# Patient Record
Sex: FEMALE | NOT HISPANIC OR LATINO | Employment: OTHER | URBAN - METROPOLITAN AREA
[De-identification: names, ages, dates, MRNs, and addresses within clinical notes are randomized per-mention and may not be internally consistent; named-entity substitution may affect disease eponyms.]

---

## 2017-01-03 ENCOUNTER — THERAPY VISIT (OUTPATIENT)
Dept: PHYSICAL THERAPY | Facility: CLINIC | Age: 57
End: 2017-01-03
Payer: COMMERCIAL

## 2017-01-03 DIAGNOSIS — S42.254A CLOSED NONDISPLACED FRACTURE OF GREATER TUBEROSITY OF RIGHT HUMERUS, INITIAL ENCOUNTER: ICD-10-CM

## 2017-01-03 DIAGNOSIS — M25.511 ACUTE PAIN OF RIGHT SHOULDER: Primary | ICD-10-CM

## 2017-01-03 PROCEDURE — 97110 THERAPEUTIC EXERCISES: CPT | Mod: GP

## 2017-01-03 PROCEDURE — 97112 NEUROMUSCULAR REEDUCATION: CPT | Mod: GP

## 2017-01-09 ENCOUNTER — THERAPY VISIT (OUTPATIENT)
Dept: PHYSICAL THERAPY | Facility: CLINIC | Age: 57
End: 2017-01-09
Payer: COMMERCIAL

## 2017-01-09 DIAGNOSIS — M25.511 ACUTE PAIN OF RIGHT SHOULDER: Primary | ICD-10-CM

## 2017-01-09 DIAGNOSIS — S42.254A CLOSED NONDISPLACED FRACTURE OF GREATER TUBEROSITY OF RIGHT HUMERUS, INITIAL ENCOUNTER: ICD-10-CM

## 2017-01-09 PROCEDURE — 97110 THERAPEUTIC EXERCISES: CPT | Mod: GP

## 2017-01-09 PROCEDURE — 97112 NEUROMUSCULAR REEDUCATION: CPT | Mod: GP

## 2017-01-09 NOTE — PROGRESS NOTES
Subjective:    HPI                    Objective:    System                   Shoulder Evaluation:  ROM:  AROM:  : ROM below is AAROM.  Flexion:  Right:  158    Abduction:  Right:  130    Internal Rotation:  Right:  30 (@90)  External Rotation:  Right:  88 (@90)                      Strength:    Flexion: Right: 5-/5     Pain:     Abduction:  Right: 4+/5     Pain:    Internal Rotation:  Right: 5-/5     Pain:  External Rotation:   Right:4+/5     Pain:                    Mobility Tests:  Mobility wnl shoulder: Movement pattern suggest substitution pattern with shoulder elevation but RTC strength and testing does not suggest tear;                                                    General     ROS    Assessment/Plan:      PROGRESS  REPORT          SUBJECTIVE  Subjective changes noted by patient:  R shoulder .       Changes in function:  Yes (See Goal flowsheet attached for changes in current functional level)  Adverse reaction to treatment or activity: None    OBJECTIVE  Changes noted in objective findings:  The objective findings below are from DOS 1/9/17.        ASSESSMENT/PLAN  Updated problem list and treatment plan: Diagnosis 1:  S/P greater tuberosity fracture due to GH dislocation   Decreased ROM/flexibility - manual therapy and therapeutic exercise  Decreased strength - therapeutic exercise and therapeutic activities  Impaired muscle performance - neuro re-education  Decreased function - therapeutic activities  STG/LTGs have been met or progress has been made towards goals:  Yes (See Goal flow sheet completed today.)  Assessment of Progress: The patient's condition has potential to improve.  The patient's progress has slowed.  Self Management Plans:  Patient has been instructed in a home treatment program.  Patient is independent in a home treatment program.  I have re-evaluated this patient and find that the nature, scope, duration and intensity of the therapy is appropriate for the medical condition of the  patient.  Promise continues to require the following intervention to meet STG and LTG's:  PT    Recommendations:  This patient would benefit from continued therapy.     Frequency:  2-4 X per month, once daily  Duration:  for 3 months        Please refer to the daily flowsheet for treatment today, total treatment time and time spent performing 1:1 timed codes.

## 2017-01-25 ENCOUNTER — THERAPY VISIT (OUTPATIENT)
Dept: PHYSICAL THERAPY | Facility: CLINIC | Age: 57
End: 2017-01-25
Payer: COMMERCIAL

## 2017-01-25 DIAGNOSIS — M25.511 ACUTE PAIN OF RIGHT SHOULDER: Primary | ICD-10-CM

## 2017-01-25 DIAGNOSIS — S42.254A CLOSED NONDISPLACED FRACTURE OF GREATER TUBEROSITY OF RIGHT HUMERUS, INITIAL ENCOUNTER: ICD-10-CM

## 2017-01-25 PROCEDURE — 97110 THERAPEUTIC EXERCISES: CPT | Mod: GP

## 2017-01-25 PROCEDURE — 97112 NEUROMUSCULAR REEDUCATION: CPT | Mod: GP

## 2017-02-03 ENCOUNTER — THERAPY VISIT (OUTPATIENT)
Dept: PHYSICAL THERAPY | Facility: CLINIC | Age: 57
End: 2017-02-03
Payer: COMMERCIAL

## 2017-02-03 DIAGNOSIS — S42.254A CLOSED NONDISPLACED FRACTURE OF GREATER TUBEROSITY OF RIGHT HUMERUS, INITIAL ENCOUNTER: ICD-10-CM

## 2017-02-03 DIAGNOSIS — M25.511 ACUTE PAIN OF RIGHT SHOULDER: Primary | ICD-10-CM

## 2017-02-03 PROCEDURE — 97112 NEUROMUSCULAR REEDUCATION: CPT | Mod: GP

## 2017-02-03 PROCEDURE — 97110 THERAPEUTIC EXERCISES: CPT | Mod: GP

## 2017-02-13 ENCOUNTER — THERAPY VISIT (OUTPATIENT)
Dept: PHYSICAL THERAPY | Facility: CLINIC | Age: 57
End: 2017-02-13
Payer: COMMERCIAL

## 2017-02-13 DIAGNOSIS — S42.254A CLOSED NONDISPLACED FRACTURE OF GREATER TUBEROSITY OF RIGHT HUMERUS, INITIAL ENCOUNTER: ICD-10-CM

## 2017-02-13 DIAGNOSIS — M25.511 ACUTE PAIN OF RIGHT SHOULDER: ICD-10-CM

## 2017-02-13 PROCEDURE — 97110 THERAPEUTIC EXERCISES: CPT | Mod: GP

## 2017-02-13 PROCEDURE — 97112 NEUROMUSCULAR REEDUCATION: CPT | Mod: GP

## 2017-02-22 ENCOUNTER — THERAPY VISIT (OUTPATIENT)
Dept: PHYSICAL THERAPY | Facility: CLINIC | Age: 57
End: 2017-02-22
Payer: COMMERCIAL

## 2017-02-22 DIAGNOSIS — M25.511 ACUTE PAIN OF RIGHT SHOULDER: ICD-10-CM

## 2017-02-22 DIAGNOSIS — S42.254A CLOSED NONDISPLACED FRACTURE OF GREATER TUBEROSITY OF RIGHT HUMERUS, INITIAL ENCOUNTER: ICD-10-CM

## 2017-02-22 PROCEDURE — 97112 NEUROMUSCULAR REEDUCATION: CPT | Mod: GP

## 2017-02-22 PROCEDURE — 97010 HOT OR COLD PACKS THERAPY: CPT | Mod: GP

## 2017-02-22 PROCEDURE — 97110 THERAPEUTIC EXERCISES: CPT | Mod: GP

## 2017-03-07 ENCOUNTER — THERAPY VISIT (OUTPATIENT)
Dept: PHYSICAL THERAPY | Facility: CLINIC | Age: 57
End: 2017-03-07
Payer: COMMERCIAL

## 2017-03-07 DIAGNOSIS — S42.254A CLOSED NONDISPLACED FRACTURE OF GREATER TUBEROSITY OF RIGHT HUMERUS, INITIAL ENCOUNTER: ICD-10-CM

## 2017-03-07 DIAGNOSIS — M25.511 ACUTE PAIN OF RIGHT SHOULDER: ICD-10-CM

## 2017-03-07 PROCEDURE — 97110 THERAPEUTIC EXERCISES: CPT | Mod: GP

## 2017-03-07 PROCEDURE — 97112 NEUROMUSCULAR REEDUCATION: CPT | Mod: GP

## 2017-03-22 ENCOUNTER — THERAPY VISIT (OUTPATIENT)
Dept: PHYSICAL THERAPY | Facility: CLINIC | Age: 57
End: 2017-03-22
Payer: COMMERCIAL

## 2017-03-22 DIAGNOSIS — S42.254A CLOSED NONDISPLACED FRACTURE OF GREATER TUBEROSITY OF RIGHT HUMERUS, INITIAL ENCOUNTER: ICD-10-CM

## 2017-03-22 DIAGNOSIS — M25.511 ACUTE PAIN OF RIGHT SHOULDER: ICD-10-CM

## 2017-03-22 PROCEDURE — 97112 NEUROMUSCULAR REEDUCATION: CPT | Mod: GP

## 2017-03-22 PROCEDURE — 97110 THERAPEUTIC EXERCISES: CPT | Mod: GP

## 2017-04-12 ENCOUNTER — THERAPY VISIT (OUTPATIENT)
Dept: PHYSICAL THERAPY | Facility: CLINIC | Age: 57
End: 2017-04-12
Payer: COMMERCIAL

## 2017-04-12 DIAGNOSIS — M25.511 ACUTE PAIN OF RIGHT SHOULDER: ICD-10-CM

## 2017-04-12 DIAGNOSIS — S42.254A CLOSED NONDISPLACED FRACTURE OF GREATER TUBEROSITY OF RIGHT HUMERUS, INITIAL ENCOUNTER: ICD-10-CM

## 2017-04-12 PROCEDURE — 97112 NEUROMUSCULAR REEDUCATION: CPT | Mod: GP

## 2017-04-12 PROCEDURE — 97010 HOT OR COLD PACKS THERAPY: CPT | Mod: GP

## 2017-04-12 PROCEDURE — 97110 THERAPEUTIC EXERCISES: CPT | Mod: GP

## 2017-05-03 ENCOUNTER — THERAPY VISIT (OUTPATIENT)
Dept: PHYSICAL THERAPY | Facility: CLINIC | Age: 57
End: 2017-05-03
Payer: COMMERCIAL

## 2017-05-03 DIAGNOSIS — M25.511 ACUTE PAIN OF RIGHT SHOULDER: ICD-10-CM

## 2017-05-03 DIAGNOSIS — S42.254A CLOSED NONDISPLACED FRACTURE OF GREATER TUBEROSITY OF RIGHT HUMERUS, INITIAL ENCOUNTER: ICD-10-CM

## 2017-05-03 PROCEDURE — 97112 NEUROMUSCULAR REEDUCATION: CPT | Mod: GP

## 2017-05-03 PROCEDURE — 97110 THERAPEUTIC EXERCISES: CPT | Mod: GP

## 2017-05-31 ENCOUNTER — THERAPY VISIT (OUTPATIENT)
Dept: PHYSICAL THERAPY | Facility: CLINIC | Age: 57
End: 2017-05-31
Payer: COMMERCIAL

## 2017-05-31 DIAGNOSIS — S42.254A CLOSED NONDISPLACED FRACTURE OF GREATER TUBEROSITY OF RIGHT HUMERUS, INITIAL ENCOUNTER: ICD-10-CM

## 2017-05-31 DIAGNOSIS — M25.511 ACUTE PAIN OF RIGHT SHOULDER: ICD-10-CM

## 2017-05-31 PROCEDURE — 97112 NEUROMUSCULAR REEDUCATION: CPT | Mod: GP

## 2017-05-31 PROCEDURE — 97110 THERAPEUTIC EXERCISES: CPT | Mod: GP

## 2017-05-31 NOTE — MR AVS SNAPSHOT
After Visit Summary   5/31/2017    Promise Nunez    MRN: [id number]           Patient Information     Date Of Birth          1960        Visit Information        Provider Department      5/31/2017 12:50 PM Femi Ferrara PT [place] [facility]        Today's Diagnoses     Acute pain of right shoulder        Closed nondisplaced fracture of greater tuberosity of right humerus, initial encounter           Follow-ups after your visit        Who to contact     If you have questions or need follow up information about today's clinic visit or your schedule please contact [facility] directly at [phone].  Normal or non-critical lab and imaging results will be communicated to you by [employer], letter or phone within 4 business days after the clinic has received the results. If you do not hear from us within 7 days, please contact the clinic through [employer] or phone. If you have a critical or abnormal lab result, we will notify you by phone as soon as possible.  Submit refill requests through [employer] or call your pharmacy and they will forward the refill request to us. Please allow 3 business days for your refill to be completed.          Additional Information About Your Visit        MyChart Information     [employer] gives you secure access to your electronic health record. If you see a primary care provider, you can also send messages to your care team and make appointments. If you have questions, please call your primary care clinic.  If you do not have a primary care provider, please call [phone] and they will assist you.        Care EveryWhere ID     This is your Care EveryWhere ID. This could be used by other organizations to access your [place] medical records  [id number]         Blood Pressure from Last 3 Encounters:   06/10/16 110/82   06/08/16 140/82   05/05/16 117/74    Weight from Last 3  Encounters:   06/10/16 57.2 kg (126 lb)   06/08/16 57.2 kg (126 lb)   05/05/16 60.1 kg (132 lb 9.6 oz)              We Performed the Following     NEUROMUSCULAR RE-EDUCATION     THERAPEUTIC EXERCISES        Primary Care Provider Office Phone # Fax #    Thania Mahoney [phone]       [facility] [address] [facility] [address]        Thank you!     Thank you for [facility]  for your care. Our goal is always to provide you with excellent care. Hearing back from our patients is one way we can continue to improve our services. Please take a few minutes to complete the written survey that you may receive in the mail after your visit with us. Thank you!             Your Updated Medication List - Protect others around you: Learn how to safely use, store and throw away your medicines at www.disposemymeds.org.          This list is accurate as of: 5/31/17  2:22 PM.  Always use your most recent med list.                   Brand Name Dispense Instructions for use    HYDROcodone-acetaminophen 5-325 MG per tablet    NORCO    15 tablet    Take 1-2 tablets by mouth every 4 hours as needed for moderate to severe pain       oxyCODONE 5 MG IR tablet    ROXICODONE    20 tablet    Take 1-2 tablets (5-10 mg) by mouth every 4 hours as needed for moderate to severe pain       VALACYCLOVIR HCL PO

## 2017-05-31 NOTE — PROGRESS NOTES
Subjective:    HPI                    Objective:    System    Physical Exam    General     ROS    Assessment/Plan:      DISCHARGE REPORT    Progress reporting period is from July 2016 to May 2016.       SUBJECTIVE  Subjective changes noted by patient:  .  Subjective: CC: lifting overhead     Current pain level is 2/10  .     Previous pain level was  9/10  .   Changes in function:  Yes (See Goal flowsheet attached for changes in current functional level)  Adverse reaction to treatment or activity: None    OBJECTIVE  Changes noted in objective findings:  Yes, Full ROM  Objective: MMT:  (in pounds):  Flex=5.8; Scap= 7.2; ER (@0) = 14.7; ER (@90) = 12.4     ASSESSMENT/PLAN    STG/LTGs have been met or progress has been made towards goals:  Yes (See Goal flow sheet completed today.)  Assessment of Progress: The patient's condition is improving.  The patient's condition has potential to improve.  Self Management Plans:  Patient is independent in a home treatment program.  I have re-evaluated this patient and find that the nature, scope, duration and intensity of the therapy is appropriate for the medical condition of the patient.  Promise continues to require the following intervention to meet STG and LTG's:  PT intervention is no longer required to meet STG/LTG.    Recommendations:  This patient is ready to be discharged from therapy and continue their home treatment program.    Please refer to the daily flowsheet for treatment today, total treatment time and time spent performing 1:1 timed codes.

## 2018-12-25 ENCOUNTER — APPOINTMENT (OUTPATIENT)
Dept: ULTRASOUND IMAGING | Facility: CLINIC | Age: 58
DRG: 390 | End: 2018-12-25
Attending: EMERGENCY MEDICINE
Payer: COMMERCIAL

## 2018-12-25 ENCOUNTER — HOSPITAL ENCOUNTER (INPATIENT)
Facility: CLINIC | Age: 58
LOS: 3 days | Discharge: HOME | DRG: 390 | End: 2018-12-28
Attending: EMERGENCY MEDICINE | Admitting: INTERNAL MEDICINE
Payer: COMMERCIAL

## 2018-12-25 ENCOUNTER — APPOINTMENT (OUTPATIENT)
Dept: CT IMAGING | Facility: CLINIC | Age: 58
DRG: 390 | End: 2018-12-25
Attending: EMERGENCY MEDICINE
Payer: COMMERCIAL

## 2018-12-25 DIAGNOSIS — K80.20 CALCULUS OF GALLBLADDER WITHOUT CHOLECYSTITIS WITHOUT OBSTRUCTION: ICD-10-CM

## 2018-12-25 DIAGNOSIS — K56.609 SBO (SMALL BOWEL OBSTRUCTION) (H): ICD-10-CM

## 2018-12-25 LAB
ALBUMIN SERPL-MCNC: 4.3 G/DL (ref 3.4–5)
ALP SERPL-CCNC: 102 U/L (ref 40–150)
ALT SERPL W P-5'-P-CCNC: 53 U/L (ref 0–50)
ANION GAP SERPL CALCULATED.3IONS-SCNC: 8 MMOL/L (ref 3–14)
AST SERPL W P-5'-P-CCNC: 47 U/L (ref 0–45)
BASOPHILS # BLD AUTO: 0.1 10E9/L (ref 0–0.2)
BASOPHILS NFR BLD AUTO: 1 %
BILIRUB DIRECT SERPL-MCNC: 0.2 MG/DL (ref 0–0.2)
BILIRUB SERPL-MCNC: 0.8 MG/DL (ref 0.2–1.3)
BUN SERPL-MCNC: 13 MG/DL (ref 7–30)
BURR CELLS BLD QL SMEAR: SLIGHT
CALCIUM SERPL-MCNC: 9.1 MG/DL (ref 8.5–10.1)
CHLORIDE SERPL-SCNC: 101 MMOL/L (ref 94–109)
CO2 SERPL-SCNC: 26 MMOL/L (ref 20–32)
CREAT SERPL-MCNC: 0.54 MG/DL (ref 0.52–1.04)
DIFFERENTIAL METHOD BLD: NORMAL
EOSINOPHIL # BLD AUTO: 0 10E9/L (ref 0–0.7)
EOSINOPHIL NFR BLD AUTO: 0 %
ERYTHROCYTE [DISTWIDTH] IN BLOOD BY AUTOMATED COUNT: 13.6 % (ref 10–15)
GFR SERPL CREATININE-BSD FRML MDRD: >90 ML/MIN/{1.73_M2}
GLUCOSE SERPL-MCNC: 145 MG/DL (ref 70–99)
HCT VFR BLD AUTO: 43.9 % (ref 35–47)
HGB BLD-MCNC: 15.3 G/DL (ref 11.7–15.7)
LIPASE SERPL-CCNC: 161 U/L (ref 73–393)
LYMPHOCYTES # BLD AUTO: 1.6 10E9/L (ref 0.8–5.3)
LYMPHOCYTES NFR BLD AUTO: 16 %
MCH RBC QN AUTO: 30.5 PG (ref 26.5–33)
MCHC RBC AUTO-ENTMCNC: 34.9 G/DL (ref 31.5–36.5)
MCV RBC AUTO: 88 FL (ref 78–100)
MONOCYTES # BLD AUTO: 0.4 10E9/L (ref 0–1.3)
MONOCYTES NFR BLD AUTO: 4 %
NEUTROPHILS # BLD AUTO: 7.7 10E9/L (ref 1.6–8.3)
NEUTROPHILS NFR BLD AUTO: 79 %
PLATELET # BLD AUTO: 208 10E9/L (ref 150–450)
PLATELET # BLD EST: NORMAL 10*3/UL
POTASSIUM SERPL-SCNC: 4.1 MMOL/L (ref 3.4–5.3)
PROT SERPL-MCNC: 8.4 G/DL (ref 6.8–8.8)
RBC # BLD AUTO: 5.02 10E12/L (ref 3.8–5.2)
SODIUM SERPL-SCNC: 135 MMOL/L (ref 133–144)
WBC # BLD AUTO: 9.8 10E9/L (ref 4–11)

## 2018-12-25 PROCEDURE — 99223 1ST HOSP IP/OBS HIGH 75: CPT | Mod: AI | Performed by: INTERNAL MEDICINE

## 2018-12-25 PROCEDURE — 25000125 ZZHC RX 250

## 2018-12-25 PROCEDURE — 80048 BASIC METABOLIC PNL TOTAL CA: CPT | Performed by: EMERGENCY MEDICINE

## 2018-12-25 PROCEDURE — 74177 CT ABD & PELVIS W/CONTRAST: CPT

## 2018-12-25 PROCEDURE — 99285 EMERGENCY DEPT VISIT HI MDM: CPT | Mod: 25

## 2018-12-25 PROCEDURE — 25000125 ZZHC RX 250: Performed by: EMERGENCY MEDICINE

## 2018-12-25 PROCEDURE — 85025 COMPLETE CBC W/AUTO DIFF WBC: CPT | Performed by: EMERGENCY MEDICINE

## 2018-12-25 PROCEDURE — 76705 ECHO EXAM OF ABDOMEN: CPT

## 2018-12-25 PROCEDURE — 25000128 H RX IP 250 OP 636: Performed by: EMERGENCY MEDICINE

## 2018-12-25 PROCEDURE — 25000132 ZZH RX MED GY IP 250 OP 250 PS 637: Performed by: EMERGENCY MEDICINE

## 2018-12-25 PROCEDURE — 80076 HEPATIC FUNCTION PANEL: CPT | Performed by: EMERGENCY MEDICINE

## 2018-12-25 PROCEDURE — 25000132 ZZH RX MED GY IP 250 OP 250 PS 637: Performed by: PHYSICIAN ASSISTANT

## 2018-12-25 PROCEDURE — 25000128 H RX IP 250 OP 636: Performed by: PHYSICIAN ASSISTANT

## 2018-12-25 PROCEDURE — 12000000 ZZH R&B MED SURG/OB

## 2018-12-25 PROCEDURE — 96374 THER/PROPH/DIAG INJ IV PUSH: CPT | Mod: 59

## 2018-12-25 PROCEDURE — 96375 TX/PRO/DX INJ NEW DRUG ADDON: CPT

## 2018-12-25 PROCEDURE — 83690 ASSAY OF LIPASE: CPT | Performed by: EMERGENCY MEDICINE

## 2018-12-25 PROCEDURE — 99221 1ST HOSP IP/OBS SF/LOW 40: CPT | Performed by: SURGERY

## 2018-12-25 PROCEDURE — 96361 HYDRATE IV INFUSION ADD-ON: CPT

## 2018-12-25 PROCEDURE — 25000125 ZZHC RX 250: Performed by: PHYSICIAN ASSISTANT

## 2018-12-25 RX ORDER — LIDOCAINE 40 MG/G
CREAM TOPICAL
Status: DISCONTINUED | OUTPATIENT
Start: 2018-12-25 | End: 2018-12-28 | Stop reason: HOSPADM

## 2018-12-25 RX ORDER — ONDANSETRON 2 MG/ML
4 INJECTION INTRAMUSCULAR; INTRAVENOUS EVERY 6 HOURS PRN
Status: DISCONTINUED | OUTPATIENT
Start: 2018-12-25 | End: 2018-12-28 | Stop reason: HOSPADM

## 2018-12-25 RX ORDER — VALACYCLOVIR HYDROCHLORIDE 500 MG/1
500 TABLET, FILM COATED ORAL DAILY PRN
COMMUNITY
End: 2022-05-16

## 2018-12-25 RX ORDER — IOPAMIDOL 755 MG/ML
60 INJECTION, SOLUTION INTRAVASCULAR ONCE
Status: COMPLETED | OUTPATIENT
Start: 2018-12-25 | End: 2018-12-25

## 2018-12-25 RX ORDER — PROCHLORPERAZINE 25 MG
25 SUPPOSITORY, RECTAL RECTAL EVERY 12 HOURS PRN
Status: DISCONTINUED | OUTPATIENT
Start: 2018-12-25 | End: 2018-12-28 | Stop reason: HOSPADM

## 2018-12-25 RX ORDER — BISACODYL 10 MG
10 SUPPOSITORY, RECTAL RECTAL DAILY PRN
Status: DISCONTINUED | OUTPATIENT
Start: 2018-12-25 | End: 2018-12-28 | Stop reason: HOSPADM

## 2018-12-25 RX ORDER — PROCHLORPERAZINE MALEATE 10 MG
10 TABLET ORAL EVERY 6 HOURS PRN
Status: DISCONTINUED | OUTPATIENT
Start: 2018-12-25 | End: 2018-12-28 | Stop reason: HOSPADM

## 2018-12-25 RX ORDER — AMOXICILLIN 250 MG
1 CAPSULE ORAL 2 TIMES DAILY PRN
Status: DISCONTINUED | OUTPATIENT
Start: 2018-12-25 | End: 2018-12-28 | Stop reason: HOSPADM

## 2018-12-25 RX ORDER — SODIUM CHLORIDE 9 MG/ML
INJECTION, SOLUTION INTRAVENOUS CONTINUOUS
Status: DISCONTINUED | OUTPATIENT
Start: 2018-12-25 | End: 2018-12-26

## 2018-12-25 RX ORDER — AMOXICILLIN 250 MG
2 CAPSULE ORAL 2 TIMES DAILY PRN
Status: DISCONTINUED | OUTPATIENT
Start: 2018-12-25 | End: 2018-12-28 | Stop reason: HOSPADM

## 2018-12-25 RX ORDER — ACETAMINOPHEN 325 MG/1
650 TABLET ORAL EVERY 4 HOURS PRN
Status: DISCONTINUED | OUTPATIENT
Start: 2018-12-25 | End: 2018-12-28 | Stop reason: HOSPADM

## 2018-12-25 RX ORDER — ONDANSETRON 4 MG/1
4 TABLET, ORALLY DISINTEGRATING ORAL EVERY 6 HOURS PRN
Status: DISCONTINUED | OUTPATIENT
Start: 2018-12-25 | End: 2018-12-28 | Stop reason: HOSPADM

## 2018-12-25 RX ORDER — VALACYCLOVIR HYDROCHLORIDE 500 MG/1
500 TABLET, FILM COATED ORAL DAILY
Status: DISCONTINUED | OUTPATIENT
Start: 2018-12-25 | End: 2018-12-28 | Stop reason: HOSPADM

## 2018-12-25 RX ORDER — HYDROMORPHONE HYDROCHLORIDE 1 MG/ML
0.5 INJECTION, SOLUTION INTRAMUSCULAR; INTRAVENOUS; SUBCUTANEOUS
Status: DISCONTINUED | OUTPATIENT
Start: 2018-12-25 | End: 2018-12-25

## 2018-12-25 RX ORDER — MORPHINE SULFATE 4 MG/ML
4 INJECTION, SOLUTION INTRAMUSCULAR; INTRAVENOUS
Status: COMPLETED | OUTPATIENT
Start: 2018-12-25 | End: 2018-12-25

## 2018-12-25 RX ORDER — NALOXONE HYDROCHLORIDE 0.4 MG/ML
.1-.4 INJECTION, SOLUTION INTRAMUSCULAR; INTRAVENOUS; SUBCUTANEOUS
Status: DISCONTINUED | OUTPATIENT
Start: 2018-12-25 | End: 2018-12-28 | Stop reason: HOSPADM

## 2018-12-25 RX ORDER — HYDROMORPHONE HYDROCHLORIDE 1 MG/ML
.3-.5 INJECTION, SOLUTION INTRAMUSCULAR; INTRAVENOUS; SUBCUTANEOUS
Status: DISCONTINUED | OUTPATIENT
Start: 2018-12-25 | End: 2018-12-28 | Stop reason: HOSPADM

## 2018-12-25 RX ORDER — HYDROCODONE BITARTRATE AND ACETAMINOPHEN 5; 325 MG/1; MG/1
1-2 TABLET ORAL EVERY 4 HOURS PRN
Status: DISCONTINUED | OUTPATIENT
Start: 2018-12-25 | End: 2018-12-28 | Stop reason: HOSPADM

## 2018-12-25 RX ORDER — IBUPROFEN 200 MG
200 TABLET ORAL EVERY 4 HOURS PRN
COMMUNITY
End: 2020-06-25

## 2018-12-25 RX ORDER — POLYETHYLENE GLYCOL 3350 17 G/17G
17 POWDER, FOR SOLUTION ORAL DAILY PRN
Status: DISCONTINUED | OUTPATIENT
Start: 2018-12-25 | End: 2018-12-28 | Stop reason: HOSPADM

## 2018-12-25 RX ORDER — ONDANSETRON 2 MG/ML
4 INJECTION INTRAMUSCULAR; INTRAVENOUS EVERY 30 MIN PRN
Status: DISCONTINUED | OUTPATIENT
Start: 2018-12-25 | End: 2018-12-25

## 2018-12-25 RX ADMIN — IOPAMIDOL 60 ML: 755 INJECTION, SOLUTION INTRAVENOUS at 08:07

## 2018-12-25 RX ADMIN — Medication 0.5 MG: at 09:18

## 2018-12-25 RX ADMIN — HYDROMORPHONE HYDROCHLORIDE 0.5 MG: 1 INJECTION, SOLUTION INTRAMUSCULAR; INTRAVENOUS; SUBCUTANEOUS at 15:51

## 2018-12-25 RX ADMIN — LIDOCAINE HYDROCHLORIDE 30 ML: 20 SOLUTION ORAL; TOPICAL at 07:27

## 2018-12-25 RX ADMIN — TOPICAL ANESTHETIC 2.5 ML: 200 SPRAY DENTAL; PERIODONTAL at 09:53

## 2018-12-25 RX ADMIN — SODIUM CHLORIDE 60 ML: 9 INJECTION, SOLUTION INTRAVENOUS at 08:06

## 2018-12-25 RX ADMIN — ONDANSETRON 4 MG: 2 INJECTION INTRAMUSCULAR; INTRAVENOUS at 06:37

## 2018-12-25 RX ADMIN — SODIUM CHLORIDE: 9 INJECTION, SOLUTION INTRAVENOUS at 11:27

## 2018-12-25 RX ADMIN — LIDOCAINE HYDROCHLORIDE 10 ML: 20 JELLY TOPICAL at 09:54

## 2018-12-25 RX ADMIN — SODIUM CHLORIDE 1000 ML: 9 INJECTION, SOLUTION INTRAVENOUS at 07:25

## 2018-12-25 RX ADMIN — VALACYCLOVIR HYDROCHLORIDE 500 MG: 500 TABLET, FILM COATED ORAL at 12:15

## 2018-12-25 RX ADMIN — HYDROMORPHONE HYDROCHLORIDE 0.3 MG: 1 INJECTION, SOLUTION INTRAMUSCULAR; INTRAVENOUS; SUBCUTANEOUS at 11:26

## 2018-12-25 RX ADMIN — FAMOTIDINE 20 MG: 10 INJECTION, SOLUTION INTRAVENOUS at 11:26

## 2018-12-25 RX ADMIN — SODIUM CHLORIDE: 9 INJECTION, SOLUTION INTRAVENOUS at 20:57

## 2018-12-25 RX ADMIN — MORPHINE SULFATE 4 MG: 4 INJECTION INTRAVENOUS at 06:37

## 2018-12-25 ASSESSMENT — ACTIVITIES OF DAILY LIVING (ADL)
ADLS_ACUITY_SCORE: 15
ADLS_ACUITY_SCORE: 13
ADLS_ACUITY_SCORE: 11

## 2018-12-25 ASSESSMENT — ENCOUNTER SYMPTOMS
FEVER: 0
ABDOMINAL PAIN: 1
NAUSEA: 1
VOMITING: 1

## 2018-12-25 ASSESSMENT — MIFFLIN-ST. JEOR: SCORE: 1056.7

## 2018-12-25 NOTE — CONSULTS
[facility]  General Surgery Consultation         Bert Yoder Juhi    Promise Nunez MRN# [id number]   YOB: 1960 Age: 58 year old      Date of Admission:  12/25/2018  Date of Consult: 12/25/2018         Assessment and Plan:   Patient is a 58 year old female with possible small bowel obstruction    PLAN:  I have personally reviewed her imaging studies which show a possible small bowel obstruction.  The patient has had a bowel movement upon presentation but continues to have crampy abdominal pain.  She does not have any significant tenderness on examination or other signs that would warrant exploration at this time.  I would recommend performing a contrast challenge through her NG tube.  Please give a 90 mL of Gastroview and keep NG tube clamped unless severe abdominal distention or nausea.  I will get a follow-up x-ray in the morning.  If she develops increased abdominal pain or nausea the NG can be placed to low intermittent suction.  I advised the patient if she does not improve or worsens that she could require operative exploration.  Patient agreed.          Chief Complaint:     Chief Complaint   Patient presents with     Abdominal Pain     generalized abdominal pain since last evening with n/v          History of Present Illness:   Patient is a 58 year old female who I was asked to see by Dr. Clinton  for evaluation of  small bowel obstruction.The patient describes having symptoms for the last 1 days. The pain is mainly located in the entire abdomen area. The patient rates the pain as crampy in nature and is severe when cramps are present and dissipates when they pass.  She had some nausea and vomiting at home but the cramping brought her to the hospital.  She also had 2 bowel movements this morning but did not notice a significant change in her symptoms.  She has never had any abdominal operations or symptoms like this in the past.  She denies any exacerbating or alleviating  "factors at this time.  She feels sleepy due to lack of sleep and recent pain med administration.  An NG tube was placed with minimal output.  She has no other complaints at this time. Patient denies fevers, chills, jaundice, changes in stool or urine, headache, SOB, chest pain.          Physical Exam:   Blood pressure 136/76, pulse 94, temperature 97.4  F (36.3  C), temperature source Oral, resp. rate 20, height 1.549 m (5' 1\"), weight 54.4 kg (120 lb), SpO2 95 %, not currently breastfeeding.  120 lbs 0 oz  General: Generally appears well.  Psych: Alert and Oriented.  Normal affect  Neurological: grossly intact  Eyes: Sclera clear  Respiratory:  Lungs with good air excursion  Cardiovascular:  normal peripheral pulses  GI: Abdomen Soft minimal tenderness. Mild distention. No rebound or guarding.   Lymphatic/Hematologic/Immune:  No femoral or cervical lymphadenopathy.  Integumentary:  No rashes       Past Medical History:     Past Medical History:   Diagnosis Date     Abnormal echocardiogram     She had CT coronary angiography which was normal     GERD (gastroesophageal reflux disease)           Past Surgical History:     Past Surgical History:   Procedure Laterality Date     None            Current Medications:           famotidine  20 mg Intravenous Q12H     sodium chloride (PF)  3 mL Intracatheter Q8H     valACYclovir  500 mg Oral Daily     acetaminophen, bisacodyl, HYDROcodone-acetaminophen, HYDROmorphone, lidocaine 4%, lidocaine (buffered or not buffered), melatonin, naloxone, ondansetron **OR** ondansetron, polyethylene glycol, prochlorperazine **OR** prochlorperazine **OR** prochlorperazine, senna-docusate **OR** senna-docusate, sodium chloride (PF)       Home Medications:     Prior to Admission medications    Medication Sig Last Dose Taking? Auth Provider   ibuprofen (ADVIL/MOTRIN) 200 MG tablet Take 200 mg by mouth every 4 hours as needed for mild pain unknown Yes Unknown, Entered By History   valACYclovir " (VALTREX) 500 MG tablet Take 500 mg by mouth daily as needed  12/24/2018 at Unknown time Yes Unknown, Entered By History          Allergies:     Allergies   Allergen Reactions     No Known Allergies           Family History:     Family History   Problem Relation Age of Onset     Hypertension Mother      Diabetes Mother      Diabetes Father          Social History:   Promise Nunez  reports that  has never smoked. she has never used smokeless tobacco. She reports that she does not drink alcohol or use drugs.        Review of Systems:   The 10 point Review of Systems is negative other than noted in the HPI.       Labs/Imaging   All new lab and imaging data was reviewed.     Bert Reynaga M.D.  [facility]

## 2018-12-25 NOTE — PHARMACY-ADMISSION MEDICATION HISTORY
Admission medication history interview status for the 12/25/2018  admission is complete. See EPIC admission navigator for prior to admission medications     Medication history source reliability:Good    Actions taken by pharmacist (provider contacted, etc): reinterviewed patient     Additional medication history information not noted on PTA med list :states she does not take valtrex daily, takes intermittently    Medication reconciliation/reorder completed by provider prior to medication history? No    Time spent in this activity: 20 min    Prior to Admission medications    Medication Sig Last Dose Taking? Auth Provider   ibuprofen (ADVIL/MOTRIN) 200 MG tablet Take 200 mg by mouth every 4 hours as needed for mild pain unknown Yes Unknown, Entered By History   valACYclovir (VALTREX) 500 MG tablet Take 500 mg by mouth daily as needed  12/24/2018 at Unknown time Yes Unknown, Entered By History         Admission medication history interview status for the 12/25/2018  admission is complete. See EPIC admission navigator for prior to admission medications     Medication history source reliability:Moderate    Actions taken by pharmacist (provider contacted, etc): spoke with , called [facility] to confirm Valtrex dose     Additional medication history information not noted on PTA med list :None    Medication reconciliation/reorder completed by provider prior to medication history? No    Time spent in this activity: 15 min    Prior to Admission medications    Medication Sig Last Dose Taking? Auth Provider   valACYclovir (VALTREX) 500 MG tablet Take 500 mg by mouth daily 12/24/2018 at Unknown time Yes Unknown, Entered By History

## 2018-12-25 NOTE — PROGRESS NOTES
RECEIVING UNIT ED HANDOFF REVIEW    ED Nurse Handoff Report was reviewed by: Arianne Mcgill on December 25, 2018 at 9:25 AM

## 2018-12-25 NOTE — ED PROVIDER NOTES
"  History     Chief Complaint:  Abdominal Pain     HPI   Promise Nunez is a 58 year old female presenting with abdominal pain, nausea and vomiting.  Around 5 PM last night she began experiencing increasing diffuse generalized abdominal pain.  Pain seems to have some intermittent waves and is described as contractions.  There is no exacerbating or alleviating factors.  She has had some nausea and vomiting.  She has had 2 bowel movements since this.  She denies fevers or urinary symptoms.  She denies having symptoms previously similar to this.    Allergies:  The patient has no known drug allergies.     Medications:    The patient is currently on no regular medications.     Past Medical History:    Kidney stone  GERD  Abnormal echocardiogram    Past Surgical History:    The patient does not have any pertinent past surgical history.    Family History:    HTN  Diabetes    Social History:  Negative for tobacco use.  Negative for alcohol use.  Marital Status:  Single     Review of Systems   Constitutional: Negative for fever.   Gastrointestinal: Positive for abdominal pain, nausea and vomiting.   Genitourinary: Negative.    All other systems reviewed and are negative.    Physical Exam     Patient Vitals for the past 24 hrs:   BP Temp Temp src Pulse Heart Rate Resp SpO2 Height Weight   12/25/18 0558 -- 98.1  F (36.7  C) Oral -- -- -- -- -- --   12/25/18 0552 132/71 -- -- 87 87 20 100 % 1.549 m (5' 1\") 54.4 kg (120 lb)      Physical Exam  General/Appearance: appears stated age, well-groomed, appears very uncomfortable  Eyes: EOMI, no scleral injection, no icterus  ENT: MMM  Neck: supple, nl ROM, no stiffness  Cardiovascular: RRR, nl S1S2, no m/r/g, 2+ pulses in all 4 extremities, cap refill <2sec  Respiratory: CTAB, good air movement throughout, no wheezes/rhonchi/rales, no increased WOB, no retractions  Back: no lesions  GI: abd soft, non-distended, no specific area with focal ttp,  no HSM, no rebound, no guarding, nl " BS  MSK: CUBA, good tone, no bony abnormality  Skin: warm and well-perfused, no rash, no edema, no ecchymosis, nl turgor  Neuro: GCS 15, alert and oriented, no gross focal neuro deficits  Psych: interacts appropriately  Heme: no petechia, no purpura, no active bleeding    Emergency Department Course     Imaging:  Radiology findings were communicated with the patient who voiced understanding of the findings.    CT Abdomen Pelvis w Contrast  1. Small bowel obstruction. The site of obstruction appears to be in  the left hemipelvis within the mid small bowel. Small amount of free  peritoneal fluid is noted in the pelvis. No free peritoneal air.  2. Suspected cholelithiasis.  Reading per radiology     Laboratory:  Laboratory findings were communicated with the patient who voiced understanding of the findings.    Lipase: 161  Hepatic panel: ALT 53, AST 47  CBC: WBC 9.8, HGB 15.3,   BMP: Glucose 145, o/w WNL (Creatinine 0.54)    Interventions:  0637 Zofran 4 mg IV  0637 Morphine 4 mg IV  0725 NS, 1 L, IV   0727 Dilaudid 0.5 mg IV  0727 GI Cocktail (Maalox/Mylanta and viscous Lidocaine), 30 mL suspension, PO      Emergency Department Course:    Nursing notes and vitals reviewed.    I performed an exam of the patient as documented above.     0639 IV was inserted and blood was drawn for laboratory testing, results above.     0757 The patient was sent for a CT while in the emergency department, results above.      0833 Patient was rechecked and updated. I personally reviewed the imaging and lab results with the patient and answered all related questions prior to admission.    0840 I spoke with Dr. KEYANA Gatica of the Hospitalist service regarding patient's presentation, findings, and plan of care.     Impression & Plan      Medical Decision Making:  Promise Nunez is a 58 year old female who presents to the emergency department today for evaluation of abdominal pain, nausea and vomiting.  LFTs show very minimal bump in AST and  LFT.  CT scan, highest concern for bowel obstruction, did in fact show an SBO with the mid small bowel transition point.  CT also shows/suggests gallstones.  She does have minimal bump in her LFTs will add an ultrasound done.  Per hospitalist's request we will put an NG tube here in the ED.  She will be admitted to the hospital.    Diagnosis:    ICD-10-CM    1. SBO (small bowel obstruction) (H) K56.609    2. Calculus of gallbladder without cholecystitis without obstruction K80.20      Disposition:   Admission    Scribe Disclosure:  Tr SERRANO, am serving as a scribe at 7:56 AM on 12/25/2018 to document services personally performed by Dennise Luevano MD based on my observations and the provider's statements to me.     EMERGENCY DEPARTMENT       Dennise Luevano MD  12/25/18 1022

## 2018-12-25 NOTE — H&P
Admitted:     12/25/2018      PRIMARY CARE PHYSICIAN:  None.      CHIEF COMPLAINT:  Abdominal pain with nausea and vomiting.      HISTORY OF PRESENT ILLNESS:  Promise Nunez is a 58-year-old female with a past medical history significant for GERD, HSV and nephrolithiasis who presented to [facility] Emergency Department due to abdominal pain with nausea and vomiting.      The patient was evaluated by Dr. Luevano in the Emergency Department.  Evaluation has included a comprehensive metabolic panel revealing a creatinine of 0.54 and GFR greater than 90.  ALT of 53, AST of 47 and glucose of 145, otherwise within normal limits.  Lipase level was within normal limits at 161.  CBC with differential was unremarkable with a white count of 9.8, hemoglobin of 15.3 and platelet count of 208.  A CT abdomen and pelvis with contrast was performed that showed a small-bowel obstruction with the site of the obstruction appearing to be in the left hemipelvis within the mid-small bowel.  A small amount of free peritoneal fluid is noted in the pelvis.  No free peritoneal air is noted.  Suspected cholelithiasis.  The patient underwent a limited right upper quadrant abdominal ultrasound, which revealed cholelithiasis without evidence of acute cholecystitis and a 2.4 cm right lower pole renal cyst.  The patient has received a liter of IV fluids, a GI cocktail, 4 mg of IV morphine, 4 mg of IV Zofran, and was then admitted to [facility] under the Hospitalist Service for continued evaluation and management.      I evaluated the patient in the Emergency Department where she was lying in bed with her  at the bedside.  The patient indicated that she developed abdominal pain yesterday evening around 5:00.  The pain has been intermittent, coming in waves, but is increasingly intensified.  It appears she has gotten no alleviation with change in position, and nothing seems to be exacerbating the pain either.  She  attempted to use ibuprofen and omeprazole without any relief.  She indicated that she has never had anything like this before and had had 2 small bowel movements since development of abdominal pain.      The patient also indicates that she occasionally has headaches.  She uses glasses.  Had upper a respiratory-like cold infection month ago and has ongoing knee pain.      PAST MEDICAL HISTORY:   1.  GERD.   2.  Remote history of nephrolithiasis.   3.  HSV.       PAST SURGICAL HISTORY:  None.      AFTAY-RV-GZNZE MEDICATIONS:  Patient initially reported none except PRN IBU and antacids.  Pharmacy reconciliation:   Medications Prior to Admission   Medication Sig Dispense Refill Last Dose     ibuprofen (ADVIL/MOTRIN) 200 MG tablet Take 200 mg by mouth every 4 hours as needed for mild pain   unknown     valACYclovir (VALTREX) 500 MG tablet Take 500 mg by mouth daily as needed    12/24/2018 at Unknown time       ALLERGIES:  NO KNOWN DRUG ALLERGIES.        SOCIAL HISTORY:  The patient currently resides in a house in [place] with her .  She denies any active or historic tobacco use, alcohol use or street drug use.  She does not currently utilize a cane or walker.      FAMILY MEDICAL HISTORY:  Mother is a diabetic and has high blood pressure.      REVIEW OF SYSTEMS:  A 10-point review of systems was performed.  All pertinent positives are listed in the history of present illness, otherwise is negative.      PHYSICAL EXAMINATION:   VITAL SIGNS:  Temperature is 98.1 degrees Fahrenheit with a blood pressure of 132/71, heart rate of 87 beats per minute, respiratory rate of 20, O2 saturation of 100 percent on room air.  Patient is rating her pain at 10/10.   GENERAL:  The patient is awake, alert and cooperative.  She is grimacing out in pain and changing positions in attempts to get into a more comfortable position.  Alert and oriented x3.   HEENT:  Normocephalic, atraumatic.  Moist mucous membranes present.  No exudates  noted in the posterior pharynx.  Uvula is midline.  Eyes, pupils are equal, round, and reactive to light.  Extraocular movements are intact.  Normal sclerae.   NECK:  Supple, normal range of motion, no tracheal deviation, no cervical lymphadenopathy present.   CARDIOVASCULAR:  Regular rate and rhythm.  No rubs, murmurs or gallops appreciated.   PULMONARY:  Lungs are clear to auscultation bilaterally.  No wheezes, rhonchi or rales appreciated.   GASTROINTESTINAL:  Minimal bowel sounds present.  Soft, nondistended, tender to palpation in the lower quadrant.   NEUROLOGIC:  Cranial nerves II to XII are grossly intact.  Patient demonstrates equal sensation, coordination and strength in the upper and lower extremities bilaterally.   EXTREMITIES:  No lower extremity edema noted.  Calves are non-tender to palpation.       ASSESSMENT AND PLAN:  Promise Nunez is a 58-year-old female with a past medical history significant for gastroesophageal reflux disease (GERD), HSV  and history of nephrolithiasis who is being admitted to [facility] due to a small-bowel obstruction.   1.  Small-bowel obstruction:  This was confirmed on CT abdomen and pelvis with contrast.  The patient will be made n.p.o.  Will start IV fluids with normal saline at 100 mL per hour.  P.r.n. IV Dilaudid will be made available every 2 hours at 0.2 mg and will increase if pain persists.  Nasogastric (NG) to be placed in the Emergency Department and then connected to low intermittent suction.  With fairly benign abdominal exam and no history of abdominal surgeries will consult general surgery for further assessment.     2.  Incidental cholelithiasis:  On abdomen and pelvis CT with contrast, there was a suspicion for cholelithiasis.  Patient underwent an abdominal right upper quadrant limited ultrasound that confirmed gallstones present in the gallbladder, but the gallbladder was normal in size and wall thickness without evidence for acute  cholecystitis.  No interventions appear necessary at this point, and patient should have followup in the outpatient setting.   3.  HSV:  Resume PTA Valacyclovir 500 mg daily.    4.  Mild transaminitis:  This could be secondary to cholelithiasis, as well as a small-bowel obstruction.  I will repeat a comprehensive metabolic panel in the morning and continue with IV fluids and analgesic management for small-bowel obstruction.   5.  Incidental right lower pole renal cyst:  This was found on the right upper quadrant limited ultrasound, measuring 2.4 cm.  No interventions appear necessary.  The patient will follow up with primary care provider.   6.  Deep vein thrombosis (DVT) prophylaxis:  Will place patient on PCDS.      CODE STATUS:  Discussed with the patient and her .  They elect to be full code.      DISPOSITION:  The patient is being admitted under inpatient status due to small-bowel obstruction.  I believe the patient will be hospitalized for a minimum of 2 evenings while undergoing continued evaluation and management.      The patient was discussed with Dr. Jeison Watts, who agrees with the assessment and plan as stated above.  Dr. Gatica will evaluate the patient independently.  Please cc this to primary care provider.         JEISON GATICA MD       As dictated by JOSEPH ARANGO PA-C            D: 2018   T: 2018   MT: EMMY      Name:     DONNA TELLES   MRN:      [id number]        Account:      [id number]   :      1960        Admitted:     2018                   Document: [id number]

## 2018-12-25 NOTE — PLAN OF CARE
Pt A&O. VSS on RA. Pain in LUQ abdomen controlled with dilaudid 0.3 mg x1. Emesis x1 after clamping for med- relieved when NG back to LIS. Small amount of white output. RPIV infusing  ml/hr. Up SBA. Plan for conservative management and abdominal xray tomorrow AM.

## 2018-12-25 NOTE — ED NOTES
"[facility]  ED Nurse Handoff Report    ED Chief complaint: Abdominal Pain (generalized abdominal pain since last evening with n/v)      ED Diagnosis:   Final diagnoses:   SBO (small bowel obstruction) (H)   Calculus of gallbladder without cholecystitis without obstruction       Code Status: Full Code    Allergies:   Allergies   Allergen Reactions     No Known Allergies        Activity level - Baseline/Home:  Independent    Activity Level - Current:   Independent     Needed?: No    Isolation: No  Infection: Not Applicable  Bariatric?: No    Vital Signs:   Vitals:    12/25/18 0552 12/25/18 0558   BP: 132/71    Pulse: 87    Resp: 20    Temp:  98.1  F (36.7  C)   TempSrc:  Oral   SpO2: 100%    Weight: 54.4 kg (120 lb)    Height: 1.549 m (5' 1\")        Cardiac Rhythm: ,        Pain level: 0-10 Pain Scale: 10    Is this patient confused?: No   Does this patient have a guardian?  No         If yes, is there guardianship documents in the Epic \"Code/ACP\" activity?  N/A         Guardian Notified?  N/A  [place] - Suicide Severity Rating Scale Completed?  Yes  If yes, what color did the patient score?  White    Patient Report: Initial Complaint: Ab pain  Focused Assessment: Since 5pm last night, ab pain with N/V.  CT shows gallstones without obstruction and SBO.  Sent for US - pending results.  Patient given Morphine on arrival, has refused an additional intervention.  NS bolus.  Zofran x1.  Will place NG tube when patient is back from US. Spouse at bedside.  Tests Performed: blood work, CT, US  Abnormal Results:   Labs Ordered and Resulted from Time of ED Arrival Up to the Time of Departure from the ED   BASIC METABOLIC PANEL - Abnormal; Notable for the following components:       Result Value    Glucose 145 (*)     All other components within normal limits   HEPATIC PANEL - Abnormal; Notable for the following components:    ALT 53 (*)     AST 47 (*)     All other components within normal limits "   CBC WITH PLATELETS DIFFERENTIAL   LIPASE       Treatments provided: see above    Family Comments: spouse    OBS brochure/video discussed/provided to patient/family: N/A              Name of person given brochure if not patient:               Relationship to patient:     ED Medications:   Medications   ondansetron (ZOFRAN) injection 4 mg (4 mg Intravenous Given 12/25/18 0637)   HYDROmorphone (PF) (DILAUDID) injection 0.5 mg (0.5 mg Intravenous Not Given 12/25/18 0727)   morphine (PF) injection 4 mg (4 mg Intravenous Given 12/25/18 0637)   0.9% sodium chloride BOLUS (1,000 mLs Intravenous New Bag 12/25/18 0725)   lidocaine VISCOUS (XYLOCAINE) 2 % 15 mL, alum & mag hydroxide-simethicone (MYLANTA ES/MAALOX  ES) 15 mL GI Cocktail (30 mLs Oral Given 12/25/18 0727)   iopamidol (ISOVUE-370) solution 60 mL (60 mLs Intravenous Given 12/25/18 0807)   Saline Flush (60 mLs Intravenous Given 12/25/18 0806)       Drips infusing?:  No    For the majority of the shift this patient was Green.   Interventions performed were .    Severe Sepsis OR Septic Shock Diagnosis Present: No    To be done/followed up on inpatient unit:  NA    ED NURSE PHONE NUMBER: *[phone]

## 2018-12-26 ENCOUNTER — APPOINTMENT (OUTPATIENT)
Dept: GENERAL RADIOLOGY | Facility: CLINIC | Age: 58
DRG: 390 | End: 2018-12-26
Attending: SURGERY
Payer: COMMERCIAL

## 2018-12-26 LAB
ALBUMIN SERPL-MCNC: 3.1 G/DL (ref 3.4–5)
ALP SERPL-CCNC: 74 U/L (ref 40–150)
ALT SERPL W P-5'-P-CCNC: 37 U/L (ref 0–50)
ANION GAP SERPL CALCULATED.3IONS-SCNC: 7 MMOL/L (ref 3–14)
AST SERPL W P-5'-P-CCNC: 23 U/L (ref 0–45)
BILIRUB SERPL-MCNC: 0.8 MG/DL (ref 0.2–1.3)
BUN SERPL-MCNC: 12 MG/DL (ref 7–30)
CALCIUM SERPL-MCNC: 7.9 MG/DL (ref 8.5–10.1)
CHLORIDE SERPL-SCNC: 110 MMOL/L (ref 94–109)
CO2 SERPL-SCNC: 24 MMOL/L (ref 20–32)
CREAT SERPL-MCNC: 0.5 MG/DL (ref 0.52–1.04)
ERYTHROCYTE [DISTWIDTH] IN BLOOD BY AUTOMATED COUNT: 13.6 % (ref 10–15)
GFR SERPL CREATININE-BSD FRML MDRD: >90 ML/MIN/{1.73_M2}
GLUCOSE SERPL-MCNC: 98 MG/DL (ref 70–99)
HCT VFR BLD AUTO: 40.2 % (ref 35–47)
HGB BLD-MCNC: 13.5 G/DL (ref 11.7–15.7)
MCH RBC QN AUTO: 29.7 PG (ref 26.5–33)
MCHC RBC AUTO-ENTMCNC: 33.6 G/DL (ref 31.5–36.5)
MCV RBC AUTO: 88 FL (ref 78–100)
PLATELET # BLD AUTO: 171 10E9/L (ref 150–450)
POTASSIUM SERPL-SCNC: 4 MMOL/L (ref 3.4–5.3)
PROT SERPL-MCNC: 6.5 G/DL (ref 6.8–8.8)
RBC # BLD AUTO: 4.55 10E12/L (ref 3.8–5.2)
SODIUM SERPL-SCNC: 141 MMOL/L (ref 133–144)
WBC # BLD AUTO: 5.5 10E9/L (ref 4–11)

## 2018-12-26 PROCEDURE — 12000000 ZZH R&B MED SURG/OB

## 2018-12-26 PROCEDURE — 25000128 H RX IP 250 OP 636: Performed by: PHYSICIAN ASSISTANT

## 2018-12-26 PROCEDURE — 99232 SBSQ HOSP IP/OBS MODERATE 35: CPT | Performed by: INTERNAL MEDICINE

## 2018-12-26 PROCEDURE — 25000132 ZZH RX MED GY IP 250 OP 250 PS 637: Performed by: PHYSICIAN ASSISTANT

## 2018-12-26 PROCEDURE — 99232 SBSQ HOSP IP/OBS MODERATE 35: CPT | Performed by: SURGERY

## 2018-12-26 PROCEDURE — 27210995 ZZH RX 272: Performed by: INTERNAL MEDICINE

## 2018-12-26 PROCEDURE — 74019 RADEX ABDOMEN 2 VIEWS: CPT

## 2018-12-26 PROCEDURE — 80053 COMPREHEN METABOLIC PANEL: CPT | Performed by: PHYSICIAN ASSISTANT

## 2018-12-26 PROCEDURE — C9113 INJ PANTOPRAZOLE SODIUM, VIA: HCPCS | Performed by: INTERNAL MEDICINE

## 2018-12-26 PROCEDURE — 99207 ZZC CDG-MDM COMPONENT: MEETS LOW - DOWN CODED: CPT | Performed by: INTERNAL MEDICINE

## 2018-12-26 PROCEDURE — 85027 COMPLETE CBC AUTOMATED: CPT | Performed by: PHYSICIAN ASSISTANT

## 2018-12-26 PROCEDURE — 36415 COLL VENOUS BLD VENIPUNCTURE: CPT | Performed by: PHYSICIAN ASSISTANT

## 2018-12-26 PROCEDURE — 25000128 H RX IP 250 OP 636: Performed by: INTERNAL MEDICINE

## 2018-12-26 RX ORDER — SODIUM CHLORIDE 450 MG/100ML
INJECTION, SOLUTION INTRAVENOUS CONTINUOUS
Status: DISCONTINUED | OUTPATIENT
Start: 2018-12-26 | End: 2018-12-28 | Stop reason: HOSPADM

## 2018-12-26 RX ADMIN — PANTOPRAZOLE SODIUM 40 MG: 40 INJECTION, POWDER, FOR SOLUTION INTRAVENOUS at 08:06

## 2018-12-26 RX ADMIN — SODIUM CHLORIDE: 9 INJECTION, SOLUTION INTRAVENOUS at 05:43

## 2018-12-26 RX ADMIN — SODIUM CHLORIDE: 4.5 INJECTION, SOLUTION INTRAVENOUS at 10:15

## 2018-12-26 RX ADMIN — VALACYCLOVIR HYDROCHLORIDE 500 MG: 500 TABLET, FILM COATED ORAL at 08:07

## 2018-12-26 RX ADMIN — ACETAMINOPHEN 650 MG: 325 TABLET, FILM COATED ORAL at 20:37

## 2018-12-26 RX ADMIN — SODIUM CHLORIDE: 4.5 INJECTION, SOLUTION INTRAVENOUS at 19:27

## 2018-12-26 ASSESSMENT — MIFFLIN-ST. JEOR: SCORE: 1061.23

## 2018-12-26 ASSESSMENT — ACTIVITIES OF DAILY LIVING (ADL)
ADLS_ACUITY_SCORE: 12
ADLS_ACUITY_SCORE: 13

## 2018-12-26 NOTE — PLAN OF CARE
[id number]: A&Ox4. VSS on RA. LUQ pain rating at 4/10, declined medication intervention at this time. IV dilaudid available PRN. NG tube at LIS, small whitish output, marked. Faint BS. NPO. No N&V this shift. Up SBA. PIV infusing. Plan for XR abdomen tomorrow.

## 2018-12-26 NOTE — PLAN OF CARE
Patient up with SBA. NG clamped at 0800 to trial, patient tolerating, diet advanced to clear liquids. Slight abdominal tenderness/cramping at times, denies nausea this shift. +Flatus.

## 2018-12-26 NOTE — PROGRESS NOTES
Surgery    Patient feeling better today.  Had nausea yesterday when NG tube was clamped and placed back on suction.  Gastroview challenge was not performed due to above.  She states she has started to pass some flatus today.  She denies any significant pain but does have cramps on occasion.  No nausea since NG tube clamped this morning.  She feels hungry.    Abdomen-soft with minimal distention.  No significant tenderness.    A/P  Feeling much better today.  No nausea with NG tube clamped and has started to pass flatus. AXR looks improved as well.  I offered the patient a trial of oral intake versus Gastroview challenge.  She has elected to go forward with oral intake.  Plan to keep NG tube in place and slowly start clears.  If she does well and continues to have flatus, the NG tube can be removed and a diet initiated.  If she does not tolerate plan to place back to suction and will go forward with a Gastroview challenge.  Patient is in agreement to above.    Bert Reynaga M.D.  [facility]  [phone]

## 2018-12-26 NOTE — PLAN OF CARE
Pt is A&Ox4. VSS ex soft BP. Denies pain. NG to LIS. Denies passing flatus and faint bowel sounds noted. PIV infusing. Up SBA. X-ray done this morning, results pending. Possible surgery. Family at bedside. Slept between cares.

## 2018-12-26 NOTE — PROGRESS NOTES
[facility]    Hospitalist Progress Note      Assessment & Plan   Promise Nunez is a 58-year-old female with a past medical history significant for gastroesophageal reflux disease (GERD), HSV  and history of nephrolithiasis who is being admitted to [facility] due to a small-bowel obstruction.     Small-bowel obstruction:  unknown etiology. This was confirmed on CT abdomen and pelvis with contrast.  NG placed on admission, seen by general surgery who completed a gastrografin challenge through NG tube 12/25.  Pt did not tolerate clamping. abd X-ray 12/26 shows mildly dilated loops of small bowel.   -clamping trial again today  -remain NPO  -await general surgery opinion for today    Incidental cholelithiasis:  On abdomen and pelvis CT with contrast, there was a suspicion for cholelithiasis.  Patient underwent an abdominal right upper quadrant limited ultrasound that confirmed gallstones present in the gallbladder, but the gallbladder was normal in size and wall thickness without evidence for acute cholecystitis.    -No interventions appear necessary at this point, and patient should have followup in the outpatient setting.     HSV: continue PTA Valacyclovir 500 mg daily.      Mild transaminitis:  This could be secondary to cholelithiasis, as well as a small-bowel obstruction. Resolved on repeat.     Incidental right lower pole renal cyst:  This was found on the right upper quadrant limited ultrasound, measuring 2.4 cm.    -No interventions appear necessary.  The patient will follow up with primary care provider.     DVT Prophylaxis: Pneumatic Compression Devices  Code Status: Full Code    Disposition: Expected discharge in 1-2 days once SBO resolved, tolerating po.    Arpit Pickering    Interval History   Tried clamping NG last night and had increased symptoms, currently on LIMS.  She has no pain or nausea currently.  Has not had a BM since yesterday morning.  No chest pain or  shortness of breath.  Hoping to avoid surgery.  abd xray from this morning shows mildly prominent small bowel loops.      -Data reviewed today: I reviewed all new labs and imaging results over the last 24 hours. I personally reviewed no images or EKG's today.    Physical Exam   Temp: 98.5  F (36.9  C) Temp src: Oral BP: 97/52 Pulse: 94 Heart Rate: 95 Resp: 16 SpO2: 95 % O2 Device: None (Room air)    Vitals:    12/25/18 0552 12/26/18 0616   Weight: 54.4 kg (120 lb) 54.9 kg (121 lb)     Vital Signs with Ranges  Temp:  [97.4  F (36.3  C)-98.5  F (36.9  C)] 98.5  F (36.9  C)  Pulse:  [91-94] 94  Heart Rate:  [84-95] 95  Resp:  [16-20] 16  BP: ()/(52-84) 97/52  SpO2:  [94 %-98 %] 95 %  I/O last 3 completed shifts:  In: 1818 [I.V.:1818]  Out: -     Constitutional: awake, alert, cooperative    Respiratory: Clear to auscultation bilaterally, no crackles or wheezing noted.  Good air exchange.     Cardiovascular: Regular rate and rhythm.  No murmur, rub or gallop noted.     GI: Positive but slightly hypoactive bowel sounds, soft, non-distended.  Mild tenderness to percussion but not palpation.    Musculoskeletal: Full range of motion.  Tone is normal.  No acute abnormalities.     Neurologic: Awake, alert and oriented to name, place and time.  Cranial nerves II-XII are grossly intact.      Lymphatic: No edema noted    Skin: no rash    Medications     NaCl         diatrizoate meglumine-sodium  90 mL Per NG tube Once     pantoprazole (PROTONIX) IV  40 mg Intravenous Daily with breakfast     sodium chloride (PF)  3 mL Intracatheter Q8H     valACYclovir  500 mg Oral Daily       Data   Recent Labs   Lab 12/26/18  0647 12/25/18  0636   WBC 5.5 9.8   HGB 13.5 15.3   MCV 88 88    208    135   POTASSIUM 4.0 4.1   CHLORIDE 110* 101   CO2 24 26   BUN 12 13   CR 0.50* 0.54   ANIONGAP 7 8   MERYL 7.9* 9.1   GLC 98 145*   ALBUMIN 3.1* 4.3   PROTTOTAL 6.5* 8.4   BILITOTAL 0.8 0.8   ALKPHOS 74 102   ALT 37 53*   AST 23 47*    LIPASE  --  161       Recent Results (from the past 24 hour(s))   CT Abdomen Pelvis w Contrast    Narrative    CT ABDOMEN AND PELVIS WITH CONTRAST 12/25/2018 8:11 AM     HISTORY: Abdominal pain, unspecified.    CONTRAST DOSE:  60 mL Isovue-370    Radiation dose for this scan was reduced using automated exposure  control, adjustment of the mA and/or kV according to patient size, or  iterative reconstruction technique.    FINDINGS:  1 cm noncalcified gallstones are suspected. Right renal  lower pole 2.5 cm cyst is noted. The liver, spleen, adrenal glands,  and pancreas appear within normal limits. There are multiple fluid  distended loops of small bowel which measure up to 4 cm in diameter  within the pelvis. Given fecalized material within the small bowel  within the left hemipelvis, the site of obstruction is probably within  the mid small bowel in the left hemipelvis. The distal small bowel is  relatively decompressed. There is a small amount of free peritoneal  fluid within the pelvis. No free peritoneal air. Pelvic contents are  otherwise unremarkable.      Impression    IMPRESSION:  1. Small bowel obstruction. The site of obstruction appears to be in  the left hemipelvis within the mid small bowel. Small amount of free  peritoneal fluid is noted in the pelvis. No free peritoneal air.  2. Suspected cholelithiasis.    MILI JONES MD   US Abdomen Limited    Narrative    RIGHT UPPER QUADRANT ULTRASOUND  12/25/2018 9:00 AM    HISTORY:  Abdominal pain. Small bowel obstruction but also mildly  increased liver function tests with gallstones seen on CT    COMPARISON: None.    FINDINGS:    Gallbladder: There are gallstones in the gallbladder. Gallbladder is  normal in size and wall thickness with no focal tenderness.    Bile ducts:   CHD is normal diameter.  No intrahepatic biliary  dilatation.    Liver:   Normal.     Pancreas:   Normal.     Right kidney:  There is a 2.4 cm diameter right lower pole renal cyst.       Impression    IMPRESSION:    1. Cholelithiasis with no evidence of acute cholecystitis.  2. Right lower pole renal cyst.    ANTONIO COTTER MD   XR Abdomen 2 Views    Narrative    ABDOMEN TWO VIEWS  12/26/2018 6:31 AM     HISTORY: Abdominal pain.    COMPARISON: None.      Impression    IMPRESSION: Enteric tube has been placed, with tip in the gastric  body. A few mildly prominent loops of small bowel are noted within the  mid abdomen. Bowel gas pattern is otherwise within normal limits. No  free intraperitoneal air.

## 2018-12-26 NOTE — PLAN OF CARE
AO. VSS on RA. LUQ melquiades, given dilaudid x 1 and declining oral medications due to difficulty earlier in the day. NG to LIS, white output. Denies nausea. - flatus, faint bowel sounds. IV infusing. SBA, calls appropriately. Plan for xray tomorrow.

## 2018-12-26 NOTE — PROGRESS NOTES
SPIRITUAL HE[facility] SERVICES Progress Note  [facility]    Pt had a request for communion, but was unable to receive it at this time due to her current condition. Pt wishes to receive communion when she is able. SH oriented Pt with how to request SH services when she is ready. Pt requested a prayer. No other needs at this time.     SH provided a kind and caring presence, listening, hospitality, prayer.     SH will follow and visit when needs arise.       Drake Soto  Chaplain Resident

## 2018-12-27 LAB
ANION GAP SERPL CALCULATED.3IONS-SCNC: 9 MMOL/L (ref 3–14)
BUN SERPL-MCNC: 6 MG/DL (ref 7–30)
CALCIUM SERPL-MCNC: 7.9 MG/DL (ref 8.5–10.1)
CHLORIDE SERPL-SCNC: 108 MMOL/L (ref 94–109)
CO2 SERPL-SCNC: 24 MMOL/L (ref 20–32)
CREAT SERPL-MCNC: 0.52 MG/DL (ref 0.52–1.04)
GFR SERPL CREATININE-BSD FRML MDRD: >90 ML/MIN/{1.73_M2}
GLUCOSE SERPL-MCNC: 88 MG/DL (ref 70–99)
POTASSIUM SERPL-SCNC: 3.8 MMOL/L (ref 3.4–5.3)
SODIUM SERPL-SCNC: 141 MMOL/L (ref 133–144)

## 2018-12-27 PROCEDURE — 99232 SBSQ HOSP IP/OBS MODERATE 35: CPT | Performed by: SURGERY

## 2018-12-27 PROCEDURE — 80048 BASIC METABOLIC PNL TOTAL CA: CPT | Performed by: INTERNAL MEDICINE

## 2018-12-27 PROCEDURE — 25000128 H RX IP 250 OP 636: Performed by: INTERNAL MEDICINE

## 2018-12-27 PROCEDURE — 25000132 ZZH RX MED GY IP 250 OP 250 PS 637: Performed by: STUDENT IN AN ORGANIZED HEALTH CARE EDUCATION/TRAINING PROGRAM

## 2018-12-27 PROCEDURE — 25000132 ZZH RX MED GY IP 250 OP 250 PS 637: Performed by: PHYSICIAN ASSISTANT

## 2018-12-27 PROCEDURE — 12000000 ZZH R&B MED SURG/OB

## 2018-12-27 PROCEDURE — 36415 COLL VENOUS BLD VENIPUNCTURE: CPT | Performed by: INTERNAL MEDICINE

## 2018-12-27 PROCEDURE — 99232 SBSQ HOSP IP/OBS MODERATE 35: CPT | Performed by: STUDENT IN AN ORGANIZED HEALTH CARE EDUCATION/TRAINING PROGRAM

## 2018-12-27 PROCEDURE — 27210995 ZZH RX 272: Performed by: INTERNAL MEDICINE

## 2018-12-27 PROCEDURE — C9113 INJ PANTOPRAZOLE SODIUM, VIA: HCPCS | Performed by: INTERNAL MEDICINE

## 2018-12-27 PROCEDURE — 25000132 ZZH RX MED GY IP 250 OP 250 PS 637: Performed by: INTERNAL MEDICINE

## 2018-12-27 RX ADMIN — Medication 1 LOZENGE: at 23:53

## 2018-12-27 RX ADMIN — Medication 1 LOZENGE: at 21:42

## 2018-12-27 RX ADMIN — DIATRIZOATE MEGLUMINE AND DIATRIZOATE SODIUM 90 ML: 660; 100 SOLUTION ORAL; RECTAL at 11:16

## 2018-12-27 RX ADMIN — Medication 1 SPRAY: at 02:22

## 2018-12-27 RX ADMIN — SODIUM CHLORIDE: 4.5 INJECTION, SOLUTION INTRAVENOUS at 15:52

## 2018-12-27 RX ADMIN — VALACYCLOVIR HYDROCHLORIDE 500 MG: 500 TABLET, FILM COATED ORAL at 09:47

## 2018-12-27 RX ADMIN — PANTOPRAZOLE SODIUM 40 MG: 40 INJECTION, POWDER, FOR SOLUTION INTRAVENOUS at 09:47

## 2018-12-27 RX ADMIN — Medication 2 SPRAY: at 21:42

## 2018-12-27 RX ADMIN — SODIUM CHLORIDE: 4.5 INJECTION, SOLUTION INTRAVENOUS at 05:39

## 2018-12-27 ASSESSMENT — ACTIVITIES OF DAILY LIVING (ADL)
ADLS_ACUITY_SCORE: 13

## 2018-12-27 ASSESSMENT — MIFFLIN-ST. JEOR: SCORE: 1051.25

## 2018-12-27 NOTE — PLAN OF CARE
Pt is A&Ox4. VSS. NG clamped since yesterday- tolerating. C/o of occasional intermittent cramping, but tolerating. Clear liquid diet. Passing flatus. BS active. Denies nausea. Up SBA. PIV infusing. C/o of nasal discomfort/congestion- PRN nasal spray ordered and given, effective. Discharge pending ability to tolerate PO. Slept between cares.

## 2018-12-27 NOTE — PROGRESS NOTES
Surgery    Passing a little flatus but still feels multiple episodes of crampy abdominal pain. No nausea since NG tube clamped but has not tolerated a significant amount of liquids. No new abdominal pain.    Abdomen-Soft without significant tenderness. Mild distention.    A/P  Some progression but still appears to have at least a partial small bowel obstruction. I would recommend a Gastroview challenge today and will follow up with imaging in the morning. The tube should remain clamped unless she develops increased abdominal distention or pain. If she does not show progression of the contrast by tomorrow, I would recommend going forward with exploratory laparoscopy and possible laparotomy. Patient is in agreement.    Bert Reynaga M.D.  [facility]  [phone]

## 2018-12-27 NOTE — PROGRESS NOTES
[facility]    Hospitalist Progress Note      Assessment & Plan   Promise Nunez is a 58-year-old female with a past medical history significant for gastroesophageal reflux disease (GERD), HSV  and history of nephrolithiasis who is being admitted to [facility] due to a small-bowel obstruction.     Small-bowel obstruction:  unknown etiology. This was confirmed on CT abdomen and pelvis with contrast.  NG placed on admission, seen by general surgery who completed a gastrografin challenge through NG tube 12/25.  Pt did not tolerate clamping. abd X-ray 12/26 shows mildly dilated loops of small bowel.   -Keep NG tube clamped  -Gastroview challenge today  -remain NPO  -IVF given minimal PO intake    Incidental cholelithiasis:  On abdomen and pelvis CT with contrast, there was a suspicion for cholelithiasis.  Patient underwent an abdominal right upper quadrant limited ultrasound that confirmed gallstones present in the gallbladder, but the gallbladder was normal in size and wall thickness without evidence for acute cholecystitis.    -No interventions appear necessary at this point, and patient should have followup in the outpatient setting.     HSV: continue PTA Valacyclovir 500 mg daily.      Mild transaminitis:  This could be secondary to cholelithiasis, as well as a small-bowel obstruction. Resolved on repeat.     Incidental right lower pole renal cyst:  This was found on the right upper quadrant limited ultrasound, measuring 2.4 cm.    -No interventions appear necessary.  The patient will follow up with primary care provider.     DVT Prophylaxis: Pneumatic Compression Devices  Code Status: Full Code    Disposition: Expected discharge pending gastroview +/- surgery.    Natan Grant    Interval History     NG clamped, not on suction  Not hungry, no nausea/vomiting  Passing flatus, but no BMs  Mild abdominal pain with PO intake.  No CP/SOB, no new complaints otherwise.     -Data reviewed  today: I reviewed all new labs and imaging results over the last 24 hours. I personally reviewed no images or EKG's today.    Physical Exam   Temp: 98  F (36.7  C) Temp src: Oral BP: 117/65   Heart Rate: 98 Resp: 18 SpO2: 96 % O2 Device: None (Room air)    Vitals:    12/25/18 0552 12/26/18 0616 12/27/18 0500   Weight: 54.4 kg (120 lb) 54.9 kg (121 lb) 53.9 kg (118 lb 12.8 oz)     Vital Signs with Ranges  Temp:  [96.6  F (35.9  C)-99.2  F (37.3  C)] 98  F (36.7  C)  Heart Rate:  [87-98] 98  Resp:  [16-18] 18  BP: (108-124)/(56-66) 117/65  SpO2:  [96 %-99 %] 96 %  I/O last 3 completed shifts:  In: 100 [P.O.:100]  Out: 100 [Emesis/NG output:100]    Constitutional: awake, alert, cooperative  Respiratory: Clear to auscultation bilaterally, no crackles or wheezing noted.  Good air exchange.   Cardiovascular: Regular rate and rhythm.  No murmur, rub or gallop noted.   GI: Positive but slightly hypoactive bowel sounds, soft, non-distended.  Mild tenderness to palpation.   Musculoskeletal: Full range of motion.  Tone is normal.  No acute abnormalities.   Neurologic: Awake, alert and oriented to name, place and time.  Cranial nerves II-XII are grossly intact.    Lymphatic: No edema noted  Neuro: A/Ox3. Moving all extremities    Medications     NaCl 100 mL/hr at 12/27/18 0539       diatrizoate meglumine-sodium  90 mL Per NG tube Once     diatrizoate meglumine-sodium  90 mL Per NG tube Once     pantoprazole (PROTONIX) IV  40 mg Intravenous Daily with breakfast     sodium chloride (PF)  3 mL Intracatheter Q8H     valACYclovir  500 mg Oral Daily       Data   Recent Labs   Lab 12/27/18  0801 12/26/18  0647 12/25/18  0636   WBC  --  5.5 9.8   HGB  --  13.5 15.3   MCV  --  88 88   PLT  --  171 208    141 135   POTASSIUM 3.8 4.0 4.1   CHLORIDE 108 110* 101   CO2 24 24 26   BUN 6* 12 13   CR 0.52 0.50* 0.54   ANIONGAP 9 7 8   MERYL 7.9* 7.9* 9.1   GLC 88 98 145*   ALBUMIN  --  3.1* 4.3   PROTTOTAL  --  6.5* 8.4   BILITOTAL  --   0.8 0.8   ALKPHOS  --  74 102   ALT  --  37 53*   AST  --  23 47*   LIPASE  --   --  161       No results found for this or any previous visit (from the past 24 hour(s)).

## 2018-12-27 NOTE — PLAN OF CARE
Pt A&Ox4. VSS on RA. Pt states she is feeling very weak. Pt c/o headache, PRN tylenol given x1. NG clamped since 0800, advanced to clear liquid diet, c/o intermittent cramping but denies any significant pain in abdomen. Positive for flatus, BS active, faint at times, denies nausea. PIV infusing NS @ 100ml/hr. Up SBA, ambulated halls x1, tolerated well. Discharge pending.

## 2018-12-28 ENCOUNTER — APPOINTMENT (OUTPATIENT)
Dept: GENERAL RADIOLOGY | Facility: CLINIC | Age: 58
DRG: 390 | End: 2018-12-28
Attending: SURGERY
Payer: COMMERCIAL

## 2018-12-28 VITALS
OXYGEN SATURATION: 95 % | SYSTOLIC BLOOD PRESSURE: 126 MMHG | HEIGHT: 61 IN | WEIGHT: 118 LBS | HEART RATE: 88 BPM | BODY MASS INDEX: 22.28 KG/M2 | DIASTOLIC BLOOD PRESSURE: 66 MMHG | RESPIRATION RATE: 16 BRPM | TEMPERATURE: 99 F

## 2018-12-28 LAB
ANION GAP SERPL CALCULATED.3IONS-SCNC: 7 MMOL/L (ref 3–14)
BUN SERPL-MCNC: 5 MG/DL (ref 7–30)
CALCIUM SERPL-MCNC: 8.2 MG/DL (ref 8.5–10.1)
CHLORIDE SERPL-SCNC: 107 MMOL/L (ref 94–109)
CO2 SERPL-SCNC: 27 MMOL/L (ref 20–32)
CREAT SERPL-MCNC: 0.48 MG/DL (ref 0.52–1.04)
GFR SERPL CREATININE-BSD FRML MDRD: >90 ML/MIN/{1.73_M2}
GLUCOSE SERPL-MCNC: 85 MG/DL (ref 70–99)
POTASSIUM SERPL-SCNC: 3.3 MMOL/L (ref 3.4–5.3)
SODIUM SERPL-SCNC: 141 MMOL/L (ref 133–144)

## 2018-12-28 PROCEDURE — 25000132 ZZH RX MED GY IP 250 OP 250 PS 637: Performed by: PHYSICIAN ASSISTANT

## 2018-12-28 PROCEDURE — 25000128 H RX IP 250 OP 636: Performed by: INTERNAL MEDICINE

## 2018-12-28 PROCEDURE — 99239 HOSP IP/OBS DSCHRG MGMT >30: CPT | Performed by: STUDENT IN AN ORGANIZED HEALTH CARE EDUCATION/TRAINING PROGRAM

## 2018-12-28 PROCEDURE — C9113 INJ PANTOPRAZOLE SODIUM, VIA: HCPCS | Performed by: INTERNAL MEDICINE

## 2018-12-28 PROCEDURE — 80048 BASIC METABOLIC PNL TOTAL CA: CPT | Performed by: STUDENT IN AN ORGANIZED HEALTH CARE EDUCATION/TRAINING PROGRAM

## 2018-12-28 PROCEDURE — 36415 COLL VENOUS BLD VENIPUNCTURE: CPT | Performed by: STUDENT IN AN ORGANIZED HEALTH CARE EDUCATION/TRAINING PROGRAM

## 2018-12-28 PROCEDURE — 25000132 ZZH RX MED GY IP 250 OP 250 PS 637: Performed by: STUDENT IN AN ORGANIZED HEALTH CARE EDUCATION/TRAINING PROGRAM

## 2018-12-28 PROCEDURE — 27210995 ZZH RX 272: Performed by: INTERNAL MEDICINE

## 2018-12-28 PROCEDURE — 74018 RADEX ABDOMEN 1 VIEW: CPT

## 2018-12-28 PROCEDURE — 99231 SBSQ HOSP IP/OBS SF/LOW 25: CPT | Performed by: SURGERY

## 2018-12-28 RX ORDER — POTASSIUM CHLORIDE 1500 MG/1
20 TABLET, EXTENDED RELEASE ORAL ONCE
Status: COMPLETED | OUTPATIENT
Start: 2018-12-28 | End: 2018-12-28

## 2018-12-28 RX ADMIN — Medication 1 ML: at 07:45

## 2018-12-28 RX ADMIN — SODIUM CHLORIDE: 4.5 INJECTION, SOLUTION INTRAVENOUS at 01:58

## 2018-12-28 RX ADMIN — PANTOPRAZOLE SODIUM 40 MG: 40 INJECTION, POWDER, FOR SOLUTION INTRAVENOUS at 09:18

## 2018-12-28 RX ADMIN — Medication 1 ML: at 13:36

## 2018-12-28 RX ADMIN — POTASSIUM CHLORIDE 20 MEQ: 1500 TABLET, EXTENDED RELEASE ORAL at 17:20

## 2018-12-28 RX ADMIN — ACETAMINOPHEN 650 MG: 325 TABLET, FILM COATED ORAL at 13:35

## 2018-12-28 RX ADMIN — Medication 1 ML: at 11:01

## 2018-12-28 RX ADMIN — Medication 1 ML: at 09:17

## 2018-12-28 RX ADMIN — SODIUM CHLORIDE: 4.5 INJECTION, SOLUTION INTRAVENOUS at 11:51

## 2018-12-28 ASSESSMENT — ACTIVITIES OF DAILY LIVING (ADL)
ADLS_ACUITY_SCORE: 13

## 2018-12-28 ASSESSMENT — MIFFLIN-ST. JEOR: SCORE: 1047.62

## 2018-12-28 NOTE — PROGRESS NOTES
[facility]    Hospitalist Progress Note    Date of Service: 12/28/2018    Assessment & Plan      Promise Nunez is a 58-year-old female with a past medical history significant for gastroesophageal reflux disease (GERD), HSV  and history of nephrolithiasis who is being admitted to [facility] due to a small-bowel obstruction.     Small-bowel obstruction:  unknown etiology. This was confirmed on CT abdomen and pelvis with contrast.  NG placed on admission, seen by general surgery who completed a gastrografin challenge through NG tube 12/25.  Pt did not tolerate clamping. abd X-ray 12/26 shows mildly dilated loops of small bowel. Gastroview challenge shows high-attenuation material,  likely contrast is present throughout the colon. No evidence of obstruction.  - Keep NG tube clamped, possibly remove today  - Diet advancement per surgery  - Continue IVF until consistent PO intake    Incidental cholelithiasis:  On abdomen and pelvis CT with contrast, there was a suspicion for cholelithiasis.  Patient underwent an abdominal right upper quadrant limited ultrasound that confirmed gallstones present in the gallbladder, but the gallbladder was normal in size and wall thickness without evidence for acute cholecystitis.    -No interventions appear necessary at this point, and patient should have followup in the outpatient setting.     HSV: continue PTA Valacyclovir 500 mg daily.      Mild transaminitis:  This could be secondary to cholelithiasis, as well as a small-bowel obstruction. Resolved on repeat.     Incidental right lower pole renal cyst:  This was found on the right upper quadrant limited ultrasound, measuring 2.4 cm.    -No interventions appear necessary.  The patient will follow up with primary care provider.     DVT Prophylaxis: Pneumatic Compression Devices  Code Status: Full Code    Disposition: Expected discharge pending gastroview +/- surgery.    Natan Grant    Interval History      No acute events overnight  Tolerated gastroview without issue  Passing flatus, having BMs  No nausea/vomiting/abdominal pain  No new complaints otherwise    -Data reviewed today: I reviewed all new labs and imaging results over the last 24 hours. I personally reviewed no images or EKG's today.    Physical Exam   Temp: 99  F (37.2  C) Temp src: Oral BP: 126/66 Pulse: 88 Heart Rate: 93 Resp: 16 SpO2: 95 % O2 Device: None (Room air)    Vitals:    12/26/18 0616 12/27/18 0500 12/28/18 0606   Weight: 54.9 kg (121 lb) 53.9 kg (118 lb 12.8 oz) 53.5 kg (118 lb)     Vital Signs with Ranges  Temp:  [98.8  F (37.1  C)-99  F (37.2  C)] 99  F (37.2  C)  Pulse:  [88] 88  Heart Rate:  [93] 93  Resp:  [16-18] 16  BP: (126-130)/(66-71) 126/66  SpO2:  [95 %-98 %] 95 %  I/O last 3 completed shifts:  In: 240 [P.O.:240]  Out: 525 [Urine:400; Stool:125]    Constitutional: awake, alert, cooperative  Respiratory: CTAB  Cardiovascular: RRR with no m/r/g  GI: Positive but slightly hypoactive bowel sounds, soft, non-distended.  No tenderness to palpation.   Musculoskeletal: Full range of motion.  Tone is normal.  No acute abnormalities.   Neurologic: Awake, alert and oriented to name, place and time.  Cranial nerves II-XII are grossly intact.    Lymphatic: No edema noted  Neuro: A/Ox3. Moving all extremities    Medications     NaCl 100 mL/hr at 12/28/18 1151       diatrizoate meglumine-sodium  90 mL Per NG tube Once     pantoprazole (PROTONIX) IV  40 mg Intravenous Daily with breakfast     sodium chloride (PF)  3 mL Intracatheter Q8H     valACYclovir  500 mg Oral Daily       Data   Recent Labs   Lab 12/28/18  0740 12/27/18  0801 12/26/18  0647 12/25/18  0636   WBC  --   --  5.5 9.8   HGB  --   --  13.5 15.3   MCV  --   --  88 88   PLT  --   --  171 208    141 141 135   POTASSIUM 3.3* 3.8 4.0 4.1   CHLORIDE 107 108 110* 101   CO2 27 24 24 26   BUN 5* 6* 12 13   CR 0.48* 0.52 0.50* 0.54   ANIONGAP 7 9 7 8   MERYL 8.2* 7.9* 7.9* 9.1   GLC  85 88 98 145*   ALBUMIN  --   --  3.1* 4.3   PROTTOTAL  --   --  6.5* 8.4   BILITOTAL  --   --  0.8 0.8   ALKPHOS  --   --  74 102   ALT  --   --  37 53*   AST  --   --  23 47*   LIPASE  --   --   --  161       Recent Results (from the past 24 hour(s))   XR Abdomen 1 View    Narrative    ABDOMEN ONE VIEW   12/28/2018 8:57 AM     HISTORY: Small bowel obstruction.    COMPARISON: 12/26/2018      Impression    IMPRESSION: Enteric tube side port is near the gastroesophageal  junction. This could be advanced 3-5 cm. High-attenuation material,  likely contrast is present throughout the colon. No evidence of  obstruction. No air-filled dilated loops of large or small bowel are  identified. No evidence of pneumatosis, free air, or portal venous  gas.    ANTONIO LEMON MD

## 2018-12-28 NOTE — DISCHARGE INSTRUCTIONS
"1. Read and refer to the attached education sheet for additional discharge information on:   \"Small Bowel Obstruction\" and \"Low Fiber diet\"  2. Call MD if having uncontrolled nausea or vomiting or return of abdominal pain  3. Call MD if having temperature >100.4    "

## 2018-12-28 NOTE — PLAN OF CARE
Yesterday's Gastroview challenge was successful in producing diarrhea and alleviating abd cramping pain.  Has tolerated NG being been clamped for couple days and has tolerated her CL diet without any n/v.   This am continues to deny pain and n/v. Has several loose stools last night and had loose stool this am. Abd xray done and seen by surgeon who ordered discontinue of NG and advancement of diet to low fiber. NG dc'd at 1330. Plan for discontinue home later this kristan if tolerating low residue diet

## 2018-12-28 NOTE — PROGRESS NOTES
A/O, VSS on RA. NG clamped since 12/26 0800. Tolerating clears. +BS, + flatus, +stool. IVF infusing. Up independently in room.  at bedside. Plan for Abd xray today.

## 2018-12-28 NOTE — PROGRESS NOTES
Surgery    Abdominal x-ray shows contrast throughout colon.  Has had multiple bowel movements overnight.  No nausea.  No abdominal pain.  Feels very hungry.    Abdomen-soft with minimal tenderness or distention.    A/P  Greatly improved.  He has had complete resolution of presenting symptoms.  Okay to ADAT and D/C later today if she tolerates.    Bert Reynaga M.D.  [facility]  [phone]

## 2018-12-28 NOTE — PROGRESS NOTES
Patient is A&Ox4. C/o intermittent abdominal cramping at times. Declined medication. Clear liquid diet, tolerating well. NG clamped since 0800 yesterday. Clear liquid diet, tolerating well. Multiple BM's after given Gastrografin today. Positive flatus, active bowel sounds. R PIV infusing 0.45% Normal Saline@100 mL/hr. Up independently in room. Calls appropriately. Plan: Discharge pending. Plan for abdominal x-ray tomorrow.

## 2018-12-28 NOTE — DISCHARGE SUMMARY
[facility]  Hospitalist Discharge Summary       Date of Admission:  12/25/2018  Date of Discharge:  12/28/2018  Discharging Provider: Natan Grant MD      Discharge Diagnoses     Small Bowel Obstruction    Follow-ups Needed After Discharge   Follow-up Appointments     Follow-up and recommended labs and tests       Follow up with primary care provider, Physician No Ref-Primary, within 7 days to evaluate medication change and for hospital follow- up.  The following labs/tests are recommended: BMP.             Unresulted Labs Ordered in the Past 30 Days of this Admission     No orders found from 10/26/2018 to 12/26/2018.        Hospital Course         Promise Nunez is a 58-year-old female with a past medical history significant for gastroesophageal reflux disease (GERD), HSV  and history of nephrolithiasis who is being admitted to [facility] due to a small-bowel obstruction.     Small-bowel obstruction:  unknown etiology. This was confirmed on CT abdomen and pelvis with contrast.  NG placed on admission, seen by general surgery who completed a gastrografin challenge through NG tube 12/25.  Pt did not tolerate clamping initially. abd X-ray 12/26 shows mildly dilated loops of small bowel. Treated supportively and symptoms did improve, Gastroview challenge shows high-attenuation material contrast is present throughout the colon. No evidence of obstruction. Diet was advanced with no complications.   - Low fiber diet then ADAT    Incidental cholelithiasis:  On abdomen and pelvis CT with contrast, there was a suspicion for cholelithiasis.  Patient underwent an abdominal right upper quadrant limited ultrasound that confirmed gallstones present in the gallbladder, but the gallbladder was normal in size and wall thickness without evidence for acute cholecystitis.    -No interventions appear necessary at this point, and patient should have followup in the outpatient setting.     HSV: continue PTA  Valacyclovir 500 mg daily.      Mild transaminitis:  This could be secondary to cholelithiasis, as well as a small-bowel obstruction. Resolved on repeat.     Incidental right lower pole renal cyst:  This was found on the right upper quadrant limited ultrasound, measuring 2.4 cm.    -No interventions appear necessary.  The patient will follow up with primary care provider.     Consultations This Hospital Stay   SURGERY GENERAL IP CONSULT    Code Status   Full Code    Time Spent on this Encounter   I, Natan Grant, personally saw the patient today and spent greater than 30 minutes discharging this patient.       Natan Grant MD  [facility]  ______________________________________________________________________    Physical Exam   Vital Signs: Temp: 99  F (37.2  C) Temp src: Oral BP: 126/66 Pulse: 88 Heart Rate: 93 Resp: 16 SpO2: 95 % O2 Device: None (Room air)    Weight: 118 lbs 0 oz    Constitutional: awake, alert, cooperative  Respiratory: CTAB  Cardiovascular: RRR with no m/r/g  GI: Positive bowel sounds, soft, non-distended.  No tenderness to palpation.   Musculoskeletal: Full range of motion.  Tone is normal.  No acute abnormalities.   Neurologic: Awake, alert and oriented to name, place and time.  Cranial nerves II-XII are grossly intact.    Lymphatic: No edema noted  Neuro: A/Ox3. Moving all extremities          Primary Care Physician   Physician No Ref-Primary    Discharge Disposition   Discharged to home  Condition at discharge: Stable    Significant Results and Procedures   Most Recent 3 CBC's:  Recent Labs   Lab Test 12/26/18  0647 12/25/18  0636 09/28/15  1553   WBC 5.5 9.8 6.9   HGB 13.5 15.3 14.0   MCV 88 88 90    208 196     Most Recent 3 BMP's:  Recent Labs   Lab Test 12/28/18  0740 12/27/18  0801 12/26/18  0647    141 141   POTASSIUM 3.3* 3.8 4.0   CHLORIDE 107 108 110*   CO2 27 24 24   BUN 5* 6* 12   CR 0.48* 0.52 0.50*   ANIONGAP 7 9 7   MERYL 8.2* 7.9* 7.9*   GLC 85 88 98      Most Recent 2 LFT's:  Recent Labs   Lab Test 12/26/18  0647 12/25/18  0636   AST 23 47*   ALT 37 53*   ALKPHOS 74 102   BILITOTAL 0.8 0.8   ,   Results for orders placed or performed during the hospital encounter of 12/25/18   CT Abdomen Pelvis w Contrast    Narrative    CT ABDOMEN AND PELVIS WITH CONTRAST 12/25/2018 8:11 AM     HISTORY: Abdominal pain, unspecified.    CONTRAST DOSE:  60 mL [id number]    Radiation dose for this scan was reduced using automated exposure  control, adjustment of the mA and/or kV according to patient size, or  iterative reconstruction technique.    FINDINGS:  1 cm noncalcified gallstones are suspected. Right renal  lower pole 2.5 cm cyst is noted. The liver, spleen, adrenal glands,  and pancreas appear within normal limits. There are multiple fluid  distended loops of small bowel which measure up to 4 cm in diameter  within the pelvis. Given fecalized material within the small bowel  within the left hemipelvis, the site of obstruction is probably within  the mid small bowel in the left hemipelvis. The distal small bowel is  relatively decompressed. There is a small amount of free peritoneal  fluid within the pelvis. No free peritoneal air. Pelvic contents are  otherwise unremarkable.      Impression    IMPRESSION:  1. Small bowel obstruction. The site of obstruction appears to be in  the left hemipelvis within the mid small bowel. Small amount of free  peritoneal fluid is noted in the pelvis. No free peritoneal air.  2. Suspected cholelithiasis.    MILI JONES MD   US Abdomen Limited    Narrative    RIGHT UPPER QUADRANT ULTRASOUND  12/25/2018 9:00 AM    HISTORY:  Abdominal pain. Small bowel obstruction but also mildly  increased liver function tests with gallstones seen on CT    COMPARISON: None.    FINDINGS:    Gallbladder: There are gallstones in the gallbladder. Gallbladder is  normal in size and wall thickness with no focal tenderness.    Bile ducts:   CHD is normal diameter.   No intrahepatic biliary  dilatation.    Liver:   Normal.     Pancreas:   Normal.     Right kidney:  There is a 2.4 cm diameter right lower pole renal cyst.      Impression    IMPRESSION:    1. Cholelithiasis with no evidence of acute cholecystitis.  2. Right lower pole renal cyst.    ANTONIO COTTER MD   XR Abdomen 2 Views    Narrative    ABDOMEN TWO VIEWS  12/26/2018 6:31 AM     HISTORY: Abdominal pain.    COMPARISON: None.      Impression    IMPRESSION: Enteric tube has been placed, with tip in the gastric  body. A few mildly prominent loops of small bowel are noted within the  mid abdomen. Bowel gas pattern is otherwise within normal limits. No  free intraperitoneal air.    HANNAH LUNA MD   XR Abdomen 1 View    Narrative    ABDOMEN ONE VIEW   12/28/2018 8:57 AM     HISTORY: Small bowel obstruction.    COMPARISON: 12/26/2018      Impression    IMPRESSION: Enteric tube side port is near the gastroesophageal  junction. This could be advanced 3-5 cm. High-attenuation material,  likely contrast is present throughout the colon. No evidence of  obstruction. No air-filled dilated loops of large or small bowel are  identified. No evidence of pneumatosis, free air, or portal venous  gas.    ANTONIO LEMON MD       Discharge Orders      Reason for your hospital stay    You had abdominal pain and found to have a small bowel obstruction     Follow-up and recommended labs and tests     Follow up with primary care provider, Physician No Ref-Primary, within 7 days to evaluate medication change and for hospital follow- up.  The following labs/tests are recommended: BMP.     Activity    Your activity upon discharge: activity as tolerated     Diet    Follow this diet upon discharge: Orders Placed This Encounter      Advance Diet as Tolerated: Low Fiber diet for next two days then transition to regular diet     Discharge Medications   Current Discharge Medication List      CONTINUE these medications which have NOT CHANGED     Details   ibuprofen (ADVIL/MOTRIN) 200 MG tablet Take 200 mg by mouth every 4 hours as needed for mild pain      valACYclovir (VALTREX) 500 MG tablet Take 500 mg by mouth daily as needed            Allergies   Allergies   Allergen Reactions     No Known Allergies

## 2018-12-29 NOTE — PLAN OF CARE
Discharged home at approx 1800. Tolerated her low residue diet well without any n/v or abd pain. Had small loose watery stools this afternoon.  Discharge instructions given to pt and reviewed. Written Kate education materials on SBO and low residue diet was added to discontinue instructions. Pt had no further questions. Pt left with all personal belongings

## 2018-12-31 ENCOUNTER — TELEPHONE (OUTPATIENT)
Dept: FAMILY MEDICINE | Facility: CLINIC | Age: 58
End: 2018-12-31

## 2018-12-31 NOTE — TELEPHONE ENCOUNTER
ED / Discharge Outreach Protocol    Patient Contact    Attempt # 1    Was call answered?  No.  Left message with male - with information to call triage back.     Kary ELLISON RN      [facility]  Hospitalist Discharge Summary       Date of Admission:  12/25/2018  Date of Discharge:  12/28/2018    Follow-ups Needed After Discharge         Follow-up Appointments      Follow-up and recommended labs and tests        Follow up with primary care provider, Physician No Ref-Primary, within 7 days to evaluate medication change and for hospital follow- up.  The following labs/tests are recommended: BMP.          Discharge Orders             Reason for your hospital stay     You had abdominal pain and found to have a small bowel obstruction          Follow-up and recommended labs and tests      Follow up with primary care provider, Physician No Ref-Primary, within 7 days to evaluate medication change and for hospital follow- up.  The following labs/tests are recommended: BMP.          Activity     Your activity upon discharge: activity as tolerated          Diet     Follow this diet upon discharge: Orders Placed This Encounter      Advance Diet as Tolerated: Low Fiber diet for next two days then transition to regular diet         Discharge Medications         Current Discharge Medication List           CONTINUE these medications which have NOT CHANGED     Details   ibuprofen (ADVIL/MOTRIN) 200 MG tablet Take 200 mg by mouth every 4 hours as needed for mild pain       valACYclovir (VALTREX) 500 MG tablet Take 500 mg by mouth daily as needed

## 2019-01-02 NOTE — TELEPHONE ENCOUNTER
ED / Discharge Outreach Protocol    Patient Contact    Attempt # 2    Was call answered?  No.  Left message on voicemail with information to call EVER BEAR RN

## 2019-02-15 ENCOUNTER — HEALTH MAINTENANCE LETTER (OUTPATIENT)
Age: 59
End: 2019-02-15

## 2019-06-04 ENCOUNTER — HOSPITAL ENCOUNTER (EMERGENCY)
Facility: CLINIC | Age: 59
Discharge: HOME | End: 2019-06-04
Attending: NURSE PRACTITIONER | Admitting: NURSE PRACTITIONER
Payer: COMMERCIAL

## 2019-06-04 ENCOUNTER — APPOINTMENT (OUTPATIENT)
Dept: ULTRASOUND IMAGING | Facility: CLINIC | Age: 59
End: 2019-06-04
Attending: NURSE PRACTITIONER
Payer: COMMERCIAL

## 2019-06-04 VITALS
DIASTOLIC BLOOD PRESSURE: 95 MMHG | HEIGHT: 62 IN | RESPIRATION RATE: 16 BRPM | TEMPERATURE: 98.2 F | BODY MASS INDEX: 22.45 KG/M2 | HEART RATE: 91 BPM | SYSTOLIC BLOOD PRESSURE: 123 MMHG | WEIGHT: 122 LBS | OXYGEN SATURATION: 97 %

## 2019-06-04 DIAGNOSIS — M79.661 PAIN OF RIGHT LOWER LEG: ICD-10-CM

## 2019-06-04 DIAGNOSIS — M54.41 RIGHT-SIDED LOW BACK PAIN WITH RIGHT-SIDED SCIATICA: ICD-10-CM

## 2019-06-04 LAB
ANION GAP SERPL CALCULATED.3IONS-SCNC: 7 MMOL/L (ref 3–14)
BASOPHILS # BLD AUTO: 0 10E9/L (ref 0–0.2)
BASOPHILS NFR BLD AUTO: 0.4 %
BUN SERPL-MCNC: 15 MG/DL (ref 7–30)
CALCIUM SERPL-MCNC: 8.6 MG/DL (ref 8.5–10.1)
CHLORIDE SERPL-SCNC: 110 MMOL/L (ref 94–109)
CO2 SERPL-SCNC: 26 MMOL/L (ref 20–32)
CREAT SERPL-MCNC: 0.62 MG/DL (ref 0.52–1.04)
D DIMER PPP FEU-MCNC: <0.3 UG/ML FEU (ref 0–0.5)
DIFFERENTIAL METHOD BLD: NORMAL
EOSINOPHIL # BLD AUTO: 0 10E9/L (ref 0–0.7)
EOSINOPHIL NFR BLD AUTO: 0.8 %
ERYTHROCYTE [DISTWIDTH] IN BLOOD BY AUTOMATED COUNT: 13.5 % (ref 10–15)
GFR SERPL CREATININE-BSD FRML MDRD: >90 ML/MIN/{1.73_M2}
GLUCOSE SERPL-MCNC: 148 MG/DL (ref 70–99)
HCT VFR BLD AUTO: 40.9 % (ref 35–47)
HGB BLD-MCNC: 14 G/DL (ref 11.7–15.7)
IMM GRANULOCYTES # BLD: 0 10E9/L (ref 0–0.4)
IMM GRANULOCYTES NFR BLD: 0.2 %
LYMPHOCYTES # BLD AUTO: 2.6 10E9/L (ref 0.8–5.3)
LYMPHOCYTES NFR BLD AUTO: 51.3 %
MCH RBC QN AUTO: 30.2 PG (ref 26.5–33)
MCHC RBC AUTO-ENTMCNC: 34.2 G/DL (ref 31.5–36.5)
MCV RBC AUTO: 88 FL (ref 78–100)
MONOCYTES # BLD AUTO: 0.4 10E9/L (ref 0–1.3)
MONOCYTES NFR BLD AUTO: 8.6 %
NEUTROPHILS # BLD AUTO: 1.9 10E9/L (ref 1.6–8.3)
NEUTROPHILS NFR BLD AUTO: 38.7 %
NRBC # BLD AUTO: 0 10*3/UL
NRBC BLD AUTO-RTO: 0 /100
PLATELET # BLD AUTO: 215 10E9/L (ref 150–450)
POTASSIUM SERPL-SCNC: 4 MMOL/L (ref 3.4–5.3)
RBC # BLD AUTO: 4.64 10E12/L (ref 3.8–5.2)
SODIUM SERPL-SCNC: 143 MMOL/L (ref 133–144)
WBC # BLD AUTO: 5 10E9/L (ref 4–11)

## 2019-06-04 PROCEDURE — 85379 FIBRIN DEGRADATION QUANT: CPT | Performed by: NURSE PRACTITIONER

## 2019-06-04 PROCEDURE — 80048 BASIC METABOLIC PNL TOTAL CA: CPT | Performed by: NURSE PRACTITIONER

## 2019-06-04 PROCEDURE — 99284 EMERGENCY DEPT VISIT MOD MDM: CPT | Mod: 25

## 2019-06-04 PROCEDURE — 93971 EXTREMITY STUDY: CPT | Mod: RT

## 2019-06-04 PROCEDURE — 85025 COMPLETE CBC W/AUTO DIFF WBC: CPT | Performed by: NURSE PRACTITIONER

## 2019-06-04 ASSESSMENT — ENCOUNTER SYMPTOMS
NUMBNESS: 1
BACK PAIN: 1
WEAKNESS: 0

## 2019-06-04 ASSESSMENT — MIFFLIN-ST. JEOR: SCORE: 1076.64

## 2019-06-04 NOTE — ED PROVIDER NOTES
"  History     Chief Complaint:  Leg Pain      HPI   Promise Nunez is a 59 year old female who presents with leg pain. The patient reports that in the last three weeks she has noticed increasing leg pain, tingling and cold sensation in her legs right greater than left and worse at night. She first noticed this leg pain when she was repositioning herself in bed and felt sharp pain in her lower back. Ibuprofen has helped reduce her pain. She denies any weakness or leg swelling. Of note, the patient reports that she recently traveled to [facility] with her . The patient is otherwise heathy.      Allergies:  No known drug allergies     Medications:    Valtrex  Norco    Past Medical History:    Small bowel obstruction  GERD    Past Surgical History:    History reviewed. No pertinent surgical history.    Family History:    Hypertension  Diabetes     Social History:  Smoking status: Never smoker  Alcohol use: No  Marital Status:   [2]       Review of Systems   Cardiovascular: Negative for leg swelling.   Musculoskeletal: Positive for back pain.   Neurological: Positive for numbness. Negative for weakness.   All other systems reviewed and are negative.    Physical Exam     Patient Vitals for the past 24 hrs:   BP Temp Temp src Pulse Resp SpO2 Height Weight   06/04/19 1410 (!) 123/95 -- -- 91 16 97 % -- --   06/04/19 1222 -- -- -- -- -- 98 % -- --   06/04/19 1221 129/73 98.2  F (36.8  C) Oral 94 16 -- 1.575 m (5' 2\") 55.3 kg (122 lb)     Physical Exam  Nursing notes reviewed. Vitals reviewed.  General: Alert. Well kept.  Eyes:  Conjunctiva non-injected, non-icteric.  Neck/Throat: Moist mucous membranes. Normal voice.  Cardiac: Regular rhythm. Normal heart sounds. No peripheral edema.  2+ DP pulse bilateral.  Warm bilateral lower extremities with normal sensation.  Pulmonary: Clear and equal breath sounds bilaterally.   Musculoskeletal:  No midline tenderness to the spine.  Pain over the right paraspinal muscles " level L4-5 without rash.  Normal movement at the hips/knee/ankles.   Skin:  Warm and dry without rashes.  Neuro:  Normal sensation throughout the legs.  5/5 strength at the hips/knees/ankles.  2+ patellar reflexes.  Normal gait.  Psych:  Normal affect.    Emergency Department Course     Imaging:  Radiographic findings were communicated with the patient who voiced understanding of the findings.    US lower extremity venous duplex right   IMPRESSION:   1. No evidence of right lower extremity deep vein thrombosis or  superficial thrombophlebitis.  2. Right popliteal Baker's cyst  As read by radiology     Laboratory:  BMP: Chloride 110, Glucose 148, WNL (Creatinine 0.62)  CBC: WNL (WBC 5.0, HGB 14.0, )  D dimer: <0.3    Emergency Department Course:  Past medical records, nursing notes, and vitals reviewed.  1219: I performed an exam of the patient and obtained history, as documented above.    IV inserted and blood drawn.    The patient was sent for a US lower extremity venous duplex right while in the emergency department, findings above.    1440: I rechecked the patient.    1424: I rechecked the patient. Findings and plan explained to the Patient. Patient discharged home with instructions regarding supportive care, medications, and reasons to return. The importance of close follow-up was reviewed.     Impression & Plan      Medical Decision Making:  Promise Nunez is a 59 year old female who presents for evaluation of sensation of right lower leg coldness. On examination patient has a warm right and left lower extremity with normal capillary refill, no peripheral edema and 2+ DP pulses bilaterally. She has had recent travel with concern for DVT as a cause of her symptoms. Ultrasound was obtained and was negative for DVT but did show a right Baker's Cyst. She has no signs of infectious process, she is afebrile with a normal WBC and hemoglobin is normal today also at 14.0. She has no abnormalities in her BMP except  for a mild elevation in her glucose. D dimer is also negative today making DVT unlikely. No signs of arterial occlusion and her symptoms are not worsened with activity making arterial occlusive disease unlikely. She has normal sensation and no weakness and symptoms are not consistent with stroke. She does confirm right sided lower back pain with right radiculopathy through the right hip. She notes that symptoms have been waxing and waning and have been there for several years. I think this is likely the worsening cause of her symptoms. She was instructed on doing some stretches, ice and heat to the area and will take ibuprofen and tylenol.  She will follow up with primary care in 2-3 days with no improvement in her symptoms for further evaluation or possible physical therapy referral. No indication for further workup. She is discharged home in stable condition.     Diagnosis:    ICD-10-CM    1. Pain of right lower leg M79.661    2. Right-sided low back pain with right-sided sciatica M54.41      Disposition:  discharged to home  Fredi Sweeney  6/4/2019    EMERGENCY DEPARTMENT    Scribe Disclosure:  I, Fredi Sweeney, am serving as a scribe at 12:19 AM on 6/4/2019 to document services personally performed by Nikole Hogan CNP based on my observations and the provider's statements to me.          Nikole Hogan CNP  06/04/19 [id number]       Nikole Hogan CNP  06/04/19 [id number]

## 2019-06-04 NOTE — ED AVS SNAPSHOT
Emergency Department  [address] [place] [id number]  Phone:  [phone]  Fax:  [phone]                                    Promise Nunez   MRN: [id number]    Department:   Emergency Department   Date of Visit:  6/4/2019           After Visit Summary Signature Page    I have received my discharge instructions, and my questions have been answered. I have discussed any challenges I see with this plan with the nurse or doctor.    ..........................................................................................................................................  Patient/Patient Representative Signature      ..........................................................................................................................................  Patient Representative Print Name and Relationship to Patient    ..................................................               ................................................  Date                                   Time    ..........................................................................................................................................  Reviewed by Signature/Title    ...................................................              ..............................................  Date                                               Time          22EPIC Rev 08/18

## 2019-06-07 ENCOUNTER — OFFICE VISIT (OUTPATIENT)
Dept: FAMILY MEDICINE | Facility: CLINIC | Age: 59
End: 2019-06-07
Payer: COMMERCIAL

## 2019-06-07 ENCOUNTER — ANCILLARY PROCEDURE (OUTPATIENT)
Dept: GENERAL RADIOLOGY | Facility: CLINIC | Age: 59
End: 2019-06-07
Attending: INTERNAL MEDICINE
Payer: COMMERCIAL

## 2019-06-07 VITALS
HEART RATE: 80 BPM | DIASTOLIC BLOOD PRESSURE: 65 MMHG | TEMPERATURE: 97 F | WEIGHT: 127 LBS | SYSTOLIC BLOOD PRESSURE: 105 MMHG | HEIGHT: 62 IN | OXYGEN SATURATION: 100 % | BODY MASS INDEX: 23.37 KG/M2

## 2019-06-07 DIAGNOSIS — M71.21 BAKER CYST, RIGHT: ICD-10-CM

## 2019-06-07 DIAGNOSIS — M25.551 HIP PAIN, RIGHT: ICD-10-CM

## 2019-06-07 DIAGNOSIS — M25.561 ACUTE PAIN OF RIGHT KNEE: Primary | ICD-10-CM

## 2019-06-07 DIAGNOSIS — M25.561 ACUTE PAIN OF RIGHT KNEE: ICD-10-CM

## 2019-06-07 PROCEDURE — 73562 X-RAY EXAM OF KNEE 3: CPT | Mod: RT

## 2019-06-07 PROCEDURE — 73502 X-RAY EXAM HIP UNI 2-3 VIEWS: CPT

## 2019-06-07 PROCEDURE — 99214 OFFICE O/P EST MOD 30 MIN: CPT | Performed by: INTERNAL MEDICINE

## 2019-06-07 RX ORDER — MULTIPLE VITAMINS W/ MINERALS TAB 9MG-400MCG
1 TAB ORAL DAILY
COMMUNITY
End: 2023-02-24

## 2019-06-07 RX ORDER — LIDOCAINE 4 G/G
1 PATCH TOPICAL EVERY 24 HOURS
Qty: 30 PATCH | Refills: 3 | Status: SHIPPED | OUTPATIENT
Start: 2019-06-07 | End: 2020-06-25

## 2019-06-07 RX ORDER — LIDOCAINE 4 G/G
1 PATCH TOPICAL EVERY 24 HOURS
Qty: 30 PATCH | Refills: 3 | Status: SHIPPED | OUTPATIENT
Start: 2019-06-07 | End: 2019-06-07

## 2019-06-07 ASSESSMENT — MIFFLIN-ST. JEOR: SCORE: 1099.32

## 2019-06-07 NOTE — PROGRESS NOTES
Chief Complaint:       ED/UC Followup:    Facility:  [facility]  Date of visit: 06/04/2019  Reason for visit: Clinical Impressions   Pain of right lower leg   Right-sided hip pains  Baker's cyst    Current Status: right knee, hip pains symptoms still present       Knee pain    Duration:     Since: acute, following recent trip to [facility], [place]           Specific cause: arthritis of the knee    Description:      Location of pain: right knee     Character of pain: sharp     Pain radiation: none    Intensity: moderate    History:      Pain interferes with job: Yes     History of similar pain problems: Yes     Any previous MRI or X-rays: no     Therapies tried without relief: ibuprofen    Alleviating factors:      Improved by: rest    Precipitating factors:    Worsened by: walking long distance    Accompanying Signs & Symptoms: right hip pains            Current Medications:     Current Outpatient Medications   Medication Sig Dispense Refill     Cyanocobalamin 1000 MCG CAPS Take 1 capsule by mouth daily       Green Tea, Katelyn sinensis, (GREEN TEA EXTRACT PO) Take 800 mg by mouth daily        ibuprofen (ADVIL/MOTRIN) 200 MG tablet Take 200 mg by mouth every 4 hours as needed for mild pain       Lidocaine (LIDOCARE) 4 % Patch Place 1 patch onto the skin every 24 hours 30 patch 3     multivitamin w/minerals (MULTI-VITAMIN) tablet Take 1 tablet by mouth daily       valACYclovir (VALTREX) 500 MG tablet Take 500 mg by mouth daily as needed            Allergies:      Allergies   Allergen Reactions     No Known Allergies             Past Medical History:     Past Medical History:   Diagnosis Date     Abnormal echocardiogram     She had CT coronary angiography which was normal     GERD (gastroesophageal reflux disease)          Past Surgical History:     Past Surgical History:   Procedure Laterality Date     None           Family Medical History:     Family History   Problem Relation Age of Onset     Hypertension Mother       Diabetes Mother      Diabetes Father          Social History:     Social History     Socioeconomic History     Marital status:      Spouse name: Not on file     Number of children: Not on file     Years of education: Not on file     Highest education level: Not on file   Occupational History     Not on file   Social Needs     Financial resource strain: Not on file     Food insecurity:     Worry: Not on file     Inability: Not on file     Transportation needs:     Medical: Not on file     Non-medical: Not on file   Tobacco Use     Smoking status: Never Smoker     Smokeless tobacco: Never Used   Substance and Sexual Activity     Alcohol use: No     Alcohol/week: 0.0 oz     Drug use: No     Sexual activity: Yes     Partners: Male   Lifestyle     Physical activity:     Days per week: Not on file     Minutes per session: Not on file     Stress: Not on file   Relationships     Social connections:     Talks on phone: Not on file     Gets together: Not on file     Attends Oriental orthodox service: Not on file     Active member of club or organization: Not on file     Attends meetings of clubs or organizations: Not on file     Relationship status: Not on file     Intimate partner violence:     Fear of current or ex partner: Not on file     Emotionally abused: Not on file     Physically abused: Not on file     Forced sexual activity: Not on file   Other Topics Concern     Parent/sibling w/ CABG, MI or angioplasty before 65F 55M? Not Asked   Social History Narrative    ** Merged History Encounter **                Review of System:     Constitutional: Negative for fever or chills  Skin: Negative for rashes  Ears/Nose/Throat: Negative for nasal congestion, sore throat  Respiratory: No shortness of breath, dyspnea on exertion, cough, or hemoptysis  Cardiovascular: Negative for chest pain  Gastrointestinal: Negative for nausea, vomiting  Genitourinary: Negative for dysuria, hematuria  Musculoskeletal: positive for mechanical  "right hip and knee pains  Neurologic: Negative for headaches  Psychiatric: Negative for depression, anxiety  Hematologic/Lymphatic/Immunologic: Negative  Endocrine: Negative  Behavioral: Negative for tobacco use       Physical Exam:   /65 (BP Location: Left arm, Patient Position: Chair, Cuff Size: Adult Regular)   Pulse 80   Temp 97  F (36.1  C) (Oral)   Ht 1.575 m (5' 2\")   Wt 57.6 kg (127 lb)   SpO2 100%   BMI 23.23 kg/m      GENERAL: alert and no distress  EYES: eyes grossly normal to inspection, and conjunctivae and sclerae normal  HENT: Normocephalic atraumatic. Nose and mouth without ulcers or lesions  NECK: supple  RESP: lungs clear to auscultation   CV: regular rate and rhythm, normal S1 S2  LYMPH: no peripheral edema   ABDOMEN: nondistended  MS: right knee and hip pains noted  SKIN: no suspicious lesions or rashes  NEURO: Alert & Oriented x 3.   PSYCH: mentation appears normal, affect normal        Diagnostic Test Results:     Diagnostic Test Results:  Results for orders placed or performed during the hospital encounter of 06/04/19   US Lower Extremity Venous Duplex Right    Narrative    ULTRASOUND RIGHT LOWER EXTREMITY VENOUS DUPLEX June 4, 2019 1:21 PM     HISTORY: Right lower leg pain after trip to [facility].     FINDINGS: The deep veins in the right lower extremity are compressible  throughout. The deep veins demonstrate normal venous augmentation,  waveforms and color Doppler flow. No evidence of superficial  thrombophlebitis. Adame's cyst is present in the popliteal fossa  measuring 3.9 x 2.6 x 1.1 cm.      Impression    IMPRESSION:   1. No evidence of right lower extremity deep vein thrombosis or  superficial thrombophlebitis.  2. Right popliteal Adame's cyst.    CHAPIS SANCHEZ MD   CBC with platelets + differential   Result Value Ref Range    WBC 5.0 4.0 - 11.0 10e9/L    RBC Count 4.64 3.8 - 5.2 10e12/L    Hemoglobin 14.0 11.7 - 15.7 g/dL    Hematocrit 40.9 35.0 - 47.0 %    MCV 88 78 - 100 fl "    MCH 30.2 26.5 - 33.0 pg    MCHC 34.2 31.5 - 36.5 g/dL    RDW 13.5 10.0 - 15.0 %    Platelet Count 215 150 - 450 10e9/L    Diff Method Automated Method     % Neutrophils 38.7 %    % Lymphocytes 51.3 %    % Monocytes 8.6 %    % Eosinophils 0.8 %    % Basophils 0.4 %    % Immature Granulocytes 0.2 %    Nucleated RBCs 0 0 /100    Absolute Neutrophil 1.9 1.6 - 8.3 10e9/L    Absolute Lymphocytes 2.6 0.8 - 5.3 10e9/L    Absolute Monocytes 0.4 0.0 - 1.3 10e9/L    Absolute Eosinophils 0.0 0.0 - 0.7 10e9/L    Absolute Basophils 0.0 0.0 - 0.2 10e9/L    Abs Immature Granulocytes 0.0 0 - 0.4 10e9/L    Absolute Nucleated RBC 0.0    Basic metabolic panel   Result Value Ref Range    Sodium 143 133 - 144 mmol/L    Potassium 4.0 3.4 - 5.3 mmol/L    Chloride 110 (H) 94 - 109 mmol/L    Carbon Dioxide 26 20 - 32 mmol/L    Anion Gap 7 3 - 14 mmol/L    Glucose 148 (H) 70 - 99 mg/dL    Urea Nitrogen 15 7 - 30 mg/dL    Creatinine 0.62 0.52 - 1.04 mg/dL    GFR Estimate >90 >60 mL/min/[1.73_m2]    GFR Estimate If Black >90 >60 mL/min/[1.73_m2]    Calcium 8.6 8.5 - 10.1 mg/dL   D dimer quantitative   Result Value Ref Range    D Dimer <0.3 0.0 - 0.50 ug/ml FEU       ASSESSMENT/PLAN:   (M25.551) Hip pain, right  (M71.21) Baker cyst, right  (M25.561) Acute pain of right knee  (primary encounter diagnosis)  Comment: post ER discharge follow up of recent right hip and knee pains, US shows no DVT. There is a Baker's cyst in the right knee joint. Pain symptoms still present.  Plan: XR Knee Right 3 Views, ORTHOPEDICS ADULT         REFERRAL, Lidocaine (LIDOCARE) 4 % Patch,         XR Hip Right 2-3 Views    Follow Up Plan:     Patient is instructed to return to Internal Medicine clinic for follow-up visit in 1 week.        Roxanna Jones MD  Internal Medicine  [facility]

## 2020-03-02 ENCOUNTER — HEALTH MAINTENANCE LETTER (OUTPATIENT)
Age: 60
End: 2020-03-02

## 2020-06-13 ENCOUNTER — E-VISIT (OUTPATIENT)
Dept: FAMILY MEDICINE | Facility: CLINIC | Age: 60
End: 2020-06-13

## 2020-06-13 ENCOUNTER — NURSE TRIAGE (OUTPATIENT)
Dept: NURSING | Facility: CLINIC | Age: 60
End: 2020-06-13

## 2020-06-13 ENCOUNTER — VIRTUAL VISIT (OUTPATIENT)
Dept: URGENT CARE | Facility: CLINIC | Age: 60
End: 2020-06-13
Payer: COMMERCIAL

## 2020-06-13 DIAGNOSIS — Z53.9 ERRONEOUS ENCOUNTER--DISREGARD: Primary | ICD-10-CM

## 2020-06-13 DIAGNOSIS — R07.9 CHEST PAIN, UNSPECIFIED TYPE: Primary | ICD-10-CM

## 2020-06-13 PROCEDURE — 99215 OFFICE O/P EST HI 40 MIN: CPT | Mod: 95

## 2020-06-13 NOTE — PROGRESS NOTES
"Promise Nunez is a 60 year old female who is being evaluated via a billable telephone visit.      The patient has been notified of following:     \"This telephone visit will be conducted via a call between you and your physician/provider. We have found that certain health care needs can be provided without the need for a physical exam.  This service lets us provide the care you need with a short phone conversation.  If a prescription is necessary we can send it directly to your pharmacy.  If lab work is needed we can place an order for that and you can then stop by our lab to have the test done at a later time.    Telephone visits are billed at different rates depending on your insurance coverage. During this emergency period, for some insurers they may be billed the same as an in-person visit.  Please reach out to your insurance provider with any questions.    If during the course of the call the physician/provider feels a telephone visit is not appropriate, you will not be charged for this service.\"    Patient has given verbal consent for Telephone visit?  Yes    How would you like to obtain your AVS? Biankat    Juanpablo     Promise Nunez is a 60 year old female who presents via phone visit today for the following health issues:    HPI   Patient has been having recurrent chest pains for the last week. She describes her symptoms as chest pain or pressure that starts under the left breast and radiates to the middle of her chest. She has also had increased stress and crying as well as possibly some heartburn symptoms. She reports that she has tried different teas to try to treat her symptoms but has not had any improvement. She is unsure if the pain gets worse with exertion. She does not note worsening with deep breathing. She has noted some worsening with increased emotional distress. She denies any fever or chills, she denies any chin changes. She is somewhat difficult to understand over the phone due to her " accent so some history is limited due to that.     CHEST PAIN     Onset: 1 week off and on     Description:   Location:  left side  Character: heavy  Radiation: from left side to below the breast to the center of her chest   Duration: waxing and waning     Intensity: moderate    Progression of Symptoms:  same    Accompanying Signs & Symptoms:  Shortness of breath: no  Sweating: no  Nausea/vomiting: no  Lightheadedness: no   Palpitations: no  Fever/Chills: no  Cough: no  Heartburn: YES    History:   Family history of heart disease no  Tobacco use: no    Precipitating factors:   Worse with exertion: not sure   Worse with deep breaths :  no  Related to food: YES- gets a little worse     Alleviating factors:  Nothing        BP Readings from Last 3 Encounters:   06/07/19 105/65   06/04/19 (!) 123/95   12/27/18 126/66    Wt Readings from Last 3 Encounters:   06/07/19 57.6 kg (127 lb)   06/04/19 55.3 kg (122 lb)   12/28/18 53.5 kg (118 lb)                    Reviewed and updated as needed this visit by Provider         Review of Systems   Constitutional, HEENT, cardiovascular, pulmonary, GI, , musculoskeletal, neuro, skin, endocrine and psych systems are negative, except as otherwise noted.       Objective   Reported vitals:  There were no vitals taken for this visit.   alert and no distress  PSYCH: Alert and oriented times 3; coherent speech, normal   rate and volume, able to articulate logical thoughts, able   to abstract reason, no tangential thoughts, no hallucinations   or delusions  Her affect is anxious  RESP: No cough, no audible wheezing, able to talk in full sentences  Remainder of exam unable to be completed due to telephone visits    Diagnostic Test Results:  none         Assessment/Plan:  .  ASSESSMENT AND PLAN    ICD-10-CM    1. Chest pain, unspecified type  R07.9      After discussing symptoms and concerns with patient I advised that she go to the ER for cardiac evaluation. She is initially quite resistant  to this but does eventually agree to go get her heart checked out based on her symptoms.     No follow-ups on file.      Phone call duration:  18 minutes    Mayra Pollack PA-C

## 2020-06-13 NOTE — PATIENT INSTRUCTIONS
Due to left sided chest pain/pressure I would have you go to the ER for further evaluation of your heart. I cannot over the phone rule out heart causes of your symptoms.

## 2020-06-13 NOTE — TELEPHONE ENCOUNTER
She had an E visit set up and no one has gotten back to her. I connected with scheduling for them to set up a telephone visit with an urgent care provider based on what she entered into the document for the E visit, stress related.  Salina Infante RN  [facility]    Additional Information    Nursing judgment    Protocols used: NO PROTOCOL AVAILABLE - INFORMATION ONLY-A-[place]

## 2020-06-14 ENCOUNTER — HOSPITAL ENCOUNTER (EMERGENCY)
Facility: CLINIC | Age: 60
Discharge: HOME | End: 2020-06-14
Attending: PHYSICIAN ASSISTANT | Admitting: PHYSICIAN ASSISTANT
Payer: COMMERCIAL

## 2020-06-14 ENCOUNTER — APPOINTMENT (OUTPATIENT)
Dept: GENERAL RADIOLOGY | Facility: CLINIC | Age: 60
End: 2020-06-14
Attending: PHYSICIAN ASSISTANT
Payer: COMMERCIAL

## 2020-06-14 VITALS
RESPIRATION RATE: 20 BRPM | OXYGEN SATURATION: 98 % | DIASTOLIC BLOOD PRESSURE: 80 MMHG | TEMPERATURE: 98.8 F | SYSTOLIC BLOOD PRESSURE: 122 MMHG | HEART RATE: 86 BPM | BODY MASS INDEX: 21.95 KG/M2 | WEIGHT: 120 LBS

## 2020-06-14 DIAGNOSIS — R07.9 CHEST PAIN: ICD-10-CM

## 2020-06-14 LAB
ALBUMIN SERPL-MCNC: 3.9 G/DL (ref 3.4–5)
ALP SERPL-CCNC: 87 U/L (ref 40–150)
ALT SERPL W P-5'-P-CCNC: 54 U/L (ref 0–50)
ANION GAP SERPL CALCULATED.3IONS-SCNC: 7 MMOL/L (ref 3–14)
AST SERPL W P-5'-P-CCNC: 43 U/L (ref 0–45)
BASOPHILS # BLD AUTO: 0 10E9/L (ref 0–0.2)
BASOPHILS NFR BLD AUTO: 0.3 %
BILIRUB SERPL-MCNC: 0.6 MG/DL (ref 0.2–1.3)
BUN SERPL-MCNC: 17 MG/DL (ref 7–30)
CALCIUM SERPL-MCNC: 9.1 MG/DL (ref 8.5–10.1)
CHLORIDE SERPL-SCNC: 107 MMOL/L (ref 94–109)
CO2 SERPL-SCNC: 25 MMOL/L (ref 20–32)
CREAT SERPL-MCNC: 0.77 MG/DL (ref 0.52–1.04)
DIFFERENTIAL METHOD BLD: NORMAL
EOSINOPHIL # BLD AUTO: 0 10E9/L (ref 0–0.7)
EOSINOPHIL NFR BLD AUTO: 0.6 %
ERYTHROCYTE [DISTWIDTH] IN BLOOD BY AUTOMATED COUNT: 13.7 % (ref 10–15)
GFR SERPL CREATININE-BSD FRML MDRD: 84 ML/MIN/{1.73_M2}
GLUCOSE SERPL-MCNC: 125 MG/DL (ref 70–99)
HCT VFR BLD AUTO: 42.5 % (ref 35–47)
HGB BLD-MCNC: 14.4 G/DL (ref 11.7–15.7)
IMM GRANULOCYTES # BLD: 0 10E9/L (ref 0–0.4)
IMM GRANULOCYTES NFR BLD: 0.2 %
INTERPRETATION ECG - MUSE: NORMAL
LIPASE SERPL-CCNC: 231 U/L (ref 73–393)
LYMPHOCYTES # BLD AUTO: 2.7 10E9/L (ref 0.8–5.3)
LYMPHOCYTES NFR BLD AUTO: 42.3 %
MCH RBC QN AUTO: 30.2 PG (ref 26.5–33)
MCHC RBC AUTO-ENTMCNC: 33.9 G/DL (ref 31.5–36.5)
MCV RBC AUTO: 89 FL (ref 78–100)
MONOCYTES # BLD AUTO: 0.5 10E9/L (ref 0–1.3)
MONOCYTES NFR BLD AUTO: 7.6 %
NEUTROPHILS # BLD AUTO: 3.1 10E9/L (ref 1.6–8.3)
NEUTROPHILS NFR BLD AUTO: 49 %
NRBC # BLD AUTO: 0 10*3/UL
NRBC BLD AUTO-RTO: 0 /100
PLATELET # BLD AUTO: 243 10E9/L (ref 150–450)
POTASSIUM SERPL-SCNC: 4 MMOL/L (ref 3.4–5.3)
PROT SERPL-MCNC: 7.7 G/DL (ref 6.8–8.8)
RBC # BLD AUTO: 4.77 10E12/L (ref 3.8–5.2)
SODIUM SERPL-SCNC: 139 MMOL/L (ref 133–144)
TROPONIN I SERPL-MCNC: <0.015 UG/L (ref 0–0.04)
WBC # BLD AUTO: 6.3 10E9/L (ref 4–11)

## 2020-06-14 PROCEDURE — 83690 ASSAY OF LIPASE: CPT | Performed by: PHYSICIAN ASSISTANT

## 2020-06-14 PROCEDURE — 84484 ASSAY OF TROPONIN QUANT: CPT | Performed by: PHYSICIAN ASSISTANT

## 2020-06-14 PROCEDURE — 71046 X-RAY EXAM CHEST 2 VIEWS: CPT

## 2020-06-14 PROCEDURE — 85025 COMPLETE CBC W/AUTO DIFF WBC: CPT | Performed by: PHYSICIAN ASSISTANT

## 2020-06-14 PROCEDURE — 80053 COMPREHEN METABOLIC PANEL: CPT | Performed by: PHYSICIAN ASSISTANT

## 2020-06-14 PROCEDURE — 93005 ELECTROCARDIOGRAM TRACING: CPT

## 2020-06-14 PROCEDURE — 99285 EMERGENCY DEPT VISIT HI MDM: CPT | Mod: 25

## 2020-06-14 RX ORDER — SUCRALFATE ORAL 1 G/10ML
1 SUSPENSION ORAL 4 TIMES DAILY PRN
Qty: 420 ML | Refills: 0 | Status: SHIPPED | OUTPATIENT
Start: 2020-06-14 | End: 2021-04-12

## 2020-06-14 ASSESSMENT — ENCOUNTER SYMPTOMS
SHORTNESS OF BREATH: 0
NAUSEA: 0
NERVOUS/ANXIOUS: 1
DYSPHORIC MOOD: 1
APPETITE CHANGE: 1
FEVER: 0
COUGH: 0

## 2020-06-14 NOTE — DISCHARGE INSTRUCTIONS
Discharge Instructions  Chest Pain    You have been seen today for chest pain or discomfort.  At this time, your provider has found no signs that your chest pain is due to a serious or life-threatening condition, (or you have declined more testing and/or admission to the hospital). However, sometimes there is a serious problem that does not show up right away. Your evaluation today may not be complete and you may need further testing and evaluation.     Generally, every Emergency Department visit should have a follow-up clinic visit with either a primary or a specialty clinic/provider. Please follow-up as instructed by your emergency provider today.  Return to the Emergency Department if:  Your chest pain changes, gets worse, starts to happen more often, or comes with less activity.  You are newly short of breath.  You get very weak or tired.  You pass out or faint.  You have any new symptoms, like fever, cough, numb legs, or you cough up blood.  You have anything else that worries you.    Until you follow-up with your regular provider, please do the following:  Take one aspirin daily unless you have an allergy or are told not to by your provider.  If a stress test appointment has been made, go to the appointment.  If you have questions, contact your regular provider.  Follow-up with your regular provider/clinic as directed; this is very important.    If you were given a prescription for medicine here today, be sure to read all of the information (including the package insert) that comes with your prescription.  This will include important information about the medicine, its side effects, and any warnings that you need to know about.  The pharmacist who fills the prescription can provide more information and answer questions you may have about the medicine.  If you have questions or concerns that the pharmacist cannot address, please call or return to the Emergency Department.       Remember that you can always come  back to the Emergency Department if you are not able to see your regular provider in the amount of time listed above, if you get any new symptoms, or if there is anything that worries you.  Discharge Instructions  Mental Health Concerns    You were seen today for mental health concerns, such as depression, anxiety, or suicidal thinking. Your provider feels that you do not require hospitalization at this time. However, your symptoms may become worse, and you may need to return to the Emergency Department. Most treatments of depression and suicidal thoughts are a process rather than a single intervention.  Medications and counseling can take several weeks or more to help.    Generally, every Emergency Department visit should have a follow-up clinic visit with either a primary or a specialty clinic/provider. Please follow-up as instructed by your emergency provider today.    By accepting these discharge instructions:  You promise to not harm yourself or others.  You agree that if you feel you are becoming unable to keep that promise, you will do something to help yourself before you do anything to harm yourself or others.   You agree to keep any safety plan arranged on your visit here today.  You agree to take any medication prescribed or recommended by your provider.  If you are getting worse, you can contact a friend or a family member, contact your counselor or family provider, contact a crisis line, or other options discussed with the provider or therapist today.  At any time, you can call 911 and return to the Emergency Department for more help.  You understand that follow-up is essential to your treatment, and you will make and keep appointments recommended on your visit today.    How to improve your mental health and prevent suicide:  Involve others by letting family, friends, counselors know.  Do not isolate yourself.  Avoid alcohol or drugs. Remove weapons, poisons from your home.  Try to stick to routines for  eating, sleeping and getting regular exercise.    Try to get into sunlight. Bright natural light not only treats seasonal affective disorder but also depression.  Increase safe activities that you enjoy.    If you feel worse, contact [phone] ([phone]), or call 911, or your primary provider/counselor for additional assistance.    If you were given a prescription for medicine here today, be sure to read all of the information (including the package insert) that comes with your prescription.  This will include important information about the medicine, its side effects, and any warnings that you need to know about.  The pharmacist who fills the prescription can provide more information and answer questions you may have about the medicine.  If you have questions or concerns that the pharmacist cannot address, please call or return to the Emergency Department.   Remember that you can always come back to the Emergency Department if you are not able to see your regular provider in the amount of time listed above, if you get any new symptoms, or if there is anything that worries you.

## 2020-06-14 NOTE — ED PROVIDER NOTES
"  History     Chief Complaint:  Chest Pain    HPI   Promise Nunez is a 60 year old female with a history of ischemic cardiomyopathy who presents to the emergency department for evaluation of chest pain. The patient reports that she has had chest pain since April. She describes her pain as more of a discomfort like she has \"a lot of air in her chest.\" She does note that her pain is worse after eating and is mainly in her central or left chest. The patient states that she did take 14 days of omeprazole and that this improved her pain, but it returned when her course finished. She states that around this time she began to feel very anxious and has not been eating well, which she thinks has contributed to her pain. She also notes feeling depressed for the last week, which has never happened to her before. The patient had a virtual clinical visit yesterday and was told to present to the emergency department if her pain persisted. The patient has currently been having constant mild pain for the last 8 hours. She denies any shortness of breath, nausea, diaphoresis, leg swelling, leg pain, cough or fever. Pain does not radiate.  She has never seen a therapist for her depression and denies any suicidal ideation or thoughts of harming herself.    Allergies:  No Known Drug Allergies    Medications:    Cyanocobalamin  Valtrex     Past Medical History:    Small bowel obstruction  Abnormal echocardiogram  Kidney stone  Gastroesophageal reflux disease   Ischemic cardiomyoapthy    Past Surgical History:    The patient does not have any pertinent past surgical history.    Family History:    Mother: hypertension, diabetes  Father: diabetes     Social History:  The patient presents alone.  Smoking Status: Never  Smokeless Tobacco: Never  Alcohol Use: No  Drug Use: No  Marital Status:        Review of Systems   Constitutional: Positive for appetite change. Negative for fever.   Respiratory: Negative for cough and shortness of " breath.    Cardiovascular: Positive for chest pain. Negative for leg swelling.   Gastrointestinal: Negative for nausea.   Psychiatric/Behavioral: Positive for dysphoric mood. Negative for suicidal ideas. The patient is nervous/anxious.    All other systems reviewed and are negative.    Physical Exam   Vitals:  Patient Vitals for the past 24 hrs:   BP Temp Temp src Pulse Heart Rate Resp SpO2 Weight   06/14/20 1415 128/81 98.8  F (37.1  C) Oral 102 102 18 97 % 54.4 kg (120 lb)       Physical Exam  General: Alert and cooperative with exam. Resting comfortably on gurney  Head:  Scalp is NC/AT  Eyes:  No scleral icterus, PERRL  ENT:  The external nose and ears are normal.   Neck:  Normal range of motion without rigidity.  CV:  Regular rate and rhythm    No pathologic murmur, rubs, or gallops.  Resp:  Breath sounds are clear bilaterally.  No crackles, wheezes, rhonchi, stridor.    Non-labored, no retractions or accessory muscle use  GI:  Abdomen is soft, no distension, no tenderness, no masses. No peritoneal signs.  Bowel sounds present in all quadrants  :  No suprapubic or flank tenderness  MS:  No lower extremity edema or asymmetric calf swelling. Normal ROM in all joints without effusions.    No midline cervical, thoracic, or lumbar tenderness  Skin:  Warm and dry, No rash or lesions noted. 2+ peripheral pulses in all extremities  Neuro:  Oriented. No gross motor deficits. GCS 15  Psych: Awake. Alert. Normal affect. Appropriate interactions.    Emergency Department Course   ECG:  Indication: chest pain  Completed at 1412.  Read at 1417 by Dr. Michael.   Rate 97 bpm. ND interval 140. QRS duration 66. QT/QTc 314/398. P-R-T axes 34 60 55.  Normal sinus rhythm    Imaging:  Radiographic findings were communicated with the patient who voiced understanding of the findings.    XR Chest PA & LAT   The cardiac silhouette and pulmonary vasculature are  within normal limits. No focal pulmonary consolidations. No  pleural  effusion or pneumothorax.  Reading per radiology.    Laboratory:  CBC:  WNL. (WBC 6.3, HGB 14.4, )   CMP: Glucose 125 (H) o/w AWNL (Creatinine 0.77)    Troponin (Collected 1429): <0.015  Lipase: 231     Emergency Department Course:  Past medical records, nursing notes, and vitals reviewed.    1425 I performed an exam of the patient as documented above.     EKG obtained in the ED, see results above.   IV was inserted and blood was drawn for laboratory testing, results above.  The patient was sent for a chest XR while in the emergency department, results above.     1550 I rechecked the patient and discussed the results of her workup thus far.     Findings and plan explained to the Patient. Patient discharged home with instructions regarding supportive care, medications, and reasons to return. The importance of close follow-up was reviewed. The patient was prescribed Prilosec and Carafate.     I personally reviewed the laboratory results with the Patient and answered all related questions prior to discharge.    Impression & Plan      Medical Decision Makin-year-old female presents with chest pain.  History and records reviewed.  Broad differential is considered.  EKG normal with no ischemic changes or arrhythmia.  Troponin is undetectable despite ongoing symptoms for greater than 6 hours.  The patient is a heart score of 3 (H0E0 A1 R2 T0).  I doubt ACS at this time.  She has no tachycardia, normal O2 sats, no risk factors for pulmonary embolism and is low risk by Wells criteria and would not pursue this further.  Chest x-ray demonstrates no pneumonia, pneumothorax, or mediastinal widening and there are no high risk factors to raise concern for dissection.  She has a benign abdominal examination with no focal tenderness to palpation to suggest referred pain.    I feel GERD is most likely given the patient's symptoms associated with food and relief with omeprazole over the last several weeks.  Plan  will be resuming omeprazole and Carafate with follow-up with PCP and plan for endoscopy if not improving.  She also requested resources for depression which were provided.  Denies any active suicidal ideation.  She will return if she has any feelings of not be able to keeping herself safe, or new or worsening chest pain, shortness of breath, etc.    Diagnosis:    ICD-10-CM    1. Chest pain  R07.9        Disposition:  discharged to home    Discharge Medications:  New Prescriptions    OMEPRAZOLE (PRILOSEC) 20 MG DR CAPSULE    Take 1 capsule (20 mg) by mouth daily for 28 days    SUCRALFATE (CARAFATE) 1 GM/10ML SUSPENSION    Take 10 mLs (1 g) by mouth 4 times daily as needed (Pain)     I, Junito Pabon, am serving as a scribe on 6/14/2020 at 2:06 PM to personally document services performed by Davonte Cross PA-C based on my observations and the provider's statements to me.   Junito Pabon  6/14/2020    EMERGENCY DEPARTMENT       Davonte Cross PA-C  06/15/20 1115

## 2020-06-14 NOTE — ED AVS SNAPSHOT
Emergency Department  [address] [facility] [address]  Phone:  [phone]  Fax:  [phone]                                    Promise Nunez   MRN: [id number]    Department:   Emergency Department   Date of Visit:  6/14/2020           After Visit Summary Signature Page    I have received my discharge instructions, and my questions have been answered. I have discussed any challenges I see with this plan with the nurse or doctor.    ..........................................................................................................................................  Patient/Patient Representative Signature      ..........................................................................................................................................  Patient Representative Print Name and Relationship to Patient    ..................................................               ................................................  Date                                   Time    ..........................................................................................................................................  Reviewed by Signature/Title    ...................................................              ..............................................  Date                                               Time          22EPIC Rev 08/18

## 2020-06-16 ENCOUNTER — NURSE TRIAGE (OUTPATIENT)
Dept: NURSING | Facility: CLINIC | Age: 60
End: 2020-06-16

## 2020-06-16 ENCOUNTER — VIRTUAL VISIT (OUTPATIENT)
Dept: FAMILY MEDICINE | Facility: CLINIC | Age: 60
End: 2020-06-16
Payer: COMMERCIAL

## 2020-06-16 DIAGNOSIS — Z79.899 ENCOUNTER FOR MEDICATION REVIEW: Primary | ICD-10-CM

## 2020-06-16 PROCEDURE — 99213 OFFICE O/P EST LOW 20 MIN: CPT | Mod: 95 | Performed by: NURSE PRACTITIONER

## 2020-06-16 NOTE — TELEPHONE ENCOUNTER
"Patient calls with questions about her recent ED visit on 6/14/20.   She was reviewing the AVS, page 8,  and it states \"You were seen today for mental health concerns, such as depression, anxiety, or suicidal thinking.\"  She states she was not having any thoughts of suicide and wants that removed from her chart. She does admit that she was having sadness.    Tried to explain that statement is a general statement and that it did not say she was having suicidal thoughts. Also read the ED note to her that states \"She also requested resources for depression which were provided.  Denies any active suicidal ideation.  She will return if she has any feelings of not be able to keeping herself safe, or new or worsening chest pain, shortness of breath, etc.\"    Asked that she contact medical records to help them assist her with how this can be done. Number provided.     Patient also has questions about the medication that was prescribed.   She has Omeprazole Sodium Bicarbonate 20mg at home she has been taking and did not fill the prescription given to her from the ED. She wants to know if it is the same as omeprazole/prilosec. Informed it most likely is the same, but recommended she check with the pharmacist for clarification.     She also states she filled the prescription for Sucralfate liquid, but the pharmacist told her not to take it until she clarified the directions with the doctor because it was written to take as needed for pain. She states the pharmacist said she had not seen it prescribed for pain before and wanted it clarified.   Patient wants to know when she is supposed to take it.   Patient did not get a chance to call yesterday about it and has not taken the medication.   She is taking the OTC Omeprazole 20mg she has at home and symptoms are a little better.     Patient has not scheduled a follow up appointment after ED visit with PCP yet. She does not have a PCP but prefers a female. Recommended she schedule " a virtual visit today with her clinic to get medications clarified. Patient in agreement. Transferred to scheduling.     AUDRA Khan RN

## 2020-06-16 NOTE — TELEPHONE ENCOUNTER
Additional Information    Caller has urgent medication question about med that PCP prescribed and triager unable to answer question    Protocols used: MEDICATION QUESTION CALL-A-OH

## 2020-06-16 NOTE — PROGRESS NOTES
"Promise Nunez is a 60 year old female who is being evaluated via a billable telephone visit.      The patient has been notified of following:     \"This telephone visit will be conducted via a call between you and your physician/provider. We have found that certain health care needs can be provided without the need for a physical exam.  This service lets us provide the care you need with a short phone conversation.  If a prescription is necessary we can send it directly to your pharmacy.  If lab work is needed we can place an order for that and you can then stop by our lab to have the test done at a later time.    Telephone visits are billed at different rates depending on your insurance coverage. During this emergency period, for some insurers they may be billed the same as an in-person visit.  Please reach out to your insurance provider with any questions.    If during the course of the call the physician/provider feels a telephone visit is not appropriate, you will not be charged for this service.\"    Patient has given verbal consent for Telephone visit?  Yes    What phone number would you like to be contacted at? [phone]    How would you like to obtain your AVS? Biankat    Subjective     Promise Nunez is a 60 year old female who presents via phone visit today for the following health issues:    HPI  Pt has questions about her omeprazole vs prilosec.   Also wondering about how to take carafate   Would also like her pt instructions changed from the ED.     Patient Active Problem List   Diagnosis     CARDIOVASCULAR SCREENING; LDL GOAL LESS THAN 160     History of renal stone     Shoulder pain     Greater tuberosity of humerus fracture     Small bowel obstruction (H)     Past Surgical History:   Procedure Laterality Date     None         Social History     Tobacco Use     Smoking status: Never Smoker     Smokeless tobacco: Never Used   Substance Use Topics     Alcohol use: No     Alcohol/week: 0.0 standard " drinks     Family History   Problem Relation Age of Onset     Hypertension Mother      Diabetes Mother      Diabetes Father            Reviewed and updated as needed this visit by Provider         Review of Systems   Detailed as above        Objective   Reported vitals:  There were no vitals taken for this visit.     PSYCH: Alert and oriented times 3; coherent speech, normal   rate and volume, able to articulate logical thoughts, able   to abstract reason, no tangential thoughts, no hallucinations   or delusions  Her affect is normal  RESP: No cough, no audible wheezing, able to talk in full sentences  Remainder of exam unable to be completed due to telephone visits      Assessment/Plan:  1. Encounter for medication review    Answered her questions about prilosec and carafate   She also wants her records changed from the ED. She does not want mention of suicide in her chart as she has never had those thoughts. The generic pt discharge instructions talk about suicide and I told her that her note does not mention suicide but she still wants that changed. She has tried calling medical records so she will try again.       Phone call duration:  24 minutes    ARNOLDO Knowles CNP

## 2020-06-25 ENCOUNTER — VIRTUAL VISIT (OUTPATIENT)
Dept: FAMILY MEDICINE | Facility: CLINIC | Age: 60
End: 2020-06-25
Payer: COMMERCIAL

## 2020-06-25 DIAGNOSIS — F51.01 PRIMARY INSOMNIA: Primary | ICD-10-CM

## 2020-06-25 PROCEDURE — 99213 OFFICE O/P EST LOW 20 MIN: CPT | Mod: 95 | Performed by: NURSE PRACTITIONER

## 2020-06-25 ASSESSMENT — ANXIETY QUESTIONNAIRES
GAD7 TOTAL SCORE: 3
1. FEELING NERVOUS, ANXIOUS, OR ON EDGE: NOT AT ALL
2. NOT BEING ABLE TO STOP OR CONTROL WORRYING: NOT AT ALL
IF YOU CHECKED OFF ANY PROBLEMS ON THIS QUESTIONNAIRE, HOW DIFFICULT HAVE THESE PROBLEMS MADE IT FOR YOU TO DO YOUR WORK, TAKE CARE OF THINGS AT HOME, OR GET ALONG WITH OTHER PEOPLE: NOT DIFFICULT AT ALL
7. FEELING AFRAID AS IF SOMETHING AWFUL MIGHT HAPPEN: NOT AT ALL
6. BECOMING EASILY ANNOYED OR IRRITABLE: NOT AT ALL
5. BEING SO RESTLESS THAT IT IS HARD TO SIT STILL: NOT AT ALL
3. WORRYING TOO MUCH ABOUT DIFFERENT THINGS: NEARLY EVERY DAY

## 2020-06-25 ASSESSMENT — PATIENT HEALTH QUESTIONNAIRE - PHQ9
SUM OF ALL RESPONSES TO PHQ QUESTIONS 1-9: 12
5. POOR APPETITE OR OVEREATING: NOT AT ALL

## 2020-06-25 NOTE — PROGRESS NOTES
"Promise Nunez is a 60 year old female who is being evaluated via a billable telephone visit.      The patient has been notified of following:     \"This telephone visit will be conducted via a call between you and your physician/provider. We have found that certain health care needs can be provided without the need for a physical exam.  This service lets us provide the care you need with a short phone conversation.  If a prescription is necessary we can send it directly to your pharmacy.  If lab work is needed we can place an order for that and you can then stop by our lab to have the test done at a later time.    Telephone visits are billed at different rates depending on your insurance coverage. During this emergency period, for some insurers they may be billed the same as an in-person visit.  Please reach out to your insurance provider with any questions.    If during the course of the call the physician/provider feels a telephone visit is not appropriate, you will not be charged for this service.\"    Patient has given verbal consent for Telephone visit?  Yes    What phone number would you like to be contacted at? [phone]    How would you like to obtain your AVS? Pradeep Geronimo     Promise Nunez is a 60 year old female who presents via phone visit today for the following health issues:    HPI  Insomnia, DEPRESSION and ANXIETY  x2 weeks  Worrying and unable to sleep  She says that she has some family concerns but offers no detail  Feels well, no fever or chills or respiratory concerns  Was in ED a couple of weeks ago, negative cardiac eval  Treated with carafate and prilosec and doing better  For sleep she drinks herbal tea at bedtime  Has not tried any OTC yet       Patient Active Problem List   Diagnosis     CARDIOVASCULAR SCREENING; LDL GOAL LESS THAN 160     History of renal stone     Shoulder pain     Greater tuberosity of humerus fracture     Small bowel obstruction (H)     Past Surgical History: "   Procedure Laterality Date     None         Social History     Tobacco Use     Smoking status: Never Smoker     Smokeless tobacco: Never Used   Substance Use Topics     Alcohol use: No     Alcohol/week: 0.0 standard drinks     Family History   Problem Relation Age of Onset     Hypertension Mother      Diabetes Mother      Diabetes Father          Current Outpatient Medications   Medication Sig Dispense Refill     Green Tea, Katelyn sinensis, (GREEN TEA EXTRACT PO) Take 800 mg by mouth daily        multivitamin w/minerals (MULTI-VITAMIN) tablet Take 1 tablet by mouth daily       valACYclovir (VALTREX) 500 MG tablet Take 500 mg by mouth daily as needed        diphenhydrAMINE-acetaminophen (TYLENOL PM)  MG tablet Take 1 tablet by mouth nightly as needed for sleep       omeprazole (PRILOSEC) 20 MG DR capsule Take 1 capsule (20 mg) by mouth daily for 28 days 28 capsule 0     sucralfate (CARAFATE) 1 GM/10ML suspension Take 10 mLs (1 g) by mouth 4 times daily as needed (Pain) 420 mL 0     Allergies   Allergen Reactions     No Known Allergies        Reviewed and updated as needed this visit by Provider         Review of Systems   Constitutional, HEENT, cardiovascular, pulmonary, gi and gu systems are negative, except as otherwise noted.       Objective   Reported vitals:  There were no vitals taken for this visit.   healthy, alert and no distress  PSYCH: Alert and oriented times 3; coherent speech, normal   rate and volume, able to articulate logical thoughts, able   to abstract reason, no tangential thoughts, no hallucinations   or delusions  Her affect is normal  RESP: No cough, no audible wheezing, able to talk in full sentences  Remainder of exam unable to be completed due to telephone visits  Psych:  YADY 7  3/21    PHQ 9 15/27   Affect normal , no SI    Diagnostic Test Results:  Labs reviewed in Epic        Assessment/Plan:    (F51.01) Primary insomnia  (primary encounter diagnosis)  Comment: this is a new  concern for Promise- only 2 week duration  Plan: I have asked her to discontinue all fluid intake 2 hours before bed  And to begin taking sominex at bedtime  She will trial for 2 weeks and call for any additional concerns     Phone call duration:  22 minutes    ARNOLDO Stapleton CNP

## 2020-06-26 ASSESSMENT — ANXIETY QUESTIONNAIRES: GAD7 TOTAL SCORE: 3

## 2020-12-14 ENCOUNTER — HEALTH MAINTENANCE LETTER (OUTPATIENT)
Age: 60
End: 2020-12-14

## 2021-03-04 ENCOUNTER — TELEPHONE (OUTPATIENT)
Dept: FAMILY MEDICINE | Facility: CLINIC | Age: 61
End: 2021-03-04

## 2021-03-04 NOTE — LETTER
[address] [facility] [id number] [phone]        March 4, 2021  Promise Nunez  [address]   [facility] [address]    Nicole Virgen,    In review of Health Maintenance our records show you are due for a mammogram, pap smear and colon cancer screening or we need to track down some reports!     If you have had any of the above tests elsewhere could you make sure we get a copy for our files please?  Our fax # here at the [facility] is [phone].       If you are due for a mammogram you are always welcome to call the [facility] [phone] located here in our building [address] [place] [address] S. [address] to schedule.    If you are due for a pap smear and do not have a GYN, Dr. Jones refers patients around the corner from us to  [facility]   [address]   [facility] [address]     (ph) [phone]           If you are in need of a Colonoscopy just let us know and we can place a referral for you.  If you do not have a family history of colon cancer or colon polyps and would prefer the FIT take-home test, just stop by our lab to  a kit.      Sincerely,     Shannan HO MA  on behalf of Dr. Jones

## 2021-03-04 NOTE — TELEPHONE ENCOUNTER
Patient Quality Outreach      Summary:    Patient has the following on her problem list/HM: None    Patient is due/failing the following:   FIT, Breast Cancer Screening - Mammogram and Cervical Cancer Screening - PAP Needed    Type of outreach:    Sent letter.    Questions for provider review:    None                                                                                                                                     Shannan HO MA      Chart routed to no one.

## 2021-04-12 ENCOUNTER — HOSPITAL ENCOUNTER (OUTPATIENT)
Dept: MAMMOGRAPHY | Facility: CLINIC | Age: 61
Discharge: HOME | End: 2021-04-12
Attending: INTERNAL MEDICINE | Admitting: INTERNAL MEDICINE
Payer: COMMERCIAL

## 2021-04-12 ENCOUNTER — OFFICE VISIT (OUTPATIENT)
Dept: FAMILY MEDICINE | Facility: CLINIC | Age: 61
End: 2021-04-12
Payer: COMMERCIAL

## 2021-04-12 VITALS
TEMPERATURE: 97.8 F | SYSTOLIC BLOOD PRESSURE: 110 MMHG | HEART RATE: 77 BPM | WEIGHT: 127.9 LBS | DIASTOLIC BLOOD PRESSURE: 72 MMHG | HEIGHT: 62 IN | BODY MASS INDEX: 23.53 KG/M2 | OXYGEN SATURATION: 98 %

## 2021-04-12 DIAGNOSIS — Z12.31 VISIT FOR SCREENING MAMMOGRAM: ICD-10-CM

## 2021-04-12 DIAGNOSIS — M25.551 HIP PAIN, RIGHT: ICD-10-CM

## 2021-04-12 DIAGNOSIS — Z00.00 ROUTINE HISTORY AND PHYSICAL EXAMINATION OF ADULT: Primary | ICD-10-CM

## 2021-04-12 LAB
ANION GAP SERPL CALCULATED.3IONS-SCNC: 3 MMOL/L (ref 3–14)
BASOPHILS # BLD AUTO: 0 10E9/L (ref 0–0.2)
BASOPHILS NFR BLD AUTO: 0.6 %
BUN SERPL-MCNC: 16 MG/DL (ref 7–30)
CALCIUM SERPL-MCNC: 8.8 MG/DL (ref 8.5–10.1)
CHLORIDE SERPL-SCNC: 108 MMOL/L (ref 94–109)
CHOLEST SERPL-MCNC: 192 MG/DL
CO2 SERPL-SCNC: 28 MMOL/L (ref 20–32)
CREAT SERPL-MCNC: 0.64 MG/DL (ref 0.52–1.04)
DIFFERENTIAL METHOD BLD: NORMAL
EOSINOPHIL # BLD AUTO: 0.1 10E9/L (ref 0–0.7)
EOSINOPHIL NFR BLD AUTO: 1.2 %
ERYTHROCYTE [DISTWIDTH] IN BLOOD BY AUTOMATED COUNT: 14 % (ref 10–15)
GFR SERPL CREATININE-BSD FRML MDRD: >90 ML/MIN/{1.73_M2}
GLUCOSE SERPL-MCNC: 80 MG/DL (ref 70–99)
HBA1C MFR BLD: 5.9 % (ref 0–5.6)
HCT VFR BLD AUTO: 42.4 % (ref 35–47)
HDLC SERPL-MCNC: 74 MG/DL
HGB BLD-MCNC: 14.4 G/DL (ref 11.7–15.7)
LDLC SERPL CALC-MCNC: 91 MG/DL
LYMPHOCYTES # BLD AUTO: 2.3 10E9/L (ref 0.8–5.3)
LYMPHOCYTES NFR BLD AUTO: 45.4 %
MCH RBC QN AUTO: 30.3 PG (ref 26.5–33)
MCHC RBC AUTO-ENTMCNC: 34 G/DL (ref 31.5–36.5)
MCV RBC AUTO: 89 FL (ref 78–100)
MONOCYTES # BLD AUTO: 0.4 10E9/L (ref 0–1.3)
MONOCYTES NFR BLD AUTO: 8.1 %
NEUTROPHILS # BLD AUTO: 2.3 10E9/L (ref 1.6–8.3)
NEUTROPHILS NFR BLD AUTO: 44.7 %
NONHDLC SERPL-MCNC: 118 MG/DL
PLATELET # BLD AUTO: 204 10E9/L (ref 150–450)
POTASSIUM SERPL-SCNC: 4.5 MMOL/L (ref 3.4–5.3)
RBC # BLD AUTO: 4.75 10E12/L (ref 3.8–5.2)
SODIUM SERPL-SCNC: 139 MMOL/L (ref 133–144)
TRIGL SERPL-MCNC: 133 MG/DL
TSH SERPL DL<=0.005 MIU/L-ACNC: 0.78 MU/L (ref 0.4–4)
WBC # BLD AUTO: 5.1 10E9/L (ref 4–11)

## 2021-04-12 PROCEDURE — 83036 HEMOGLOBIN GLYCOSYLATED A1C: CPT | Performed by: INTERNAL MEDICINE

## 2021-04-12 PROCEDURE — 82306 VITAMIN D 25 HYDROXY: CPT | Performed by: INTERNAL MEDICINE

## 2021-04-12 PROCEDURE — 84443 ASSAY THYROID STIM HORMONE: CPT | Performed by: INTERNAL MEDICINE

## 2021-04-12 PROCEDURE — 80061 LIPID PANEL: CPT | Performed by: INTERNAL MEDICINE

## 2021-04-12 PROCEDURE — 77067 SCR MAMMO BI INCL CAD: CPT

## 2021-04-12 PROCEDURE — 85025 COMPLETE CBC W/AUTO DIFF WBC: CPT | Performed by: INTERNAL MEDICINE

## 2021-04-12 PROCEDURE — 36415 COLL VENOUS BLD VENIPUNCTURE: CPT | Performed by: INTERNAL MEDICINE

## 2021-04-12 PROCEDURE — 80048 BASIC METABOLIC PNL TOTAL CA: CPT | Performed by: INTERNAL MEDICINE

## 2021-04-12 PROCEDURE — 99396 PREV VISIT EST AGE 40-64: CPT | Performed by: INTERNAL MEDICINE

## 2021-04-12 ASSESSMENT — MIFFLIN-ST. JEOR: SCORE: 1098.4

## 2021-04-12 NOTE — PROGRESS NOTES
SUBJECTIVE:   CC: Promise Nunez is an 60 year old woman who presents for preventive health visit.       Patient has been advised of split billing requirements and indicates understanding: Yes  Healthy Habits:     Getting at least 3 servings of Calcium per day:  Yes    Bi-annual eye exam:  Yes    Dental care twice a year:  Yes    Sleep apnea or symptoms of sleep apnea:  None    Diet:  Regular (no restrictions)    Frequency of exercise:  2-3 days/week    Duration of exercise:  45-60 minutes    Taking medications regularly:  Yes    Medication side effects:  None    PHQ-2 Total Score: 0    Additional concerns today:  Yes      Today's PHQ-2 Score:   PHQ-2 ( 1999 [employer]) 4/11/2021   Q1: Little interest or pleasure in doing things 0   Q2: Feeling down, depressed or hopeless 0   PHQ-2 Score 0   Q1: Little interest or pleasure in doing things Not at all   Q2: Feeling down, depressed or hopeless Not at all   PHQ-2 Score 0       Abuse: Current or Past (Physical, Sexual or Emotional) - No  Do you feel safe in your environment? Yes        Social History     Tobacco Use     Smoking status: Never Smoker     Smokeless tobacco: Never Used   Substance Use Topics     Alcohol use: No     Alcohol/week: 0.0 standard drinks     If you drink alcohol do you typically have >3 drinks per day or >7 drinks per week? No    Alcohol Use 4/11/2021   Prescreen: >3 drinks/day or >7 drinks/week? No   Prescreen: >3 drinks/day or >7 drinks/week? -       Reviewed orders with patient.  Reviewed health maintenance and updated orders accordingly - Yes  Labs reviewed in EPIC        Pertinent mammograms are reviewed under the imaging tab.    PAP / HPV Latest Ref Rng & Units 10/28/2015   PAP - NIL   HPV 16 DNA NEG Negative   HPV 18 DNA NEG Negative   OTHER HR HPV NEG Negative     Reviewed and updated as needed this visit by clinical staff                 Reviewed and updated as needed this visit by Provider                Past Medical History:   Diagnosis  "Date     Abnormal echocardiogram     She had CT coronary angiography which was normal     GERD (gastroesophageal reflux disease)         Review of Systems  CONSTITUTIONAL: NEGATIVE for fever, chills, change in weight  INTEGUMENTARY/SKIN: NEGATIVE for worrisome rashes, moles or lesions  EYES: NEGATIVE for vision changes or irritation  ENT: NEGATIVE for ear, mouth and throat problems  RESP: NEGATIVE for significant cough or SOB  BREAST: NEGATIVE for masses, tenderness or discharge  CV: NEGATIVE for chest pain, palpitations or peripheral edema  GI: NEGATIVE for nausea, abdominal pain, heartburn, or change in bowel habits  : NEGATIVE for unusual urinary or vaginal symptoms. No vaginal bleeding.  MUSCULOSKELETAL: POSITIVE for chronic right hip pains  NEURO: NEGATIVE for weakness, dizziness or paresthesias  PSYCHIATRIC: NEGATIVE for changes in mood or affect      OBJECTIVE:   /72 (BP Location: Right arm, Patient Position: Sitting, Cuff Size: Adult Regular)   Pulse 77   Temp 97.8  F (36.6  C) (Temporal)   Ht 1.575 m (5' 2\")   Wt 58 kg (127 lb 14.4 oz)   SpO2 98%   BMI 23.39 kg/m    Physical Exam  GENERAL: alert and no distress  EYES: Eyes grossly normal to inspection, PERRL and conjunctivae and sclerae normal  HENT: ear canals and TM's normal, nose and mouth without ulcers or lesions  NECK: no adenopathy, no asymmetry, masses, or scars and thyroid normal to palpation  RESP: lungs clear to auscultation - no rales, rhonchi or wheezes  CV: regular rate and rhythm, normal S1 S2, no S3 or S4, no murmur, click or rub, no peripheral edema and peripheral pulses strong  ABDOMEN: soft, nontender, no hepatosplenomegaly, no masses and bowel sounds normal  MS: chronic right hip pains noted, no edema  SKIN: no suspicious lesions or rashes  NEURO: Normal strength and tone, mentation intact and speech normal  PSYCH: mentation appears normal, affect normal/bright    Diagnostic Test Results:  Labs reviewed in " "Epic    ASSESSMENT/PLAN:   (Z00.00) Routine history and physical examination of adult  (primary encounter diagnosis)  Comment: yearly physical exam today  Plan: Lipid panel reflex to direct LDL Fasting,         Hemoglobin A1c, CBC with platelets and         differential, Basic metabolic panel  (Ca, Cl,         CO2, Creat, Gluc, K, Na, BUN), TSH with free T4        reflex, Vitamin D Deficiency, Fecal colorectal         cancer screen (FIT)      (Z12.31) Visit for screening mammogram  Comment: patient is due for mammogram  Plan: *MA Screening Digital Bilateral      (M25.551) Hip pain, right  Comment: chronic right hip pains  Plan: Orthopedic & Spine  Referral        Patient has been advised of split billing requirements and indicates understanding: Yes  COUNSELING:  Reviewed preventive health counseling, as reflected in patient instructions  Special attention given to:        Regular exercise       Healthy diet/nutrition    Estimated body mass index is 21.95 kg/m  as calculated from the following:    Height as of 6/7/19: 1.575 m (5' 2\").    Weight as of 6/14/20: 54.4 kg (120 lb).        She reports that she has never smoked. She has never used smokeless tobacco.      Counseling Resources:  ATP IV Guidelines  Pooled Cohorts Equation Calculator  Breast Cancer Risk Calculator  BRCA-Related Cancer Risk Assessment: FHS-7 Tool  FRAX Risk Assessment  ICSI Preventive Guidelines  Dietary Guidelines for Americans, 2010  USDA's MyPlate  ASA Prophylaxis  Lung CA Screening    Roxanna Jones MD  [facility]  "

## 2021-04-13 LAB — DEPRECATED CALCIDIOL+CALCIFEROL SERPL-MC: 24 UG/L (ref 20–75)

## 2021-04-14 ENCOUNTER — HOSPITAL ENCOUNTER (EMERGENCY)
Facility: CLINIC | Age: 61
Discharge: HOME | End: 2021-04-14
Attending: NURSE PRACTITIONER | Admitting: NURSE PRACTITIONER
Payer: COMMERCIAL

## 2021-04-14 VITALS
RESPIRATION RATE: 14 BRPM | HEART RATE: 86 BPM | WEIGHT: 131 LBS | OXYGEN SATURATION: 100 % | TEMPERATURE: 97.8 F | BODY MASS INDEX: 23.96 KG/M2 | SYSTOLIC BLOOD PRESSURE: 117 MMHG | DIASTOLIC BLOOD PRESSURE: 66 MMHG

## 2021-04-14 DIAGNOSIS — H11.31 SUBCONJUNCTIVAL HEMORRHAGE OF RIGHT EYE: ICD-10-CM

## 2021-04-14 PROCEDURE — 99283 EMERGENCY DEPT VISIT LOW MDM: CPT

## 2021-04-14 RX ORDER — PROPARACAINE HYDROCHLORIDE 5 MG/ML
1 SOLUTION/ DROPS OPHTHALMIC ONCE
Status: DISCONTINUED | OUTPATIENT
Start: 2021-04-14 | End: 2021-04-14 | Stop reason: HOSPADM

## 2021-04-14 ASSESSMENT — VISUAL ACUITY
OS: 20/20;WITH CORRECTIVE LENSES
OD: 20/30;WITH CORRECTIVE LENSES

## 2021-04-14 ASSESSMENT — ENCOUNTER SYMPTOMS
EYE REDNESS: 1
EYE DISCHARGE: 0
EYE PAIN: 1

## 2021-04-14 NOTE — ED PROVIDER NOTES
History   Chief Complaint:  Eye Problem     HPI   Promise Nunez is a 60 year old female with history of prediabetes, posterior vitreous detachment (left eye), ischemic cardiomyopathy, who presents with an eye problem. Patient was working on her computer today when she felt something in her right eye. She got up to look in the mirror and saw some blood in her right eye. This has happened to her previously in the left eye but was much smaller. She has some persisting discomfort to her right eye but no visual changes at this time. She does normally wear contact in the left eye only but was not wearing them today. She denies any past history of surgery on her eyes. She also denies rubbing her eyes today or any recent blood thinner use.     Review of Systems   Eyes: Positive for pain and redness. Negative for discharge and visual disturbance.   All other systems reviewed and are negative.    Allergies:  No Known Allergies    Medications:  Tylenol  Valtrex    Past Medical History:    GERD  Abnormal echocardiogram   Renal stone  SBO  Posterior vitreous detachment  Ischemic cardiomyopathy      Past Surgical History:    Denies eye surgery      Social History:  Patient presents to the ED with her .    Physical Exam     Patient Vitals for the past 24 hrs:   BP Temp Temp src Pulse Resp SpO2 Weight   04/14/21 1830 117/66 -- -- 86 14 100 % --   04/14/21 1555 (!) 140/78 97.8  F (36.6  C) Temporal 85 16 100 % 59.4 kg (131 lb)       Physical Exam  Nursing notes reviewed. Vitals reviewed.  General: Alert. Well kept.  Eyes:EOMI without pain  Visual acuity: right eye - 20/30; left eye - 20/20.  IOP: right eye - 20; left eye - 18  left eye: conjunctiva non-injected, non-icteric.  right eye: diffuse bullous subconjunctival hemorrhage. No scleral icterus. No foreign body noted on upper eyelid eversion. On slit lamp exam - anterior chamber clear, no hyphema. no foreign body noted in cornea or conjunctiva. Negative Isabelle sign. No  corneal ulcer or abrasion. No dendrites.   Proptosis-absent  Ptosis-absent  Anisocoria- absent  Neck/Throat: Moist mucous membranes. Normal voice.  Cardiac: Regular rhythm. Normal heart sounds.  Pulmonary: Clear and equal breath sounds bilaterally.   Musculoskeletal: Normal gross range of motion of all 4 extremities.   Neurological: Alert and oriented x4.   Skin: Warm and dry. Normal appearance of visualized exposed skin without rashes or petechiae.  Psych: Affect normal. Good eye contact.    Emergency Department Course   Emergency Department Course:    Reviewed:  nursing notes, vitals, past medical history and care everywhere    Assessments:    1716 Initial assessment     1803 I rechecked the patient and discussed the results of her workup thus far.     Consults:     1804 I spoke with Dr. Radford of the [facility] ophthalmology service regarding patient's presentation, findings, and plan of care.    Interventions:  None     Disposition:  The patient was discharged to home.     Impression & Plan   Medical Decision Making:  Promise Nunez is a 60 year old female with history of prediabetes, posterior vitreous detachment (left eye), ischemic cardiomyopathy, who presents with an eye problem.  A broad differential diagnosis was considered including bacterial conjunctivitis, viral conjunctivitis, foreign body, corneal abrasion, chemical vs allergic conjunctivitis, corneal ulcer, HSV, herpes zoster opthalmicus, endopthalmitis, orbital cellulitis, globe rupture,retinal detachment, glaucoma, etc.  Signs and symptoms consistent with a subconjunctival hemorrhage.  No red flag symptoms to suggest any of the above worrisome etiologies.  I spoke with ophthalmology who notes outpatient follow-up in 7 to 10 days is appropriate and no further work-up indicated.  Patient and her  are agreement this plan.  They will return to the ED with any headache, vision changes, pain with movement of the eye, fevers, concerns.    Diagnosis:     ICD-10-CM    1. Subconjunctival hemorrhage of right eye  H11.31        Discharge Medications:  New Prescriptions    CARBOXYMETHYLCELLULOSE (REFRESH LIQUIGEL) 1 % OPHTHALMIC SOLUTION    Place 1 drop into both eyes 3 times daily as needed for dry eyes       Scribe Disclosure:  I, Bari Krause, am serving as a scribe at 5:10 PM on 4/14/2021 to document services personally performed by Nikole Hogan CNP based on my observations and the provider's statements to me.              [place], Nikole, CNP  04/14/21 2120

## 2021-04-14 NOTE — ED TRIAGE NOTES
Pt was working on computer when she felt something happen to her eye. Pt has ruptured blood vessel to right eye with conjunctival swelling. See visual acuity.

## 2021-04-16 ENCOUNTER — IMMUNIZATION (OUTPATIENT)
Dept: PEDIATRICS | Facility: CLINIC | Age: 61
End: 2021-04-16
Payer: COMMERCIAL

## 2021-04-16 PROCEDURE — 0001A PR COVID VAC PFIZER DIL RECON 30 MCG/0.3 ML IM: CPT

## 2021-04-16 PROCEDURE — 91300 PR COVID VAC PFIZER DIL RECON 30 MCG/0.3 ML IM: CPT

## 2021-05-07 ENCOUNTER — IMMUNIZATION (OUTPATIENT)
Dept: PEDIATRICS | Facility: CLINIC | Age: 61
End: 2021-05-07
Attending: INTERNAL MEDICINE
Payer: COMMERCIAL

## 2021-05-07 PROCEDURE — 0002A PR COVID VAC PFIZER DIL RECON 30 MCG/0.3 ML IM: CPT

## 2021-05-07 PROCEDURE — 91300 PR COVID VAC PFIZER DIL RECON 30 MCG/0.3 ML IM: CPT

## 2021-08-20 ENCOUNTER — OFFICE VISIT (OUTPATIENT)
Dept: URGENT CARE | Facility: URGENT CARE | Age: 61
End: 2021-08-20
Payer: COMMERCIAL

## 2021-08-20 VITALS
SYSTOLIC BLOOD PRESSURE: 113 MMHG | DIASTOLIC BLOOD PRESSURE: 81 MMHG | HEART RATE: 102 BPM | BODY MASS INDEX: 23.05 KG/M2 | WEIGHT: 126 LBS | TEMPERATURE: 97.7 F | OXYGEN SATURATION: 96 %

## 2021-08-20 DIAGNOSIS — K52.9 GASTROENTERITIS: Primary | ICD-10-CM

## 2021-08-20 LAB
ALBUMIN SERPL-MCNC: 3.5 G/DL (ref 3.4–5)
ALP SERPL-CCNC: 76 U/L (ref 40–150)
ALT SERPL W P-5'-P-CCNC: 47 U/L (ref 0–50)
AMYLASE SERPL-CCNC: 52 U/L (ref 30–110)
ANION GAP SERPL CALCULATED.3IONS-SCNC: 6 MMOL/L (ref 3–14)
AST SERPL W P-5'-P-CCNC: 41 U/L (ref 0–45)
BASOPHILS # BLD AUTO: 0 10E3/UL (ref 0–0.2)
BASOPHILS NFR BLD AUTO: 0 %
BILIRUB SERPL-MCNC: 0.6 MG/DL (ref 0.2–1.3)
BUN SERPL-MCNC: 13 MG/DL (ref 7–30)
CALCIUM SERPL-MCNC: 8.6 MG/DL (ref 8.5–10.1)
CHLORIDE BLD-SCNC: 105 MMOL/L (ref 94–109)
CO2 SERPL-SCNC: 24 MMOL/L (ref 20–32)
CREAT SERPL-MCNC: 0.67 MG/DL (ref 0.52–1.04)
EOSINOPHIL # BLD AUTO: 0 10E3/UL (ref 0–0.7)
EOSINOPHIL NFR BLD AUTO: 0 %
ERYTHROCYTE [DISTWIDTH] IN BLOOD BY AUTOMATED COUNT: 13.8 % (ref 10–15)
GFR SERPL CREATININE-BSD FRML MDRD: >90 ML/MIN/1.73M2
GLUCOSE BLD-MCNC: 83 MG/DL (ref 70–99)
HCT VFR BLD AUTO: 46 % (ref 35–47)
HGB BLD-MCNC: 16.1 G/DL (ref 11.7–15.7)
LIPASE SERPL-CCNC: 152 U/L (ref 73–393)
LYMPHOCYTES # BLD AUTO: 1.3 10E3/UL (ref 0.8–5.3)
LYMPHOCYTES NFR BLD AUTO: 34 %
MCH RBC QN AUTO: 30.9 PG (ref 26.5–33)
MCHC RBC AUTO-ENTMCNC: 35 G/DL (ref 31.5–36.5)
MCV RBC AUTO: 88 FL (ref 78–100)
MONOCYTES # BLD AUTO: 0.3 10E3/UL (ref 0–1.3)
MONOCYTES NFR BLD AUTO: 9 %
NEUTROPHILS # BLD AUTO: 2.2 10E3/UL (ref 1.6–8.3)
NEUTROPHILS NFR BLD AUTO: 57 %
PLATELET # BLD AUTO: 157 10E3/UL (ref 150–450)
POTASSIUM BLD-SCNC: 4.1 MMOL/L (ref 3.4–5.3)
PROT SERPL-MCNC: 7.3 G/DL (ref 6.8–8.8)
RBC # BLD AUTO: 5.21 10E6/UL (ref 3.8–5.2)
SODIUM SERPL-SCNC: 135 MMOL/L (ref 133–144)
WBC # BLD AUTO: 3.8 10E3/UL (ref 4–11)

## 2021-08-20 PROCEDURE — 99214 OFFICE O/P EST MOD 30 MIN: CPT | Performed by: PHYSICIAN ASSISTANT

## 2021-08-20 PROCEDURE — 80053 COMPREHEN METABOLIC PANEL: CPT | Performed by: PHYSICIAN ASSISTANT

## 2021-08-20 PROCEDURE — 83690 ASSAY OF LIPASE: CPT | Performed by: PHYSICIAN ASSISTANT

## 2021-08-20 PROCEDURE — 85025 COMPLETE CBC W/AUTO DIFF WBC: CPT | Performed by: PHYSICIAN ASSISTANT

## 2021-08-20 PROCEDURE — 36415 COLL VENOUS BLD VENIPUNCTURE: CPT | Performed by: PHYSICIAN ASSISTANT

## 2021-08-20 PROCEDURE — 82150 ASSAY OF AMYLASE: CPT | Performed by: PHYSICIAN ASSISTANT

## 2021-08-20 RX ORDER — CIPROFLOXACIN 750 MG/1
750 TABLET, FILM COATED ORAL 2 TIMES DAILY
Qty: 20 TABLET | Refills: 0 | Status: SHIPPED | OUTPATIENT
Start: 2021-08-20 | End: 2021-08-30

## 2021-08-20 RX ORDER — METRONIDAZOLE 500 MG/1
500 TABLET ORAL 3 TIMES DAILY
Qty: 30 TABLET | Refills: 0 | Status: SHIPPED | OUTPATIENT
Start: 2021-08-20 | End: 2021-08-30

## 2021-08-20 NOTE — PATIENT INSTRUCTIONS
Patient Education     Noninfectious Gastroenteritis (Adult)    Gastroenteritis can cause nausea, vomiting, diarrhea, and cramping in the belly. This may occur from food sensitivity, inflammation of your gastrointestinal tract, medicines, stress, or other causes not related to infection. Your symptoms will usually last from 1 to 3 days, but can last longer. Antibiotics are not effective, but simple home treatment will be helpful.  Home care  Medicine    You may use acetaminophen or NSAID medicines like ibuprofen or naproxen to control fever, unless another medicine is prescribed. (Note: If you have chronic liver or kidney disease, or ever had a stomach ulcer or gastrointestinaI bleeding, talk with your healthcare provider before using these medicines.) Aspirin should never be used in anyone under 18 years of age who is ill with a fever. It may cause severe liver damage. Don't increase your NSAID medicines if you are already taking these medicines for another condition (like arthritis). Don't use NSAIDS if you are on aspirin (such as for heart disease, or after a stroke).    If medicines for diarrhea or vomiting are prescribed, take only as directed.  General care and preventing spread of the illness    If symptoms are severe, rest at home for the next 24 hours or until you feel better.    Hand washing with soap and water is the best way to prevent the spread of infection. Wash your hands after touching anyone who is sick.    Wash your hands after using the toilet and before meals. Clean the toilet after each use.    Caffeine, tobacco, and alcohol can make your diarrhea, cramping, and pain worse.  Diet    Water and clear liquids are important so you do not get dehydrated. Drink a small amount at a time.    Don't force yourself to eat, especially if you have cramps, vomiting, or diarrhea. When you finally decide to start eating, do not eat large amounts at a time, even if you are hungry.    If you eat, avoid fatty,  greasy, spicy, or fried foods.    Don't eat dairy products if you have diarrhea; they can make the diarrhea worse.  During the first 24 hours (the first full day), follow the diet below:    Beverages: Water, clear liquids, soft drinks without caffeine, like ginger ale; mineral water (plain or flavored); decaffeinated tea and coffee.    Soups: Clear broth, consommé, and bouillon sports drinks aren't a good choice because they have too much sugar and not enough electrolytes. In this case, commercially available products called oral rehydration solutions are best.    Desserts: Plain gelatin, ice pops, and fruit juice bars  During the next 24 hours (the second day), you may add the following to the above if you have improved. If not, continue what you did the first day:    Hot cereal, plain toast, bread, rolls, crackers    Plain noodles, rice, mashed potatoes, chicken noodle or rice soup    Unsweetened canned fruit and bananas (don't eat pineapple or citrus)    Limit caffeine and chocolate. No spices or seasonings except salt.  During the next 24 hours    Gradually resume a normal diet, as you feel better and your symptoms improve.    If at any time your symptoms start getting worse, go back to clear liquids until you feel better.    Food preparation    If you have diarrhea, you should not prepare food for others. When you  prepare food for yourself, wash your hands before and after.    Wash your hands after using cutting boards, countertops, and knives that have been in contact with raw food.    Keep uncooked meats away from cooked and ready-to-eat foods.    Follow-up care  Follow up with your healthcare provider if you are not improving over the next 2 to 3 days, or as advised. If a stool (diarrhea) sample was taken, call for the results as directed.  Call 911  Call 911 if any of these occur:    Trouble breathing    Chest pain    Confusion    Severe drowsiness or trouble awakening    Seizure    Stiff neck  When to  seek medical advice  Call your healthcare provider right away if any of these occur:     Increasing belly pain or constant lower right belly pain    Continued vomiting (unable to keep liquids down)    Frequent diarrhea (more than 5 times a day)    Blood in vomit or stool (black or red color)    Inability to tolerate solid food after a few days.    Dark urine, reduced urine output    Weakness, dizziness    Drowsiness    Fever of 100.4 F (38.0 C) or higher, or as directed by your healthcare provider    New rash  [employer] last reviewed this educational content on 3/1/2018    4565-1344 [employer]. All rights reserved. This information is not intended as a substitute for professional medical care. Always follow your healthcare professional's instructions.

## 2021-08-20 NOTE — PROGRESS NOTES
Gastroenteritis  - CBC with platelets and differential; Future  - Comprehensive metabolic panel (BMP + Alb, Alk Phos, ALT, AST, Total. Bili, TP); Future  - Lipase; Future  - Amylase; Future  - Enteric Bacteria and Virus Panel by [facility]RE[facility]LE Stool; Future  - Clostridium difficile Toxin B PCR; Future  - ciprofloxacin (CIPRO) 750 MG tablet; Take 1 tablet (750 mg) by mouth 2 times daily for 10 days  - metroNIDAZOLE (FLAGYL) 500 MG tablet; Take 1 tablet (500 mg) by mouth 3 times daily for 10 days    30 minutes spent on the date of the encounter doing chart review, history and exam, documentation and further activities per the note     See Patient Instructions  Patient Instructions     Patient Education     Noninfectious Gastroenteritis (Adult)    Gastroenteritis can cause nausea, vomiting, diarrhea, and cramping in the belly. This may occur from food sensitivity, inflammation of your gastrointestinal tract, medicines, stress, or other causes not related to infection. Your symptoms will usually last from 1 to 3 days, but can last longer. Antibiotics are not effective, but simple home treatment will be helpful.  Home care  Medicine    You may use acetaminophen or NSAID medicines like ibuprofen or naproxen to control fever, unless another medicine is prescribed. (Note: If you have chronic liver or kidney disease, or ever had a stomach ulcer or gastrointestinaI bleeding, talk with your healthcare provider before using these medicines.) Aspirin should never be used in anyone under 18 years of age who is ill with a fever. It may cause severe liver damage. Don't increase your NSAID medicines if you are already taking these medicines for another condition (like arthritis). Don't use NSAIDS if you are on aspirin (such as for heart disease, or after a stroke).    If medicines for diarrhea or vomiting are prescribed, take only as directed.  General care and preventing spread of the illness    If symptoms are severe, rest at home for the  next 24 hours or until you feel better.    Hand washing with soap and water is the best way to prevent the spread of infection. Wash your hands after touching anyone who is sick.    Wash your hands after using the toilet and before meals. Clean the toilet after each use.    Caffeine, tobacco, and alcohol can make your diarrhea, cramping, and pain worse.  Diet    Water and clear liquids are important so you do not get dehydrated. Drink a small amount at a time.    Don't force yourself to eat, especially if you have cramps, vomiting, or diarrhea. When you finally decide to start eating, do not eat large amounts at a time, even if you are hungry.    If you eat, avoid fatty, greasy, spicy, or fried foods.    Don't eat dairy products if you have diarrhea; they can make the diarrhea worse.  During the first 24 hours (the first full day), follow the diet below:    Beverages: Water, clear liquids, soft drinks without caffeine, like ginger ale; mineral water (plain or flavored); decaffeinated tea and coffee.    Soups: Clear broth, consommé, and bouillon sports drinks aren't a good choice because they have too much sugar and not enough electrolytes. In this case, commercially available products called oral rehydration solutions are best.    Desserts: Plain gelatin, ice pops, and fruit juice bars  During the next 24 hours (the second day), you may add the following to the above if you have improved. If not, continue what you did the first day:    Hot cereal, plain toast, bread, rolls, crackers    Plain noodles, rice, mashed potatoes, chicken noodle or rice soup    Unsweetened canned fruit and bananas (don't eat pineapple or citrus)    Limit caffeine and chocolate. No spices or seasonings except salt.  During the next 24 hours    Gradually resume a normal diet, as you feel better and your symptoms improve.    If at any time your symptoms start getting worse, go back to clear liquids until you feel better.    Food  preparation    If you have diarrhea, you should not prepare food for others. When you  prepare food for yourself, wash your hands before and after.    Wash your hands after using cutting boards, countertops, and knives that have been in contact with raw food.    Keep uncooked meats away from cooked and ready-to-eat foods.    Follow-up care  Follow up with your healthcare provider if you are not improving over the next 2 to 3 days, or as advised. If a stool (diarrhea) sample was taken, call for the results as directed.  Call 911  Call 911 if any of these occur:    Trouble breathing    Chest pain    Confusion    Severe drowsiness or trouble awakening    Seizure    Stiff neck  When to seek medical advice  Call your healthcare provider right away if any of these occur:     Increasing belly pain or constant lower right belly pain    Continued vomiting (unable to keep liquids down)    Frequent diarrhea (more than 5 times a day)    Blood in vomit or stool (black or red color)    Inability to tolerate solid food after a few days.    Dark urine, reduced urine output    Weakness, dizziness    Drowsiness    Fever of 100.4 F (38.0 C) or higher, or as directed by your healthcare provider    New rash  [employer] last reviewed this educational content on 3/1/2018    6644-3138 [employer]. All rights reserved. This information is not intended as a substitute for professional medical care. Always follow your healthcare professional's instructions.               EM[facility] URGENT [facility]    Subjective   61 year old who presents to clinic today for the following health issues:    Urgent Care and Nausea       HPI     Abdominal/Flank Pain  Onset/Duration: 3 days  Description:   Character: Fullness, Cramping and pressure  Location: Generalized   Radiation: None  Intensity: 9/10  Progression of Symptoms:  waxing and waning  Accompanying Signs & Symptoms:  Fever/Chills: YES- intermittent fever.    Gas/Bloating: YES  Nausea: YES- Once last night  Vomitting: no   Diarrhea: YES-Yellow  Constipation: no  Dysuria or Hematuria: no  History:   Trauma: no  Previous similar pain: no  Previous tests done: none  Precipitating factors:   Does the pain change with:     Food: YES    Bowel Movement: no    Urination: no   Other factors:  Lying down is uncomfortable   Therapies tried and outcome: Acetaminophen and simethicone.   No LMP recorded. Patient is postmenopausal.     Patient states that she ate some cheese shortly before her symptoms began. Patient traveled [place] seas at the beginning of July. No recent antibiotic use.     Review of Systems   Review of Systems   See HPI     Objective    Temp: 97.7  F (36.5  C) Temp src: Tympanic BP: 113/81 Pulse: 102     SpO2: 96 %       Physical Exam   Physical Exam  Constitutional:       General: She is not in acute distress.     Appearance: Normal appearance. She is normal weight. She is not ill-appearing, toxic-appearing or diaphoretic.   HENT:      Head: Normocephalic and atraumatic.   Cardiovascular:      Rate and Rhythm: Normal rate.      Pulses: Normal pulses.      Heart sounds: Normal heart sounds. No murmur heard.   No friction rub. No gallop.    Pulmonary:      Effort: Pulmonary effort is normal. No respiratory distress.      Breath sounds: Normal breath sounds. No stridor. No wheezing, rhonchi or rales.   Chest:      Chest wall: No tenderness.   Abdominal:      General: Bowel sounds are normal. There is no distension or abdominal bruit. There are no signs of injury.      Palpations: Abdomen is soft. There is no shifting dullness, fluid wave, hepatomegaly, splenomegaly, mass or pulsatile mass.      Tenderness: There is generalized abdominal tenderness. There is no right CVA tenderness, left CVA tenderness, guarding or rebound. Negative signs include Rovsing's sign, psoas sign and obturator sign.      Hernia: No hernia is present.      Comments: Abdominal tenderness is  generalized but seems to be slightly more focal around the RUQ.   Musculoskeletal:      Cervical back: Normal range of motion and neck supple. No rigidity or tenderness.   Lymphadenopathy:      Cervical: No cervical adenopathy.   Neurological:      General: No focal deficit present.      Mental Status: She is alert and oriented to person, place, and time. Mental status is at baseline.      Gait: Gait normal.   Psychiatric:         Mood and Affect: Mood normal.         Thought Content: Thought content normal.         Judgment: Judgment normal.          Results for orders placed or performed in visit on 08/20/21 (from the past 24 hour(s))   CBC with platelets and differential    Narrative    The following orders were created for panel order CBC with platelets and differential.  Procedure                               Abnormality         Status                     ---------                               -----------         ------                     CBC with platelets and d...[[id number]]  Abnormal            Final result                 Please view results for these tests on the individual orders.   Comprehensive metabolic panel (BMP + Alb, Alk Phos, ALT, AST, Total. Bili, TP)   Result Value Ref Range    Sodium 135 133 - 144 mmol/L    Potassium 4.1 3.4 - 5.3 mmol/L    Chloride 105 94 - 109 mmol/L    Carbon Dioxide (CO2)      Anion Gap      Urea Nitrogen      Creatinine      Calcium      Glucose      Alkaline Phosphatase      AST      ALT      Protein Total      Albumin      Bilirubin Total      GFR Estimate     Lipase   Result Value Ref Range    Lipase 152 73 - 393 U/L   CBC with platelets and differential   Result Value Ref Range    WBC Count 3.8 (L) 4.0 - 11.0 10e3/uL    RBC Count 5.21 (H) 3.80 - 5.20 10e6/uL    Hemoglobin 16.1 (H) 11.7 - 15.7 g/dL    Hematocrit 46.0 35.0 - 47.0 %    MCV 88 78 - 100 fL    MCH 30.9 26.5 - 33.0 pg    MCHC 35.0 31.5 - 36.5 g/dL    RDW 13.8 10.0 - 15.0 %    Platelet Count 157 150 - 450  10e3/uL    % Neutrophils 57 %    % Lymphocytes 34 %    % Monocytes 9 %    % Eosinophils 0 %    % Basophils 0 %    Absolute Neutrophils 2.2 1.6 - 8.3 10e3/uL    Absolute Lymphocytes 1.3 0.8 - 5.3 10e3/uL    Absolute Monocytes 0.3 0.0 - 1.3 10e3/uL    Absolute Eosinophils 0.0 0.0 - 0.7 10e3/uL    Absolute Basophils 0.0 0.0 - 0.2 10e3/uL

## 2021-08-21 ENCOUNTER — NURSE TRIAGE (OUTPATIENT)
Dept: NURSING | Facility: CLINIC | Age: 61
End: 2021-08-21

## 2021-08-21 ENCOUNTER — LAB (OUTPATIENT)
Dept: LAB | Facility: CLINIC | Age: 61
End: 2021-08-21
Payer: COMMERCIAL

## 2021-08-21 DIAGNOSIS — K52.9 GASTROENTERITIS: ICD-10-CM

## 2021-08-21 LAB
C COLI+JEJUNI+LARI FUSA STL QL NAA+PROBE: NOT DETECTED
C DIFF TOX B STL QL: NEGATIVE
EC STX1 GENE STL QL NAA+PROBE: NOT DETECTED
EC STX2 GENE STL QL NAA+PROBE: NOT DETECTED
NOROV GI+II ORF1-ORF2 JNC STL QL NAA+PR: NOT DETECTED
RVA NSP5 STL QL NAA+PROBE: NOT DETECTED
SALMONELLA SP RPOD STL QL NAA+PROBE: NOT DETECTED
SHIGELLA SP+EIEC IPAH STL QL NAA+PROBE: NOT DETECTED
V CHOL+PARA RFBL+TRKH+TNAA STL QL NAA+PR: NOT DETECTED
Y ENTERO RECN STL QL NAA+PROBE: NOT DETECTED

## 2021-08-21 PROCEDURE — 87506 IADNA-DNA/RNA PROBE TQ 6-11: CPT

## 2021-08-21 PROCEDURE — 87493 C DIFF AMPLIFIED PROBE: CPT | Mod: 59

## 2021-08-22 NOTE — TELEPHONE ENCOUNTER
"Triage note:    Patient called with  by her side to report that she is still having diarrhea today. Patient given consent of patient's  Ankur being on the line.Patient reports consuming 4-5 cups of fluid through out the day, urinating as usual and 5x or more of diarrhea. Patient states she has not started the imodium, but has started the antibiotics. Patient states lab results that came back had low WBC and worried about cointinuing 2 antibiotics? Patient states after visit summary not , \"Antibiotics are not effective, but simple home treatment will be helpful.\" Patient is hesitant to continue the antibiotics with all normal blood and stool results.    Per protocol patient to go to ED now or PCP triage. Paged Dr. Darwin Huggins and suggested for patient to go to ED now. Provider unable to fully assess and patient needs to be seen with HCP. Relayed message to patient and . Patient decided to drink more fluids and was given home care advice per protocol and when to call back.Patient verbalized understanding and will make a follow up appointment with PCP. Patient states that she is not having a hard time taking fluids in, but will increase fluid intake now and start Imodium. Patient states she will see how she feels tomorrow with home care advice given.      Roseanna Olmos RN   08/21/21 11:59 PM  [facility] Nurse Advisor      COVID 19 Nurse Triage Plan/Patient Instructions    Please be aware that novel coronavirus (COVID-19) may be circulating in the community. If you develop symptoms such as fever, cough, or SOB or if you have concerns about the presence of another infection including coronavirus (COVID-19), please contact your health care provider or visit [email or web address].     Disposition/Instructions    ED Visit recommended. Follow protocol based instructions.     Bring Your Own Device:  Please also bring your smart device(s) (smart phones, tablets, laptops) and their charging " cables for your personal use and to communicate with your care team during your visit.    Thank you for taking steps to prevent the spread of this virus.  o Limit your contact with others.  o Wear a simple mask to cover your cough.  o Wash your hands well and often.    Resources    [facility]: About COVID-19: [email or web address]    CDC: What to Do If You're Sick: www.cdc.gov/coronavirus/2019-ncov/about/steps-when-sick.html    CDC: Ending Home Isolation: www.cdc.gov/coronavirus/2019-ncov/hcp/disposition-in-home-patients.html     CDC: Caring for Someone: www.cdc.gov/coronavirus/2019-ncov/if-you-are-sick/care-for-someone.html     [facility]: Interim Guidance for Hospital Discharge to Home: www.health.[facility].[place]/diseases/coronavirus/hcp/hospdischarge.pdf    [facility] clinical trials (COVID-19 research studies): [email or web address]     Below are the COVID-19 hotlines at the [place] Department of Health ([facility]). Interpreters are available.   o For health questions: Call 639-372-9853 or 1-237.300.4790 (7 a.m. to 7 p.m.)  o For questions about schools and childcare: Call 991-211-7389 or 1-839.814.9636 (7 a.m. to 7 p.m.)                     Reason for Disposition    Patient sounds very sick or weak to the triager    Additional Information    Negative: Shock suspected (e.g., cold/pale/clammy skin, too weak to stand, low BP, rapid pulse)    Negative: Difficult to awaken or acting confused (e.g., disoriented, slurred speech)    Negative: Sounds like a life-threatening emergency to the triager    Negative: Vomiting also present and worse than the diarrhea    Negative: [1] Blood in stool AND [2] without diarrhea    Negative: Diarrhea in a cancer patient who is currently (or recently) receiving chemotherapy or radiation therapy, or cancer patient who has metastatic or end-stage cancer and is receiving palliative care    Negative: [1] SEVERE abdominal pain (e.g., excruciating) AND  [2] present > 1 hour    Negative: [1] SEVERE abdominal pain AND [2] age > 60    Negative: [1] Blood in the stool AND [2] moderate or large amount of blood    Negative: Black or tarry bowel movements  (Exception: chronic-unchanged  black-grey bowel movements AND is taking iron pills or Pepto-bismol)    Negative: [1] Drinking very little AND [2] dehydration suspected (e.g., no urine > 12 hours, very dry mouth, very lightheaded)    Protocols used: DIARRHEA-A-AH

## 2021-08-30 ENCOUNTER — OFFICE VISIT (OUTPATIENT)
Dept: FAMILY MEDICINE | Facility: CLINIC | Age: 61
End: 2021-08-30
Payer: COMMERCIAL

## 2021-08-30 VITALS
HEIGHT: 62 IN | OXYGEN SATURATION: 99 % | RESPIRATION RATE: 16 BRPM | SYSTOLIC BLOOD PRESSURE: 108 MMHG | BODY MASS INDEX: 22.82 KG/M2 | TEMPERATURE: 97.5 F | WEIGHT: 124 LBS | DIASTOLIC BLOOD PRESSURE: 70 MMHG | HEART RATE: 64 BPM

## 2021-08-30 DIAGNOSIS — Z12.11 SCREEN FOR COLON CANCER: ICD-10-CM

## 2021-08-30 DIAGNOSIS — A08.4 VIRAL GASTROENTERITIS: Primary | ICD-10-CM

## 2021-08-30 PROCEDURE — 99212 OFFICE O/P EST SF 10 MIN: CPT | Performed by: INTERNAL MEDICINE

## 2021-08-30 ASSESSMENT — MIFFLIN-ST. JEOR: SCORE: 1075.71

## 2021-08-30 NOTE — PROGRESS NOTES
This is a follow-up visit for her recent urgent care visit.  I reviewed that note, labs, and stool tests.    As noted, the patient presented with fevers, bloating, and diarrhea that was nonbloody.  She had not been on any recent antibiotics.  She was given Cipro and Flagyl to take and when her test came back negative these were stopped so she only took 1 days worth.  Since then her symptoms have resolved.  She has had no bowel movement since Friday and Fridays was very normal.  She no longer has abdominal discomfort or bloating.  No nausea or vomiting or fevers.  No bloody or black stools.  She has not had this before.  She has had no new medications.    Past Medical History:   Diagnosis Date     Abnormal echocardiogram     She had CT coronary angiography which was normal     GERD (gastroesophageal reflux disease)      Past Surgical History:   Procedure Laterality Date     None       Social History     Socioeconomic History     Marital status:      Spouse name: Not on file     Number of children: Not on file     Years of education: Not on file     Highest education level: Not on file   Occupational History     Not on file   Tobacco Use     Smoking status: Never Smoker     Smokeless tobacco: Never Used   Substance and Sexual Activity     Alcohol use: No     Alcohol/week: 0.0 standard drinks     Drug use: No     Sexual activity: Yes     Partners: Male   Other Topics Concern     Parent/sibling w/ CABG, MI or angioplasty before 65F 55M? Not Asked   Social History Narrative    ** Merged History Encounter **          Social Determinants of Health     Financial Resource Strain:      Difficulty of Paying Living Expenses:    Food Insecurity:      Worried About Running Out of Food in the Last Year:      Ran Out of Food in the Last Year:    Transportation Needs:      Lack of Transportation (Medical):      Lack of Transportation (Non-Medical):    Physical Activity:      Days of Exercise per Week:      Minutes of Exercise  "per Session:    Stress:      Feeling of Stress :    Social Connections:      Frequency of Communication with Friends and Family:      Frequency of Social Gatherings with Friends and Family:      Attends [employer] Services:      Active Member of Clubs or Organizations:      Attends Club or Organization Meetings:      Marital Status:    Intimate Partner Violence:      Fear of Current or Ex-Partner:      Emotionally Abused:      Physically Abused:      Sexually Abused:      Current Outpatient Medications   Medication Sig Dispense Refill     diphenhydrAMINE-acetaminophen (TYLENOL PM)  MG tablet Take 1 tablet by mouth nightly as needed for sleep       Green Tea, Katelyn sinensis, (GREEN TEA EXTRACT PO) Take 800 mg by mouth daily        multivitamin w/minerals (MULTI-VITAMIN) tablet Take 1 tablet by mouth daily       valACYclovir (VALTREX) 500 MG tablet Take 500 mg by mouth daily as needed        Allergies   Allergen Reactions     No Known Allergies      FAMILY HISTORY NOTED AND REVIEWED    REVIEW OF SYSTEMS: above    PHYSICAL EXAM    /70 (BP Location: Right arm, Patient Position: Sitting, Cuff Size: Adult Regular)   Pulse 64   Temp 97.5  F (36.4  C) (Temporal)   Resp 16   Ht 1.575 m (5' 2\")   Wt 56.2 kg (124 lb)   SpO2 99%   BMI 22.68 kg/m      Patient appears non toxic  Abdomen normal active bowel sounds, soft, non-tender, no mgr, no hepatosplenomegaly    ASSESSMENT:  Probable viral gastroenteritis, now resolved.  I doubt ischemic colitis, diverticulitis, c. Diff, colon ca    PLAN:  Call if more symptoms  Colonoscopy ordered    Bhanu Katz M.D.        "

## 2021-08-30 NOTE — PATIENT INSTRUCTIONS
I will have someone call you to do the colon exam.  Call us if you have more symptoms    Bhanu Katz M.D.

## 2021-09-06 ENCOUNTER — OFFICE VISIT (OUTPATIENT)
Dept: URGENT CARE | Facility: URGENT CARE | Age: 61
End: 2021-09-06
Payer: COMMERCIAL

## 2021-09-06 VITALS
RESPIRATION RATE: 16 BRPM | DIASTOLIC BLOOD PRESSURE: 60 MMHG | TEMPERATURE: 97.6 F | WEIGHT: 124 LBS | SYSTOLIC BLOOD PRESSURE: 115 MMHG | HEART RATE: 83 BPM | BODY MASS INDEX: 22.68 KG/M2

## 2021-09-06 DIAGNOSIS — R10.9 ABDOMINAL CRAMPS: Primary | ICD-10-CM

## 2021-09-06 LAB
ALBUMIN SERPL-MCNC: 3.2 G/DL (ref 3.4–5)
ALP SERPL-CCNC: 69 U/L (ref 40–150)
ALT SERPL W P-5'-P-CCNC: 35 U/L (ref 0–50)
ANION GAP SERPL CALCULATED.3IONS-SCNC: 2 MMOL/L (ref 3–14)
AST SERPL W P-5'-P-CCNC: 25 U/L (ref 0–45)
BASOPHILS # BLD AUTO: 0 10E3/UL (ref 0–0.2)
BASOPHILS NFR BLD AUTO: 0 %
BILIRUB SERPL-MCNC: 0.8 MG/DL (ref 0.2–1.3)
BUN SERPL-MCNC: 6 MG/DL (ref 7–30)
CALCIUM SERPL-MCNC: 8.6 MG/DL (ref 8.5–10.1)
CHLORIDE BLD-SCNC: 105 MMOL/L (ref 94–109)
CO2 SERPL-SCNC: 27 MMOL/L (ref 20–32)
CREAT SERPL-MCNC: 0.6 MG/DL (ref 0.52–1.04)
EOSINOPHIL # BLD AUTO: 0 10E3/UL (ref 0–0.7)
EOSINOPHIL NFR BLD AUTO: 1 %
ERYTHROCYTE [DISTWIDTH] IN BLOOD BY AUTOMATED COUNT: 13.8 % (ref 10–15)
GFR SERPL CREATININE-BSD FRML MDRD: >90 ML/MIN/1.73M2
GLUCOSE BLD-MCNC: 96 MG/DL (ref 70–99)
HCT VFR BLD AUTO: 42.7 % (ref 35–47)
HGB BLD-MCNC: 13.9 G/DL (ref 11.7–15.7)
LYMPHOCYTES # BLD AUTO: 2.6 10E3/UL (ref 0.8–5.3)
LYMPHOCYTES NFR BLD AUTO: 34 %
MCH RBC QN AUTO: 30 PG (ref 26.5–33)
MCHC RBC AUTO-ENTMCNC: 32.6 G/DL (ref 31.5–36.5)
MCV RBC AUTO: 92 FL (ref 78–100)
MONOCYTES # BLD AUTO: 0.8 10E3/UL (ref 0–1.3)
MONOCYTES NFR BLD AUTO: 11 %
NEUTROPHILS # BLD AUTO: 4.3 10E3/UL (ref 1.6–8.3)
NEUTROPHILS NFR BLD AUTO: 55 %
PLATELET # BLD AUTO: 184 10E3/UL (ref 150–450)
POTASSIUM BLD-SCNC: 4.3 MMOL/L (ref 3.4–5.3)
PROT SERPL-MCNC: 7.3 G/DL (ref 6.8–8.8)
RBC # BLD AUTO: 4.63 10E6/UL (ref 3.8–5.2)
SODIUM SERPL-SCNC: 134 MMOL/L (ref 133–144)
WBC # BLD AUTO: 7.8 10E3/UL (ref 4–11)

## 2021-09-06 PROCEDURE — 99214 OFFICE O/P EST MOD 30 MIN: CPT | Performed by: FAMILY MEDICINE

## 2021-09-06 PROCEDURE — 36415 COLL VENOUS BLD VENIPUNCTURE: CPT | Performed by: FAMILY MEDICINE

## 2021-09-06 PROCEDURE — 85025 COMPLETE CBC W/AUTO DIFF WBC: CPT | Performed by: FAMILY MEDICINE

## 2021-09-06 PROCEDURE — 80053 COMPREHEN METABOLIC PANEL: CPT | Performed by: FAMILY MEDICINE

## 2021-09-06 RX ORDER — DICYCLOMINE HYDROCHLORIDE 10 MG/1
10 CAPSULE ORAL 4 TIMES DAILY PRN
Qty: 30 CAPSULE | Refills: 0 | Status: SHIPPED | OUTPATIENT
Start: 2021-09-06 | End: 2021-12-27

## 2021-09-06 NOTE — PROGRESS NOTES
Assessment & Plan     Abdominal cramps  - Comprehensive metabolic panel (BMP + Alb, Alk Phos, ALT, AST, Total. Bili, TP)  - CBC with platelets and differential  - dicyclomine (BENTYL) 10 MG capsule  Dispense: 30 capsule; Refill: 0     Occasional loose(r) stools but passing solid elvira. Discomfort with eating that is lower abdomen and generally LLQ. She has no guarding and abd exam is not suggest of a surgical abdomen. Absent of fever. Will draw bloodwork at this time to screen for acute abnormalities. Should symptoms persist over next 1-2 days I recommended they call Doctor's office at which point it could be determined if she could be referred to the [facility] center in [place] to have further workup (such as CT) performed ([facility] service to avoid emergency room visits).     Diverticulitis, gastroenteritis/colitis, gas pain and constipation are on differential. Hx is not suggestive of obstruction    Toney Abrams MD   [place] [facility]    Juanpablo Virgen is a 61 year old female who presents to clinic today for the following health issues:  Chief Complaint   Patient presents with     Diarrhea     diarrhea and abdominal cramps on and off X 3 weeks      HPI    Initially started with 6 days of loose stools on 8/16/21 -- had no issues with hydration . She had done well without illness for 11 days at which point things restarted.   Her current episodes of diarrhea are accompanied with cramps. -- She took loperamide/immodium --there has been no blood in stools.   No vomiting  Temp of 100.5 yesterday responsive to tylenol       No hx of diverticulitis  She was on a course of cirpofloxacin    NO hx of abdominal surgeries.     Her appetite has been decreased but drinking fluids well.     Today's stools are slightly loose with elvira .     No recent travel      SHe is scheduling a colonoscopy as she has never had one down    Patient Active Problem List    Diagnosis Date Noted     Small bowel obstruction  (H) 12/25/2018     Priority: Medium     Shoulder pain 07/28/2016     Priority: Medium     Greater tuberosity of humerus fracture 07/28/2016     Priority: Medium     CARDIOVASCULAR SCREENING; LDL GOAL LESS THAN 160 10/22/2014     Priority: Medium     History of renal stone 06/11/2014     Priority: Medium       Current Outpatient Medications   Medication     dicyclomine (BENTYL) 10 MG capsule     diphenhydrAMINE-acetaminophen (TYLENOL PM)  MG tablet     Green Tea, Katelyn sinensis, (GREEN TEA EXTRACT PO)     multivitamin w/minerals (MULTI-VITAMIN) tablet     valACYclovir (VALTREX) 500 MG tablet     No current facility-administered medications for this visit.     Facility-Administered Medications Ordered in Other Visits   Medication     ondansetron (ZOFRAN) 2 MG/ML injection         Objective    /60   Pulse 83   Temp 97.6  F (36.4  C) (Oral)   Resp 16   Wt 56.2 kg (124 lb)   BMI 22.68 kg/m    Physical Exam     ABD: no guarding, soft, no palpable masses  CV: HDS    Results for orders placed or performed in visit on 09/06/21   Comprehensive metabolic panel (BMP + Alb, Alk Phos, ALT, AST, Total. Bili, TP)     Status: Abnormal   Result Value Ref Range    Sodium 134 133 - 144 mmol/L    Potassium 4.3 3.4 - 5.3 mmol/L    Chloride 105 94 - 109 mmol/L    Carbon Dioxide (CO2) 27 20 - 32 mmol/L    Anion Gap 2 (L) 3 - 14 mmol/L    Urea Nitrogen 6 (L) 7 - 30 mg/dL    Creatinine 0.60 0.52 - 1.04 mg/dL    Calcium 8.6 8.5 - 10.1 mg/dL    Glucose 96 70 - 99 mg/dL    Alkaline Phosphatase 69 40 - 150 U/L    AST 25 0 - 45 U/L    ALT 35 0 - 50 U/L    Protein Total 7.3 6.8 - 8.8 g/dL    Albumin 3.2 (L) 3.4 - 5.0 g/dL    Bilirubin Total 0.8 0.2 - 1.3 mg/dL    GFR Estimate >90 >60 mL/min/1.73m2   CBC with platelets and differential     Status: None    Narrative    The following orders were created for panel order CBC with platelets and differential.  Procedure                               Abnormality         Status                      ---------                               -----------         ------                     CBC with platelets and d...[[id number]]                      Final result                 Please view results for these tests on the individual orders.   CBC with platelets and differential     Status: None   Result Value Ref Range    WBC Count 7.8 4.0 - 11.0 10e3/uL    RBC Count 4.63 3.80 - 5.20 10e6/uL    Hemoglobin 13.9 11.7 - 15.7 g/dL    Hematocrit 42.7 35.0 - 47.0 %    MCV 92 78 - 100 fL    MCH 30.0 26.5 - 33.0 pg    MCHC 32.6 31.5 - 36.5 g/dL    RDW 13.8 10.0 - 15.0 %    Platelet Count 184 150 - 450 10e3/uL    % Neutrophils 55 %    % Lymphocytes 34 %    % Monocytes 11 %    % Eosinophils 1 %    % Basophils 0 %    Absolute Neutrophils 4.3 1.6 - 8.3 10e3/uL    Absolute Lymphocytes 2.6 0.8 - 5.3 10e3/uL    Absolute Monocytes 0.8 0.0 - 1.3 10e3/uL    Absolute Eosinophils 0.0 0.0 - 0.7 10e3/uL    Absolute Basophils 0.0 0.0 - 0.2 10e3/uL                     The use of Dragon/[employer] services may have been used to construct the content in this note; any grammatical or spelling errors are non-intentional. Please contact the author of this note directly if you are in need of any clarification.

## 2021-09-06 NOTE — PATIENT INSTRUCTIONS
Dicyclomine take every 6 hours for stomach /abdominal cramps      Heat packs can also be helpful      Take tylenol 325-650mg every 4-6 hours as needed for pain       If your symptoms get worse please return right away to be evaluated

## 2021-09-07 ENCOUNTER — TELEPHONE (OUTPATIENT)
Dept: FAMILY MEDICINE | Facility: CLINIC | Age: 61
End: 2021-09-07

## 2021-09-07 PROCEDURE — 99285 EMERGENCY DEPT VISIT HI MDM: CPT | Mod: 25

## 2021-09-07 ASSESSMENT — MIFFLIN-ST. JEOR: SCORE: 1041.7

## 2021-09-07 NOTE — TELEPHONE ENCOUNTER
Patient visit urgent care yesterday and is trying to schedule a CT scan. She states the  provider told her to call today if she is continue to have issues and get a CT scan done. According to chart  provider told her to call her primary provider to see if they can place a order to [facility] to do further work up like a CT. Patient states she seen 3 time for the same issues and doesn't want to be seen again and is hoping Dr. Katz can place the order for her. Please call patient on her  phone at [phone].  Chyna Calzada   [facility]  Central Scheduler

## 2021-09-08 ENCOUNTER — APPOINTMENT (OUTPATIENT)
Dept: CT IMAGING | Facility: CLINIC | Age: 61
End: 2021-09-08
Attending: EMERGENCY MEDICINE
Payer: COMMERCIAL

## 2021-09-08 ENCOUNTER — HOSPITAL ENCOUNTER (EMERGENCY)
Facility: CLINIC | Age: 61
Discharge: HOME | End: 2021-09-08
Attending: EMERGENCY MEDICINE | Admitting: EMERGENCY MEDICINE
Payer: COMMERCIAL

## 2021-09-08 ENCOUNTER — TELEPHONE (OUTPATIENT)
Dept: GASTROENTEROLOGY | Facility: OUTPATIENT CENTER | Age: 61
End: 2021-09-08

## 2021-09-08 VITALS
DIASTOLIC BLOOD PRESSURE: 71 MMHG | OXYGEN SATURATION: 99 % | BODY MASS INDEX: 22.66 KG/M2 | WEIGHT: 120 LBS | HEART RATE: 89 BPM | SYSTOLIC BLOOD PRESSURE: 104 MMHG | HEIGHT: 61 IN | RESPIRATION RATE: 16 BRPM | TEMPERATURE: 97.5 F

## 2021-09-08 DIAGNOSIS — Z11.59 ENCOUNTER FOR SCREENING FOR OTHER VIRAL DISEASES: ICD-10-CM

## 2021-09-08 DIAGNOSIS — K52.9 COLITIS: ICD-10-CM

## 2021-09-08 LAB
ALBUMIN SERPL-MCNC: 3.4 G/DL (ref 3.4–5)
ALP SERPL-CCNC: 65 U/L (ref 40–150)
ALT SERPL W P-5'-P-CCNC: 39 U/L (ref 0–50)
ANION GAP SERPL CALCULATED.3IONS-SCNC: 2 MMOL/L (ref 3–14)
AST SERPL W P-5'-P-CCNC: 23 U/L (ref 0–45)
BASOPHILS # BLD AUTO: 0 10E3/UL (ref 0–0.2)
BASOPHILS NFR BLD AUTO: 0 %
BILIRUB SERPL-MCNC: 0.5 MG/DL (ref 0.2–1.3)
BUN SERPL-MCNC: 9 MG/DL (ref 7–30)
CALCIUM SERPL-MCNC: 8.6 MG/DL (ref 8.5–10.1)
CHLORIDE BLD-SCNC: 107 MMOL/L (ref 94–109)
CO2 SERPL-SCNC: 26 MMOL/L (ref 20–32)
CREAT SERPL-MCNC: 0.6 MG/DL (ref 0.52–1.04)
EOSINOPHIL # BLD AUTO: 0.1 10E3/UL (ref 0–0.7)
EOSINOPHIL NFR BLD AUTO: 1 %
ERYTHROCYTE [DISTWIDTH] IN BLOOD BY AUTOMATED COUNT: 13.2 % (ref 10–15)
GFR SERPL CREATININE-BSD FRML MDRD: >90 ML/MIN/1.73M2
GLUCOSE BLD-MCNC: 92 MG/DL (ref 70–99)
HCT VFR BLD AUTO: 41.6 % (ref 35–47)
HGB BLD-MCNC: 13.8 G/DL (ref 11.7–15.7)
IMM GRANULOCYTES # BLD: 0 10E3/UL
IMM GRANULOCYTES NFR BLD: 0 %
LYMPHOCYTES # BLD AUTO: 2.2 10E3/UL (ref 0.8–5.3)
LYMPHOCYTES NFR BLD AUTO: 36 %
MCH RBC QN AUTO: 29.7 PG (ref 26.5–33)
MCHC RBC AUTO-ENTMCNC: 33.2 G/DL (ref 31.5–36.5)
MCV RBC AUTO: 90 FL (ref 78–100)
MONOCYTES # BLD AUTO: 0.7 10E3/UL (ref 0–1.3)
MONOCYTES NFR BLD AUTO: 12 %
NEUTROPHILS # BLD AUTO: 3.1 10E3/UL (ref 1.6–8.3)
NEUTROPHILS NFR BLD AUTO: 51 %
NRBC # BLD AUTO: 0 10E3/UL
NRBC BLD AUTO-RTO: 0 /100
PLATELET # BLD AUTO: 217 10E3/UL (ref 150–450)
POTASSIUM BLD-SCNC: 3.6 MMOL/L (ref 3.4–5.3)
PROT SERPL-MCNC: 7.9 G/DL (ref 6.8–8.8)
RBC # BLD AUTO: 4.65 10E6/UL (ref 3.8–5.2)
SODIUM SERPL-SCNC: 135 MMOL/L (ref 133–144)
WBC # BLD AUTO: 6.1 10E3/UL (ref 4–11)

## 2021-09-08 PROCEDURE — 74177 CT ABD & PELVIS W/CONTRAST: CPT

## 2021-09-08 PROCEDURE — 80053 COMPREHEN METABOLIC PANEL: CPT | Performed by: EMERGENCY MEDICINE

## 2021-09-08 PROCEDURE — 36415 COLL VENOUS BLD VENIPUNCTURE: CPT | Performed by: EMERGENCY MEDICINE

## 2021-09-08 PROCEDURE — 85025 COMPLETE CBC W/AUTO DIFF WBC: CPT | Performed by: EMERGENCY MEDICINE

## 2021-09-08 PROCEDURE — 250N000011 HC RX IP 250 OP 636: Performed by: EMERGENCY MEDICINE

## 2021-09-08 PROCEDURE — 250N000009 HC RX 250: Performed by: EMERGENCY MEDICINE

## 2021-09-08 RX ORDER — IOPAMIDOL 755 MG/ML
60 INJECTION, SOLUTION INTRAVASCULAR ONCE
Status: COMPLETED | OUTPATIENT
Start: 2021-09-08 | End: 2021-09-08

## 2021-09-08 RX ADMIN — IOPAMIDOL 60 ML: 755 INJECTION, SOLUTION INTRAVENOUS at 03:50

## 2021-09-08 RX ADMIN — SODIUM CHLORIDE 60 ML: 9 INJECTION, SOLUTION INTRAVENOUS at 03:50

## 2021-09-08 ASSESSMENT — ENCOUNTER SYMPTOMS
DIARRHEA: 1
ABDOMINAL PAIN: 1

## 2021-09-08 NOTE — DISCHARGE INSTRUCTIONS
You should follow-up in GI clinic as you have planned.  You can continue to take your Bentyl and the Imodium to help with your symptoms.  I do recommend altering dose of Tylenol and ibuprofen.  Should you develop worsening pain, fevers

## 2021-09-08 NOTE — TELEPHONE ENCOUNTER
Screening Questions    Is patient active on mychart?  YES    1. What insurance is in the chart? [employer]    2. Ordering/Referring Provider: YADIRA BACA    3. BMI 23.8    4. Are you on daily home oxygen? NO    5. Do you have a history of difficult airway? NO    6. Have you had a heart, lung or liver transplant? NO    7. Are you currently on dialysis? NO    8. Have you had a stroke or Transient ischemic atttack (TIA) within 6 months? NO    9. In the past 6 months, have you had any heart related issues including cardiomyopathy or heart attack?  NO       If yes, did it require cardiac stenting or other implantable device?     10. Do you have any implantable devices in your body (pacemaker, defib, LVAD)? NO    11. Do you take nitroglycerin? If yes, how often? NO    12. Are you currently taking any blood thinners? NO    13. Are you a diabetic? NO    14. (Females) Are you currently pregnant? NO   If yes, how many weeks?    15. Have you had a procedure in the past that was difficult to tolerate with conscious sedation? Any allergies to Fentanyl or Versed?  NO    16. Are you taking any scheduled prescription narcotics more than once daily? NO    17. Do you have any chemical dependencies such as alcohol, street drugs, or methadone? NO    18. Do you have any history of post-traumatic stress syndrome or mental health issues? NO    19. Do you transfer independently? YES    20.  Do you have any issues with constipation? NO    21. Preferred pharmacy for prep prescription: X    Scheduling Details    Which Colonoscopy prep was sent?  MIRALAX  Procedure Scheduled:  COLONOSCOPY  Provider/Surgeon:  PADMAJA  Date of Procedure:  9-22  Location:  [facility]  Caller (Please ask for phone number if not scheduled by patient):  SELF &       Sedation Type: CS      Pre-op Required at [facility], [place], [place] and OR for MAC sedation: NO  If yes, advise patient they will need a pre-op prior to procedure.     Is patient on blood thinners?  NO     If yes, inform patient to follow up with PCP or provider for follow up instructions.        Informed patient they will need an adult ?  YES  Cannot take any type of public or medical transportation alone    Informed Patient of COVID Test Requirement?  YES    Confirmed Nurse will call to complete assessment?  YES    Additional comments: NONE

## 2021-09-08 NOTE — ED PROVIDER NOTES
"  History     Chief Complaint:  Diarrhea      The history is provided by the patient and the spouse.      Promise Nunez is a 61 year old female with a history of GERD, small bowel obstruction, and renal stones who presents with diarrhea. The patient has been experiencing intermittent cramps and diarrhea since August 16th. She notes that he stool appeared yellow at one point. The patient took Imodium and antibiotics for her symptoms, but has since stopped. Additionally, she has been taking Vitamin B12 and Omega-3 fatty acids. She had a stool culture taken which came back negative. She notes taking a trip to [place], but did not feel sick when she was there. She denies taking any laxatives recently. There is no association of fevers.     Review of Systems   Gastrointestinal: Positive for abdominal pain (Cramps) and diarrhea.   All other systems reviewed and are negative.      Allergies:  No Known Allergies    Medications:    Valtrex   Bentyl     Past Medical History:    GERD  SBO   Greater tuberosity of humerus fracture   Renal stone   Ischemic cardiomyopathy     Past Surgical History:    Patient denies pertinent past surgical history.     Family History:    Mother - cataracts, HTN, Type 2 dm  Father - diabetes, cataracts, CVD    Social History:  Patient was accompanied to the ED with her .    Physical Exam     Patient Vitals for the past 24 hrs:   BP Temp Temp src Pulse Resp SpO2 Height Weight   09/07/21 2105 123/64 97.5  F (36.4  C) Temporal 89 16 99 % 1.549 m (5' 1\") 54.4 kg (120 lb)       Physical Exam  Constitutional: Alert, attentive, GCS 15  HENT:    Nose: Nose normal.   Eyes: EOM are normal, anicteric, conjugate gaze  CV: regular rate and rhythm; no murmurs  Chest: Effort normal and breath sounds clear without wheezing or rales, symmetric bilaterally   GI: Mild left diffuse abdominal tenderness. No distension. No guarding or rebound.    MSK: No LE edema, no tenderness to palpation of " BLE.  Neurological: Alert, attentive, moving all extremities equally.   Skin: Skin is warm and dry.      Emergency Department Course   Imaging:  CT Abdomen Pelvis w Contrast  Preliminary Result  IMPRESSION:   1.  Diffuse low-density wall thickening throughout the rectosigmoid colon with increased mucosal enhancement. Findings compatible with a nonspecific proctitis/colitis. Clinical correlation.  2.  No definite acute findings elsewhere.  3.  Cholelithiasis. No biliary dilatation.  Per Radiology     Laboratory:  CBC: WBC 6.1, HGB 13.8,     CMP: Anion gap 2 (L) o/w WNL (Creatinine 0.60)     Emergency Department Course:    Reviewed:  I reviewed nursing notes, vitals, past history and care everywhere    Assessments:  0436 I obtained history and examined the patient as noted above.     Disposition:  The patient was discharged to home.    Impression & Plan    Medical Decision Makin-year-old woman past medical history significant for small bowel obstruction presenting for evaluation of left-sided cramping abdominal pain associated with intermittent diarrhea with her first episode occurring in the middle of August and more recently the last few days.  She was seen in urgent care she had negative stool panel and her symptoms resolved after short course of Imodium only to recur over the last few days.  She denies any fevers, she has no blood in her stool however due to her diffuse tenderness in the left CT imaging with obtained, this fortunately shows no evidence of diverticulitis, obstruction or perforation.  It does show nonspecific diffuse proctocolitis.  She does not have a white count here, I have low suspicion for infectious etiology.  She does not have pain out of portion exam, do not suspect ischemic etiology.  She has not had antibiotic use recently.  She did travel to [place] but was not ill there.  I recommended continued conservative management with Bentyl which she was prescribed yesterday, Imodium  OTC as needed, I recommended low-dose NSAIDs as well as Tylenol for any mild discomfort.  She does plan to follow-up with her GI doctor for consideration of colonoscopy.  Certainly IBD has to be considered based on the geographic distribution, lower suspicion for IBS.  Return precautions reviewed including worsening pain, blood in her stool or fever.      Covid-19  Promise Nunez was evaluated during a global COVID-19 pandemic, which necessitated consideration that the patient might be at risk for infection with the SARS-CoV-2 virus that causes COVID-19.   Applicable protocols for evaluation were followed during the patient's care.       Diagnosis:    ICD-10-CM    1. Colitis  K52.9        Alton Gibson MD  [employer]  6:00 AM 09/08/21     Alton Gibson MD  [employer]  6:00 AM 09/08/21     Scribe Disclosure:  I, Kiel Rai, am serving as a scribe at 3:03 AM on 9/8/2021 to document services personally performed by Alton Gibson MD based on my observations and the provider's statements to me.      Alton Gibson MD  09/08/21 0600

## 2021-09-20 ENCOUNTER — LAB (OUTPATIENT)
Dept: URGENT CARE | Facility: URGENT CARE | Age: 61
End: 2021-09-20
Attending: COLON & RECTAL SURGERY
Payer: COMMERCIAL

## 2021-09-20 ENCOUNTER — NURSE TRIAGE (OUTPATIENT)
Dept: NURSING | Facility: CLINIC | Age: 61
End: 2021-09-20

## 2021-09-20 DIAGNOSIS — Z11.59 ENCOUNTER FOR SCREENING FOR OTHER VIRAL DISEASES: ICD-10-CM

## 2021-09-20 LAB — SARS-COV-2 RNA RESP QL NAA+PROBE: NEGATIVE

## 2021-09-20 PROCEDURE — U0003 INFECTIOUS AGENT DETECTION BY NUCLEIC ACID (DNA OR RNA); SEVERE ACUTE RESPIRATORY SYNDROME CORONAVIRUS 2 (SARS-COV-2) (CORONAVIRUS DISEASE [COVID-19]), AMPLIFIED PROBE TECHNIQUE, MAKING USE OF HIGH THROUGHPUT TECHNOLOGIES AS DESCRIBED BY CMS-2020-01-R: HCPCS

## 2021-09-20 PROCEDURE — U0005 INFEC AGEN DETEC AMPLI PROBE: HCPCS

## 2021-09-20 NOTE — TELEPHONE ENCOUNTER
Asking what is simethicone max per day. 500mg per day per mylicon [facility] site. She has instructions to take 4 125mg tablets before coming in for colonoscopy.     She is confirming scheduled for [facility].      Katya Granados RN  [facility] Nurse Advisor

## 2021-09-22 ENCOUNTER — HOSPITAL ENCOUNTER (OUTPATIENT)
Facility: CLINIC | Age: 61
Discharge: HOME | End: 2021-09-22
Attending: COLON & RECTAL SURGERY | Admitting: COLON & RECTAL SURGERY
Payer: COMMERCIAL

## 2021-09-22 VITALS
HEART RATE: 82 BPM | OXYGEN SATURATION: 98 % | BODY MASS INDEX: 21.9 KG/M2 | WEIGHT: 116 LBS | RESPIRATION RATE: 16 BRPM | DIASTOLIC BLOOD PRESSURE: 70 MMHG | SYSTOLIC BLOOD PRESSURE: 99 MMHG | HEIGHT: 61 IN

## 2021-09-22 LAB — COLONOSCOPY: NORMAL

## 2021-09-22 PROCEDURE — G0121 COLON CA SCRN NOT HI RSK IND: HCPCS | Performed by: COLON & RECTAL SURGERY

## 2021-09-22 PROCEDURE — 250N000011 HC RX IP 250 OP 636: Performed by: COLON & RECTAL SURGERY

## 2021-09-22 PROCEDURE — 45378 DIAGNOSTIC COLONOSCOPY: CPT | Performed by: COLON & RECTAL SURGERY

## 2021-09-22 PROCEDURE — 258N000003 HC RX IP 258 OP 636: Performed by: COLON & RECTAL SURGERY

## 2021-09-22 PROCEDURE — G0500 MOD SEDAT ENDO SERVICE >5YRS: HCPCS | Performed by: COLON & RECTAL SURGERY

## 2021-09-22 RX ORDER — ONDANSETRON 2 MG/ML
4 INJECTION INTRAMUSCULAR; INTRAVENOUS
Status: DISCONTINUED | OUTPATIENT
Start: 2021-09-22 | End: 2021-09-22 | Stop reason: HOSPADM

## 2021-09-22 RX ORDER — SODIUM CHLORIDE 9 MG/ML
INJECTION, SOLUTION INTRAVENOUS CONTINUOUS PRN
Status: COMPLETED | OUTPATIENT
Start: 2021-09-22 | End: 2021-09-22

## 2021-09-22 RX ORDER — LIDOCAINE 40 MG/G
CREAM TOPICAL
Status: DISCONTINUED | OUTPATIENT
Start: 2021-09-22 | End: 2021-09-22 | Stop reason: HOSPADM

## 2021-09-22 RX ORDER — FENTANYL CITRATE 50 UG/ML
INJECTION, SOLUTION INTRAMUSCULAR; INTRAVENOUS PRN
Status: COMPLETED | OUTPATIENT
Start: 2021-09-22 | End: 2021-09-22

## 2021-09-22 RX ADMIN — FENTANYL CITRATE 100 MCG: 50 INJECTION, SOLUTION INTRAMUSCULAR; INTRAVENOUS at 12:51

## 2021-09-22 RX ADMIN — MIDAZOLAM 1 MG: 1 INJECTION INTRAMUSCULAR; INTRAVENOUS at 12:57

## 2021-09-22 RX ADMIN — SODIUM CHLORIDE 500 ML: 0.9 INJECTION, SOLUTION INTRAVENOUS at 12:15

## 2021-09-22 RX ADMIN — MIDAZOLAM 2 MG: 1 INJECTION INTRAMUSCULAR; INTRAVENOUS at 12:51

## 2021-09-22 ASSESSMENT — MIFFLIN-ST. JEOR: SCORE: 1023.55

## 2021-09-22 NOTE — H&P
Colon & Rectal Surgery History and Physical  Pre-Endoscopy Procedure Note    History of Present Illness   I have been asked by Dr. Jones to evaluate this 61 year old female for colorectal cancer screening. She currently denies any abdominal pain, weight loss, bleeding per rectum, or recent change in bowel habits.    Past Medical History  Diagnosis Date     Abnormal echocardiogram     CT coronary angiography which was normal     GERD (gastroesophageal reflux disease)        Past Surgical History  Procedure Laterality Date     None          Medications   Medication Sig     valACYclovir (VALTREX) 500 MG tablet Take 500 mg by mouth daily as needed      dicyclomine (BENTYL) 10 MG capsule Take 1 capsule (10 mg) by mouth 4 times daily as needed (abd cramps)     diphenhydrAMINE-acetaminophen (TYLENOL PM)  MG tablet Take 1 tablet by mouth nightly as needed for sleep     Green Tea, Katelyn sinensis, (GREEN TEA EXTRACT PO) Take 800 mg by mouth daily      multivitamin w/minerals (MULTI-VITAMIN) tablet Take 1 tablet by mouth daily       Allergies  Allergen Reactions     No Known Allergies         Family History   Family history includes Diabetes in her father and mother; Hypertension in her mother.     Social History   She reports that she has never smoked. She has never used smokeless tobacco. She reports that she does not drink alcohol and does not use drugs.    Review of Systems   Constitutional:  No fever, weight change or fatigue.    Eyes:     No dry eyes or vision changes.   Ears/Nose/Throat/Neck:  No oral ulcers, sore throat or voice change.    Cardiovascular:   No palpitations, syncope, angina or edema.   Respiratory:    No chest pain, excessive sleepiness, shortness of breath or hemoptysis.    Gastrointestinal:   No abdominal pain, nausea, vomiting, diarrhea or heartburn.    Genitourinary:   No dysuria, hematuria, urinary retention or urinary frequency.   Musculoskeletal:  No joint swelling or arthralgias.  "   Dermatologic:  No skin rash or other skin changes.   Neurologic:    No focal weakness or numbness. No neuropathy.   Psychiatric:    No depression, anxiety, suicidal ideation, or paranoid ideation.   Endocrine:   No cold or heat intolerance, polydipsia, hirsutism, change in libido, or flushing.   Hematology/Lymphatic:  No bleeding or lymphadenopathy.    Allergy/Immunology:  No rhinitis or hives.     Physical Exam   Vitals:  Blood pressure 120/83, pulse 101, resp. rate 15, height 1.549 m (5' 1\"), weight 52.6 kg (116 lb), SpO2 98 %, not currently breastfeeding.    General:  Alert and oriented to person, place and time   Airway: Normal oropharyngeal airway and neck mobility   Lungs:  Clear bilaterally   Heart:  Regular rate and rhythm   Abdomen: Soft, NT, ND, no masses   Extremities: Warm, good capillary refill    ASA Grade: II (mild systemic disease)    Impression: Cleared for use of conscious sedation for colorectal cancer screening    Plan: Proceed with colonoscopy     Edwina Smith MD  [facility]  [phone]  "

## 2021-10-02 ENCOUNTER — HEALTH MAINTENANCE LETTER (OUTPATIENT)
Age: 61
End: 2021-10-02

## 2021-12-14 ENCOUNTER — TELEPHONE (OUTPATIENT)
Dept: FAMILY MEDICINE | Facility: CLINIC | Age: 61
End: 2021-12-14
Payer: COMMERCIAL

## 2021-12-14 NOTE — TELEPHONE ENCOUNTER
Reason for Call:  Form, our goal is to have forms completed with 72 hours, however, some forms may require a visit or additional information.    Type of letter, form or note:  Colon Cancer Screening reimbursement    Who is the form from?: Insurance comp - [facility]    Where did the form come from: Attached in Photoways message    What clinic location was the form placed at?: [facility] [place]    Where the form was placed: Provider inbox at desk Box/Folder    What number is listed as a contact on the form?: [phone]       Additional comments: Phot[employer]ays message did not indicate how Pt would like form returned    Call taken on 12/14/2021 at 2:35 PM by Meredith Jara

## 2021-12-29 NOTE — TELEPHONE ENCOUNTER
Called and spoke to patient - requested form to be mailed. One copy placed in accordion folder (blue pod) and in Scanning.     Yissel PERSAUD MA on 12/29/2021 at 2:46 PM

## 2022-05-14 ENCOUNTER — HEALTH MAINTENANCE LETTER (OUTPATIENT)
Age: 62
End: 2022-05-14

## 2022-05-16 ENCOUNTER — OFFICE VISIT (OUTPATIENT)
Dept: FAMILY MEDICINE | Facility: CLINIC | Age: 62
End: 2022-05-16
Payer: COMMERCIAL

## 2022-05-16 VITALS
HEART RATE: 71 BPM | OXYGEN SATURATION: 100 % | SYSTOLIC BLOOD PRESSURE: 118 MMHG | TEMPERATURE: 97.1 F | WEIGHT: 115 LBS | RESPIRATION RATE: 16 BRPM | BODY MASS INDEX: 21.16 KG/M2 | DIASTOLIC BLOOD PRESSURE: 72 MMHG | HEIGHT: 62 IN

## 2022-05-16 DIAGNOSIS — Z11.4 SCREENING FOR HIV (HUMAN IMMUNODEFICIENCY VIRUS): ICD-10-CM

## 2022-05-16 DIAGNOSIS — M62.838 NECK MUSCLE SPASM: ICD-10-CM

## 2022-05-16 DIAGNOSIS — Z00.00 ROUTINE HISTORY AND PHYSICAL EXAMINATION OF ADULT: Primary | ICD-10-CM

## 2022-05-16 DIAGNOSIS — B18.2 CHRONIC HEPATITIS C WITHOUT HEPATIC COMA (H): ICD-10-CM

## 2022-05-16 DIAGNOSIS — Z76.0 ENCOUNTER FOR MEDICATION REFILL: ICD-10-CM

## 2022-05-16 DIAGNOSIS — Z11.59 NEED FOR HEPATITIS C SCREENING TEST: ICD-10-CM

## 2022-05-16 LAB
BASOPHILS # BLD AUTO: 0 10E3/UL (ref 0–0.2)
BASOPHILS NFR BLD AUTO: 1 %
DEPRECATED CALCIDIOL+CALCIFEROL SERPL-MC: 42 UG/L (ref 20–75)
EOSINOPHIL # BLD AUTO: 0.1 10E3/UL (ref 0–0.7)
EOSINOPHIL NFR BLD AUTO: 3 %
ERYTHROCYTE [DISTWIDTH] IN BLOOD BY AUTOMATED COUNT: 14 % (ref 10–15)
HBA1C MFR BLD: 5.5 % (ref 0–5.6)
HCT VFR BLD AUTO: 44.3 % (ref 35–47)
HGB BLD-MCNC: 14.9 G/DL (ref 11.7–15.7)
HIV 1+2 AB+HIV1 P24 AG SERPL QL IA: NONREACTIVE
LYMPHOCYTES # BLD AUTO: 2.2 10E3/UL (ref 0.8–5.3)
LYMPHOCYTES NFR BLD AUTO: 48 %
MCH RBC QN AUTO: 30.8 PG (ref 26.5–33)
MCHC RBC AUTO-ENTMCNC: 33.6 G/DL (ref 31.5–36.5)
MCV RBC AUTO: 92 FL (ref 78–100)
MONOCYTES # BLD AUTO: 0.4 10E3/UL (ref 0–1.3)
MONOCYTES NFR BLD AUTO: 8 %
NEUTROPHILS # BLD AUTO: 1.9 10E3/UL (ref 1.6–8.3)
NEUTROPHILS NFR BLD AUTO: 41 %
PLATELET # BLD AUTO: 198 10E3/UL (ref 150–450)
RBC # BLD AUTO: 4.83 10E6/UL (ref 3.8–5.2)
WBC # BLD AUTO: 4.7 10E3/UL (ref 4–11)

## 2022-05-16 PROCEDURE — 87902 NFCT AGT GNTYP ALYS HEP C: CPT | Performed by: INTERNAL MEDICINE

## 2022-05-16 PROCEDURE — 99000 SPECIMEN HANDLING OFFICE-LAB: CPT | Performed by: INTERNAL MEDICINE

## 2022-05-16 PROCEDURE — 99396 PREV VISIT EST AGE 40-64: CPT | Performed by: INTERNAL MEDICINE

## 2022-05-16 PROCEDURE — 84443 ASSAY THYROID STIM HORMONE: CPT | Performed by: INTERNAL MEDICINE

## 2022-05-16 PROCEDURE — 87902 NFCT AGT GNTYP ALYS HEP C: CPT | Mod: 90 | Performed by: INTERNAL MEDICINE

## 2022-05-16 PROCEDURE — 87389 HIV-1 AG W/HIV-1&-2 AB AG IA: CPT | Performed by: INTERNAL MEDICINE

## 2022-05-16 PROCEDURE — 80048 BASIC METABOLIC PNL TOTAL CA: CPT | Performed by: INTERNAL MEDICINE

## 2022-05-16 PROCEDURE — 80061 LIPID PANEL: CPT | Performed by: INTERNAL MEDICINE

## 2022-05-16 PROCEDURE — 36415 COLL VENOUS BLD VENIPUNCTURE: CPT | Performed by: INTERNAL MEDICINE

## 2022-05-16 PROCEDURE — 83036 HEMOGLOBIN GLYCOSYLATED A1C: CPT | Performed by: INTERNAL MEDICINE

## 2022-05-16 PROCEDURE — 86803 HEPATITIS C AB TEST: CPT | Performed by: INTERNAL MEDICINE

## 2022-05-16 PROCEDURE — 85025 COMPLETE CBC W/AUTO DIFF WBC: CPT | Performed by: INTERNAL MEDICINE

## 2022-05-16 PROCEDURE — 82306 VITAMIN D 25 HYDROXY: CPT | Performed by: INTERNAL MEDICINE

## 2022-05-16 RX ORDER — CYCLOBENZAPRINE HCL 5 MG
5 TABLET ORAL DAILY PRN
Qty: 30 TABLET | Refills: 1 | Status: SHIPPED | OUTPATIENT
Start: 2022-05-16 | End: 2023-02-24

## 2022-05-16 RX ORDER — VALACYCLOVIR HYDROCHLORIDE 500 MG/1
500 TABLET, FILM COATED ORAL DAILY
Qty: 30 TABLET | Refills: 5 | Status: SHIPPED | OUTPATIENT
Start: 2022-05-16 | End: 2024-01-17

## 2022-05-16 ASSESSMENT — ENCOUNTER SYMPTOMS
HEMATURIA: 0
DIARRHEA: 0
SHORTNESS OF BREATH: 0
HEARTBURN: 0
WEAKNESS: 0
FREQUENCY: 0
CHILLS: 0
MYALGIAS: 0
JOINT SWELLING: 0
SORE THROAT: 0
HEADACHES: 0
PARESTHESIAS: 0
NERVOUS/ANXIOUS: 0
FEVER: 0
CONSTIPATION: 0
PALPITATIONS: 0
DIZZINESS: 0
EYE PAIN: 0
BREAST MASS: 0
HEMATOCHEZIA: 0
ABDOMINAL PAIN: 0
COUGH: 0
DYSURIA: 0
ARTHRALGIAS: 0
NAUSEA: 0

## 2022-05-16 ASSESSMENT — PAIN SCALES - GENERAL: PAINLEVEL: MODERATE PAIN (5)

## 2022-05-16 ASSESSMENT — PATIENT HEALTH QUESTIONNAIRE - PHQ9
10. IF YOU CHECKED OFF ANY PROBLEMS, HOW DIFFICULT HAVE THESE PROBLEMS MADE IT FOR YOU TO DO YOUR WORK, TAKE CARE OF THINGS AT HOME, OR GET ALONG WITH OTHER PEOPLE: NOT DIFFICULT AT ALL
SUM OF ALL RESPONSES TO PHQ QUESTIONS 1-9: 0
SUM OF ALL RESPONSES TO PHQ QUESTIONS 1-9: 0

## 2022-05-16 NOTE — PROGRESS NOTES
SUBJECTIVE:   CC: Promise Nunez is an 62 year old woman who presents for preventive health visit.       Patient has been advised of split billing requirements and indicates understanding: Yes  Healthy Habits:     Getting at least 3 servings of Calcium per day:  Yes    Bi-annual eye exam:  Yes    Dental care twice a year:  Yes    Sleep apnea or symptoms of sleep apnea:  None    Diet:  Carbohydrate counting    Frequency of exercise:  2-3 days/week    Duration of exercise:  45-60 minutes    Taking medications regularly:  Yes    Barriers to taking medications:  None    Medication side effects:  None    PHQ-2 Total Score: 0    Additional concerns today:  Yes    Ability to successfully perform activities of daily living: Yes, no assistance needed  Home safety:  none identified   Hearing impairment: none      Today's PHQ-2 Score:   PHQ-2 ( 1999 [employer]) 5/16/2022   Q1: Little interest or pleasure in doing things 0   Q2: Feeling down, depressed or hopeless 0   PHQ-2 Score 0   PHQ-2 Total Score (12-17 Years)- Positive if 3 or more points; Administer PHQ-A if positive -   Q1: Little interest or pleasure in doing things Not at all   Q2: Feeling down, depressed or hopeless Not at all   PHQ-2 Score 0       Abuse: Current or Past (Physical, Sexual or Emotional) - No  Do you feel safe in your environment? Yes        Social History     Tobacco Use     Smoking status: Never Smoker     Smokeless tobacco: Never Used   Substance Use Topics     Alcohol use: No     Alcohol/week: 0.0 standard drinks     If you drink alcohol do you typically have >3 drinks per day or >7 drinks per week? No    Alcohol Use 5/16/2022   Prescreen: >3 drinks/day or >7 drinks/week? Not Applicable   Prescreen: >3 drinks/day or >7 drinks/week? -     Reviewed orders with patient.  Reviewed health maintenance and updated orders accordingly - Yes  Labs reviewed in EPIC    Breast Cancer Screening:      Pertinent mammograms are reviewed under the imaging  "tab.      PAP / HPV Latest Ref Rng & Units 10/28/2015   PAP (Historical) - NIL   HPV16 NEG Negative   HPV18 NEG Negative   HRHPV NEG Negative     Reviewed and updated as needed this visit by clinical staff    Allergies  Meds                Reviewed and updated as needed this visit by Provider                   Past Medical History:   Diagnosis Date     Abnormal echocardiogram     She had CT coronary angiography which was normal     GERD (gastroesophageal reflux disease)      Screen for colon cancer 09/2021    nl        Review of Systems  CONSTITUTIONAL: NEGATIVE for fever, chills, change in weight  INTEGUMENTARY/SKIN: NEGATIVE for worrisome rashes, moles or lesions  EYES: NEGATIVE for vision changes or irritation  ENT: NEGATIVE for ear, mouth and throat problems  RESP: NEGATIVE for significant cough or SOB  BREAST: NEGATIVE for masses, tenderness or discharge  CV: NEGATIVE for chest pain, palpitations or peripheral edema  GI: NEGATIVE for nausea, abdominal pain, heartburn, or change in bowel habits  : NEGATIVE for unusual urinary or vaginal symptoms. No vaginal bleeding.  MUSCULOSKELETAL: POSITIVE for chronic muscle spasms of the neck  NEURO: NEGATIVE for weakness, dizziness or paresthesias  PSYCHIATRIC: NEGATIVE for changes in mood or affect      OBJECTIVE:   /72 (BP Location: Right arm, Patient Position: Sitting, Cuff Size: Adult Regular)   Pulse 71   Temp 97.1  F (36.2  C) (Temporal)   Resp 16   Ht 1.562 m (5' 1.5\")   Wt 52.2 kg (115 lb)   SpO2 100%   BMI 21.38 kg/m    Physical Exam  GENERAL: alert and no distress  EYES: Eyes grossly normal to inspection, PERRL and conjunctivae and sclerae normal  HENT: ear canals and TM's normal, nose and mouth without ulcers or lesions  NECK: no adenopathy, no asymmetry, masses, or scars and thyroid normal to palpation  RESP: lungs clear to auscultation - no rales, rhonchi or wheezes  CV: regular rate and rhythm  ABDOMEN: soft, nontender, no hepatosplenomegaly, " no masses and bowel sounds normal  MS: chronic muscle spasms of the posterior neck noted, no edema  SKIN: no suspicious lesions or rashes  NEURO: Normal strength and tone, mentation intact and speech normal  PSYCH: mentation appears normal, affect normal/bright    Diagnostic Test Results:  Labs reviewed in Epic    Results for orders placed or performed in visit on 05/16/22   HIV Antigen Antibody Combo     Status: Normal   Result Value Ref Range    HIV Antigen Antibody Combo Nonreactive Nonreactive   Hepatitis C Screen Reflex to HCV RNA Quant and Genotype     Status: Abnormal   Result Value Ref Range    Hepatitis C Antibody Reactive (A) Nonreactive    Narrative    Assay performance characteristics have not been established for newborns, infants, and children.   Basic metabolic panel  (Ca, Cl, CO2, Creat, Gluc, K, Na, BUN)     Status: Normal   Result Value Ref Range    Sodium 142 133 - 144 mmol/L    Potassium 4.6 3.4 - 5.3 mmol/L    Chloride 109 94 - 109 mmol/L    Carbon Dioxide (CO2) 25 20 - 32 mmol/L    Anion Gap 8 3 - 14 mmol/L    Urea Nitrogen 13 7 - 30 mg/dL    Creatinine 0.57 0.52 - 1.04 mg/dL    Calcium 9.2 8.5 - 10.1 mg/dL    Glucose 90 70 - 99 mg/dL    GFR Estimate >90 >60 mL/min/1.73m2   Lipid panel reflex to direct LDL Fasting     Status: Abnormal   Result Value Ref Range    Cholesterol 227 (H) <200 mg/dL    Triglycerides 104 <150 mg/dL    Direct Measure HDL 82 >=50 mg/dL    LDL Cholesterol Calculated 124 (H) <=100 mg/dL    Non HDL Cholesterol 145 (H) <130 mg/dL    Patient Fasting > 8hrs? Yes     Narrative    Cholesterol  Desirable:  <200 mg/dL    Triglycerides  Normal:  Less than 150 mg/dL  Borderline High:  150-199 mg/dL  High:  200-499 mg/dL  Very High:  Greater than or equal to 500 mg/dL    Direct Measure HDL  Female:  Greater than or equal to 50 mg/dL   Male:  Greater than or equal to 40 mg/dL    LDL Cholesterol  Desirable:  <100mg/dL  Above Desirable:  100-129 mg/dL   Borderline High:  130-159 mg/dL    High:  160-189 mg/dL   Very High:  >= 190 mg/dL    Non HDL Cholesterol  Desirable:  130 mg/dL  Above Desirable:  130-159 mg/dL  Borderline High:  160-189 mg/dL  High:  190-219 mg/dL  Very High:  Greater than or equal to 220 mg/dL   Hemoglobin A1c     Status: Normal   Result Value Ref Range    Hemoglobin A1C 5.5 0.0 - 5.6 %   TSH with free T4 reflex     Status: Normal   Result Value Ref Range    TSH 0.47 0.40 - 4.00 mU/L   Vitamin D Deficiency     Status: Normal   Result Value Ref Range    Vitamin D, Total (25-Hydroxy) 42 20 - 75 ug/L    Narrative    Season, race, dietary intake, and treatment affect the concentration of 25-hydroxy-Vitamin D. Values may decrease during winter months and increase during summer months. Values 20-29 ug/L may indicate Vitamin D insufficiency and values <20 ug/L may indicate Vitamin D deficiency.    Vitamin D determination is routinely performed by an immunoassay specific for 25 hydroxyvitamin D3.  If an individual is on vitamin D2(ergocalciferol) supplementation, please specify 25 OH vitamin D2 and D3 level determination by LCMSMS test [id number].     CBC with platelets and differential     Status: None   Result Value Ref Range    WBC Count 4.7 4.0 - 11.0 10e3/uL    RBC Count 4.83 3.80 - 5.20 10e6/uL    Hemoglobin 14.9 11.7 - 15.7 g/dL    Hematocrit 44.3 35.0 - 47.0 %    MCV 92 78 - 100 fL    MCH 30.8 26.5 - 33.0 pg    MCHC 33.6 31.5 - 36.5 g/dL    RDW 14.0 10.0 - 15.0 %    Platelet Count 198 150 - 450 10e3/uL    % Neutrophils 41 %    % Lymphocytes 48 %    % Monocytes 8 %    % Eosinophils 3 %    % Basophils 1 %    Absolute Neutrophils 1.9 1.6 - 8.3 10e3/uL    Absolute Lymphocytes 2.2 0.8 - 5.3 10e3/uL    Absolute Monocytes 0.4 0.0 - 1.3 10e3/uL    Absolute Eosinophils 0.1 0.0 - 0.7 10e3/uL    Absolute Basophils 0.0 0.0 - 0.2 10e3/uL   CBC with platelets and differential     Status: None    Narrative    The following orders were created for panel order CBC with platelets and  differential.  Procedure                               Abnormality         Status                     ---------                               -----------         ------                     CBC with platelets and d...[[id number]]                      Final result                 Please view results for these tests on the individual orders.         ASSESSMENT/PLAN:   Promise was seen today for physical.    Diagnoses and all orders for this visit:    Routine history and physical examination of adult  -     REVIEW OF HEALTH MAINTENANCE PROTOCOL ORDERS  -     CBC with platelets and differential; Future  -     Basic metabolic panel  (Ca, Cl, CO2, Creat, Gluc, K, Na, BUN); Future  -     Lipid panel reflex to direct LDL Fasting; Future  -     Hemoglobin A1c; Future  -     TSH with free T4 reflex; Future  -     Vitamin D Deficiency; Future  -     CBC with platelets and differential  -     Basic metabolic panel  (Ca, Cl, CO2, Creat, Gluc, K, Na, BUN)  -     Lipid panel reflex to direct LDL Fasting  -     Hemoglobin A1c  -     TSH with free T4 reflex  -     Vitamin D Deficiency    Screening for HIV (human immunodeficiency virus)  -     HIV Antigen Antibody Combo; Future  -     HIV Antigen Antibody Combo    Need for hepatitis C screening test  -     Hepatitis C Screen Reflex to HCV RNA Quant and Genotype; Future  -     Hepatitis C Screen Reflex to HCV RNA Quant and Genotype  -     Hepatitis C RNA, Quantitative by PCR with Confirmatory Reflex to Genotyping    Encounter for medication refill  -     valACYclovir (VALTREX) 500 MG tablet; Take 1 tablet (500 mg) by mouth daily Take 500 mg by mouth daily    Neck muscle spasm  -     cyclobenzaprine (FLEXERIL) 5 MG tablet; Take 1 tablet (5 mg) by mouth daily as needed for muscle spasms    Chronic hepatitis C without hepatic coma (H)  -     Adult Gastro Ref - Consult Only; Future        Patient has been advised of split billing requirements and indicates understanding:  "Yes    COUNSELING:  Reviewed preventive health counseling, as reflected in patient instructions  Special attention given to:        Regular exercise       Healthy diet/nutrition    Estimated body mass index is 21.92 kg/m  as calculated from the following:    Height as of 9/22/21: 1.549 m (5' 1\").    Weight as of 9/22/21: 52.6 kg (116 lb).        She reports that she has never smoked. She has never used smokeless tobacco.      Counseling Resources:  ATP IV Guidelines  Pooled Cohorts Equation Calculator  Breast Cancer Risk Calculator  BRCA-Related Cancer Risk Assessment: FHS-7 Tool  FRAX Risk Assessment  ICSI Preventive Guidelines  Dietary Guidelines for Americans, 2010  USDA's MyPlate  ASA Prophylaxis  Lung CA Screening    Roxanna Jones MD  [facility]  "

## 2022-05-17 LAB
ANION GAP SERPL CALCULATED.3IONS-SCNC: 8 MMOL/L (ref 3–14)
BUN SERPL-MCNC: 13 MG/DL (ref 7–30)
CALCIUM SERPL-MCNC: 9.2 MG/DL (ref 8.5–10.1)
CHLORIDE BLD-SCNC: 109 MMOL/L (ref 94–109)
CHOLEST SERPL-MCNC: 227 MG/DL
CO2 SERPL-SCNC: 25 MMOL/L (ref 20–32)
CREAT SERPL-MCNC: 0.57 MG/DL (ref 0.52–1.04)
FASTING STATUS PATIENT QL REPORTED: YES
GFR SERPL CREATININE-BSD FRML MDRD: >90 ML/MIN/1.73M2
GLUCOSE BLD-MCNC: 90 MG/DL (ref 70–99)
HCV AB SERPL QL IA: REACTIVE
HDLC SERPL-MCNC: 82 MG/DL
LDLC SERPL CALC-MCNC: 124 MG/DL
NONHDLC SERPL-MCNC: 145 MG/DL
POTASSIUM BLD-SCNC: 4.6 MMOL/L (ref 3.4–5.3)
SODIUM SERPL-SCNC: 142 MMOL/L (ref 133–144)
TRIGL SERPL-MCNC: 104 MG/DL
TSH SERPL DL<=0.005 MIU/L-ACNC: 0.47 MU/L (ref 0.4–4)

## 2022-05-18 DIAGNOSIS — B18.2 CHRONIC HEPATITIS C WITHOUT HEPATIC COMA (H): ICD-10-CM

## 2022-05-18 DIAGNOSIS — K70.31 ALCOHOLIC CIRRHOSIS OF LIVER WITH ASCITES (H): Primary | ICD-10-CM

## 2022-05-18 LAB
HCV RNA SERPL NAA+PROBE-ACNC: ABNORMAL IU/ML
HCV RNA SERPL NAA+PROBE-LOG IU: 6.4 {LOG_IU}/ML

## 2022-05-18 NOTE — TELEPHONE ENCOUNTER
RECORDS RECEIVED FROM: Internal   Appt Date: 05.19.2022   NOTES STATUS DETAILS   OFFICE NOTE from referring provider Internal 05.16.2022 Roxanna Jones MD   OFFICE NOTES from other specialists     DISCHARGE SUMMARY from hospital     MEDICATION LIST Internal    LIVER BIOSPY (IF APPLICABLE)      PATHOLOGY REPORTS      IMAGING     ENDOSCOPY (IF AVAILABLE)     COLONOSCOPY (IF AVAILABLE) Internal 09.22.2021   ULTRASOUND LIVER     CT OF ABDOMEN     MRI OF LIVER     FIBROSCAN, US ELASTOGRAPHY, FIBROSIS SCAN, MR ELASTOGRAPHY     LABS     HEPATIC PANEL (LIVER PANEL) Internal 12.25.2018   BASIC METABOLIC PANEL Internal 05.16.2022   COMPLETE METABOLIC PANEL Internal 05.16.2022   COMPLETE BLOOD COUNT (CBC) Internal 05.16.2022   INTERNATIONAL NORMALIZED RATIO (INR)     HEPATITIS C ANTIBODY Internal 05.16.12022   HEPATITIS C VIRAL LOAD/PCR     HEPATITIS C GENOTYPE     HEPATITIS B SURFACE ANTIGEN     HEPATITIS B SURFACE ANTIBODY     HEPATITIS B DNA QUANT LEVEL     HEPATITIS B CORE ANTIBODY

## 2022-05-18 NOTE — PROGRESS NOTES
Hepatology CLINIC VISIT    CC/REFERRING MD:  Roxanna Jones  REASON FOR CONSULTATION: Hepatitis C    ASSESSMENT/PLAN:  61 y/o F who is otherwise healthy who presents with a new diagnosis of hepatitis C.     #. Hepatitis C, treatment native, no evidence of cirrhosis  Viral load: 2,652,655 in May 2022; pending genotype. Platelets > 150, not suggestive of portal HTN. CT abdomen 9/2021 without evidence of cirrhosis or splenomegaly. No prior treatment. Unclear source of hepatitis C infection        PLAN:  - Plan for hepatitis B labs (s Ag, s Ab and c Ab today) along with hepatitis A IgG & INR  - Plan for 8 weeks of Mayvret treatment following by HCV RNA following treatment to assess for SVR. If treatment has been completed and SVR achieved, no need for follow up in liver clinic given no evidence of fibrosis or cirrhosis   * Interactions with her current medications were assessed; none found    Health Care Maintenance:  Vaccinations/Immunity:     - Hepatitis A: Pending  - Hepatitis B: Pending  - Recommend yearly influenza vaccination    Colon Cancer Screening:  - Colonoscopy 9/2021: Normal, no polyps; repeat CRC screening 2031    Return to clinic: PRN based on HCV treatment course    Discussed with hepatology attending, Dr. Espino who agrees with the above assessment and plan.    Page Raya MD  Gastroenterology and Hepatology Fellow  Pager: [phone]    HPI:   Currently,   - Doing well without complaints  - No symptoms of liver decompensation such as jaundice, abdominal swelling, confusion or bleeding  - Unclear where she acquired HCV; denies any blood transfusions,  experience, tattoos, drug use or surgeries. Received vaccinations as a child in [place] before she came to the  in 2005    PERTINENT PAST MEDICAL HISTORY:  Past Medical History:   Diagnosis Date     Abnormal echocardiogram     She had CT coronary angiography which was normal     GERD (gastroesophageal reflux disease)      Screen for colon  cancer 09/2021    nl       PREVIOUS SURGERIES:  Past Surgical History:   Procedure Laterality Date     COLONOSCOPY N/A 9/22/2021    Procedure: COLONOSCOPY;  Surgeon: Edwina Smith MD;  Location:  GI     None       ROS:  10 point ROS was conducted. Pertinent positives and negatives as above.    ALLERGIES:  Allergies   Allergen Reactions     No Known Allergies        PERTINENT MEDICATIONS:  Outpatient Encounter Medications as of 5/19/2022   Medication Sig Dispense Refill     cyclobenzaprine (FLEXERIL) 5 MG tablet Take 1 tablet (5 mg) by mouth daily as needed for muscle spasms 30 tablet 1     glecaprevir-pibrentasvir (MAVYRET) 100-40 MG per tablet Take 3 tablets by mouth daily (with breakfast) 84 tablet 1     multivitamin w/minerals (THERA-VIT-M) tablet Take 1 tablet by mouth daily       valACYclovir (VALTREX) 500 MG tablet Take 1 tablet (500 mg) by mouth daily Take 500 mg by mouth daily 30 tablet 5     [DISCONTINUED] glecaprevir-pibrentasvir (MAVYRET) 100-40 MG per tablet Take 3 tablets by mouth daily (with breakfast) 168 tablet 0     Facility-Administered Encounter Medications as of 5/19/2022   Medication Dose Route Frequency Provider Last Rate Last Admin     ondansetron (ZOFRAN) 2 MG/ML injection                 SOCIAL HISTORY:  Social History     Socioeconomic History     Marital status:      Spouse name: Not on file     Number of children: Not on file     Years of education: Not on file     Highest education level: Not on file   Occupational History     Not on file   Tobacco Use     Smoking status: Never Smoker     Smokeless tobacco: Never Used   Substance and Sexual Activity     Alcohol use: No     Alcohol/week: 0.0 standard drinks     Drug use: No     Sexual activity: Yes     Partners: Male   Other Topics Concern     Parent/sibling w/ CABG, MI or angioplasty before 65F 55M? Not Asked   Social History Narrative    ** Merged History Encounter **          Social Determinants of Health     Financial  Resource Strain: Not on file   Food Insecurity: Not on file   Transportation Needs: Not on file   Physical Activity: Not on file   Stress: Not on file   Social Connections: Not on file   Intimate Partner Violence: Not on file   Housing Stability: Not on file   -   - Moved to the  from [place] in 2005  - Denies tobacco or drugs-  - Intermittent small amounts of alcohol use, 1 drink at a time    FAMILY HISTORY:  Family History   Problem Relation Age of Onset     Hypertension Mother      Diabetes Mother      Diabetes Father    - No family history of liver disease or colon cancer    PHYSICAL EXAMINATION:  Vitals reviewed: /73   Pulse 75   Temp 97.9  F (36.6  C) (Oral)   Wt 52.4 kg (115 lb 9.6 oz)   SpO2 99%   BMI 21.49 kg/m    Wt:   Wt Readings from Last 2 Encounters:   05/19/22 52.4 kg (115 lb 9.6 oz)   05/16/22 52.2 kg (115 lb)      Constitutional: Cooperative, pleasant, no acute distress  Eyes: Conjunctiva anicteric  HEENT: Mask in place  CV: No edema  Respiratory: Unlabored breathing on ambient air  Abd: Nondistended, +bs, no hepatosplenomegaly, nontender, no peritoneal signs  Skin: No jaundice. No spider angiomata. No palmar erythema.   Psych: Normal affect  Neurologic: A&Ox3. No asterixis     PERTINENT STUDIES:  BMP  Recent Labs   Lab Test 05/16/22  1108 09/08/21  0208 09/06/21  1557 08/20/21  1808    135 134 135   POTASSIUM 4.6 3.6 4.3 4.1   CHLORIDE 109 107 105 105   MERYL 9.2 8.6 8.6 8.6   CO2 25 26 27 24   BUN 13 9 6* 13   CR 0.57 0.60 0.60 0.67   GLC 90 92 96 83     CBC  Recent Labs   Lab Test 05/16/22  1108 09/08/21  0208 09/06/21  1557 08/20/21  1808   WBC 4.7 6.1 7.8 3.8*   RBC 4.83 4.65 4.63 5.21*   HGB 14.9 13.8 13.9 16.1*   HCT 44.3 41.6 42.7 46.0   MCV 92 90 92 88   MCH 30.8 29.7 30.0 30.9   MCHC 33.6 33.2 32.6 35.0   RDW 14.0 13.2 13.8 13.8    217 184 157     Liver Enzymes   Recent Labs   Lab Test 09/08/21  0208   PROTTOTAL 7.9   ALBUMIN 3.4   BILITOTAL 0.5   ALKPHOS 65    AST 23   ALT 39      PHQ:   PHQ 5/16/2022   PHQ-9 Total Score 0   Q9: Thoughts of better off dead/self-harm past 2 weeks Not at all      ENDOSCOPY:  Colonoscopy 9/2021: Entire colon is normal.     IMAGING/STUDIES:   CT Abdomen Pelvis w/contrast 9/2021  1.  Diffuse low-density wall thickening throughout the rectosigmoid colon with increased mucosal enhancement. Findings compatible with a nonspecific proctitis/colitis. Clinical correlation.  2.  No definite acute findings elsewhere.  3.  Cholelithiasis. No biliary dilatation.  SPLEEN: Normal.

## 2022-05-19 ENCOUNTER — PRE VISIT (OUTPATIENT)
Dept: GASTROENTEROLOGY | Facility: CLINIC | Age: 62
End: 2022-05-19
Payer: COMMERCIAL

## 2022-05-19 ENCOUNTER — OFFICE VISIT (OUTPATIENT)
Dept: GASTROENTEROLOGY | Facility: CLINIC | Age: 62
End: 2022-05-19
Attending: INTERNAL MEDICINE
Payer: COMMERCIAL

## 2022-05-19 ENCOUNTER — LAB (OUTPATIENT)
Dept: LAB | Facility: CLINIC | Age: 62
End: 2022-05-19
Payer: COMMERCIAL

## 2022-05-19 VITALS
DIASTOLIC BLOOD PRESSURE: 73 MMHG | HEART RATE: 75 BPM | TEMPERATURE: 97.9 F | WEIGHT: 115.6 LBS | SYSTOLIC BLOOD PRESSURE: 126 MMHG | BODY MASS INDEX: 21.49 KG/M2 | OXYGEN SATURATION: 99 %

## 2022-05-19 DIAGNOSIS — B18.2 CHRONIC HEPATITIS C WITHOUT HEPATIC COMA (H): ICD-10-CM

## 2022-05-19 DIAGNOSIS — B18.2 CHRONIC HEPATITIS C WITHOUT HEPATIC COMA (H): Primary | ICD-10-CM

## 2022-05-19 LAB
ALBUMIN SERPL-MCNC: 4 G/DL (ref 3.4–5)
ALP SERPL-CCNC: 77 U/L (ref 40–150)
ALT SERPL W P-5'-P-CCNC: 39 U/L (ref 0–50)
AST SERPL W P-5'-P-CCNC: 34 U/L (ref 0–45)
BILIRUB DIRECT SERPL-MCNC: 0.2 MG/DL (ref 0–0.2)
BILIRUB SERPL-MCNC: 0.7 MG/DL (ref 0.2–1.3)
HAV IGG SER QL IA: REACTIVE
HBV CORE AB SERPL QL IA: NONREACTIVE
HBV SURFACE AG SERPL QL IA: NONREACTIVE
INR PPP: 0.96 (ref 0.85–1.15)
PROT SERPL-MCNC: 7.9 G/DL (ref 6.8–8.8)

## 2022-05-19 PROCEDURE — 86706 HEP B SURFACE ANTIBODY: CPT | Mod: 90 | Performed by: PATHOLOGY

## 2022-05-19 PROCEDURE — 36415 COLL VENOUS BLD VENIPUNCTURE: CPT | Performed by: PATHOLOGY

## 2022-05-19 PROCEDURE — 80076 HEPATIC FUNCTION PANEL: CPT | Performed by: PATHOLOGY

## 2022-05-19 PROCEDURE — 86708 HEPATITIS A ANTIBODY: CPT | Mod: 90 | Performed by: PATHOLOGY

## 2022-05-19 PROCEDURE — G0463 HOSPITAL OUTPT CLINIC VISIT: HCPCS

## 2022-05-19 PROCEDURE — 99204 OFFICE O/P NEW MOD 45 MIN: CPT | Mod: GC | Performed by: INTERNAL MEDICINE

## 2022-05-19 PROCEDURE — 85610 PROTHROMBIN TIME: CPT | Performed by: PATHOLOGY

## 2022-05-19 PROCEDURE — 86704 HEP B CORE ANTIBODY TOTAL: CPT | Mod: 90 | Performed by: PATHOLOGY

## 2022-05-19 PROCEDURE — 87340 HEPATITIS B SURFACE AG IA: CPT | Mod: 90 | Performed by: PATHOLOGY

## 2022-05-19 PROCEDURE — 99000 SPECIMEN HANDLING OFFICE-LAB: CPT | Performed by: PATHOLOGY

## 2022-05-19 RX ORDER — GLECAPREVIR AND PIBRENTASVIR 40; 100 MG/1; MG/1
3 TABLET, FILM COATED ORAL
Qty: 168 TABLET | Refills: 0 | Status: SHIPPED | OUTPATIENT
Start: 2022-05-19 | End: 2022-05-19 | Stop reason: DRUGHIGH

## 2022-05-19 ASSESSMENT — PAIN SCALES - GENERAL: PAINLEVEL: NO PAIN (0)

## 2022-05-19 NOTE — LETTER
5/19/2022         RE: Promise Nunez  [address]  [facility] [address]        Dear Colleague,    Thank you for referring your patient, Promise Nunez, to the [facility] [place]. Please see a copy of my visit note below.    Hepatology CLINIC VISIT    CC/REFERRING MD:  Roxanna Jones  REASON FOR CONSULTATION: Hepatitis C    ASSESSMENT/PLAN:  61 y/o F who is otherwise healthy who presents with a new diagnosis of hepatitis C.     #. Hepatitis C, treatment native, no evidence of cirrhosis  Viral load: 2,652,655 in May 2022; pending genotype. Platelets > 150, not suggestive of portal HTN. CT abdomen 9/2021 without evidence of cirrhosis or splenomegaly. No prior treatment. Unclear source of hepatitis C infection        PLAN:  - Plan for hepatitis B labs (s Ag, s Ab and c Ab today) along with hepatitis A IgG & INR  - Plan for 8 weeks of Mayvret treatment following by HCV RNA following treatment to assess for SVR. If treatment has been completed and SVR achieved, no need for follow up in liver clinic given no evidence of fibrosis or cirrhosis   * Interactions with her current medications were assessed; none found    Health Care Maintenance:  Vaccinations/Immunity:     - Hepatitis A: Pending  - Hepatitis B: Pending  - Recommend yearly influenza vaccination    Colon Cancer Screening:  - Colonoscopy 9/2021: Normal, no polyps; repeat CRC screening 2031    Return to clinic: PRN based on HCV treatment course    Discussed with hepatology attending, Dr. Espino who agrees with the above assessment and plan.    Page Raya MD  Gastroenterology and Hepatology Fellow  Pager: [phone]    HPI:   Currently,   - Doing well without complaints  - No symptoms of liver decompensation such as jaundice, abdominal swelling, confusion or bleeding  - Unclear where she acquired HCV; denies any blood transfusions,  experience, tattoos, drug use or surgeries. Received vaccinations as a  child in [place] before she came to the [place] in [place]    PERTINENT PAST MEDICAL HISTORY:  Past Medical History:   Diagnosis Date     Abnormal echocardiogram     She had CT coronary angiography which was normal     GERD (gastroesophageal reflux disease)      Screen for colon cancer 09/2021    nl       PREVIOUS SURGERIES:  Past Surgical History:   Procedure Laterality Date     COLONOSCOPY N/A 9/22/2021    Procedure: COLONOSCOPY;  Surgeon: Edwina Smith MD;  Location:  GI     None       ROS:  10 point ROS was conducted. Pertinent positives and negatives as above.    ALLERGIES:  Allergies   Allergen Reactions     No Known Allergies        PERTINENT MEDICATIONS:  Outpatient Encounter Medications as of 5/19/2022   Medication Sig Dispense Refill     cyclobenzaprine (FLEXERIL) 5 MG tablet Take 1 tablet (5 mg) by mouth daily as needed for muscle spasms 30 tablet 1     glecaprevir-pibrentasvir (MAVYRET) 100-40 MG per tablet Take 3 tablets by mouth daily (with breakfast) 84 tablet 1     multivitamin w/minerals (THERA-VIT-M) tablet Take 1 tablet by mouth daily       valACYclovir (VALTREX) 500 MG tablet Take 1 tablet (500 mg) by mouth daily Take 500 mg by mouth daily 30 tablet 5     [DISCONTINUED] glecaprevir-pibrentasvir (MAVYRET) 100-40 MG per tablet Take 3 tablets by mouth daily (with breakfast) 168 tablet 0     Facility-Administered Encounter Medications as of 5/19/2022   Medication Dose Route Frequency Provider Last Rate Last Admin     ondansetron (ZOFRAN) 2 MG/ML injection                 SOCIAL HISTORY:  Social History     Socioeconomic History     Marital status:      Spouse name: Not on file     Number of children: Not on file     Years of education: Not on file     Highest education level: Not on file   Occupational History     Not on file   Tobacco Use     Smoking status: Never Smoker     Smokeless tobacco: Never Used   Substance and Sexual Activity     Alcohol use: No     Alcohol/week: 0.0 standard  drinks     Drug use: No     Sexual activity: Yes     Partners: Male   Other Topics Concern     Parent/sibling w/ CABG, MI or angioplasty before 65F 55M? Not Asked   Social History Narrative    ** Merged History Encounter **          Social Determinants of Health     Financial Resource Strain: Not on file   Food Insecurity: Not on file   Transportation Needs: Not on file   Physical Activity: Not on file   Stress: Not on file   Social Connections: Not on file   Intimate Partner Violence: Not on file   Housing Stability: Not on file   -   - Moved to the  from [place] in 2005  - Denies tobacco or drugs-  - Intermittent small amounts of alcohol use, 1 drink at a time    FAMILY HISTORY:  Family History   Problem Relation Age of Onset     Hypertension Mother      Diabetes Mother      Diabetes Father    - No family history of liver disease or colon cancer    PHYSICAL EXAMINATION:  Vitals reviewed: /73   Pulse 75   Temp 97.9  F (36.6  C) (Oral)   Wt 52.4 kg (115 lb 9.6 oz)   SpO2 99%   BMI 21.49 kg/m    Wt:   Wt Readings from Last 2 Encounters:   05/19/22 52.4 kg (115 lb 9.6 oz)   05/16/22 52.2 kg (115 lb)      Constitutional: Cooperative, pleasant, no acute distress  Eyes: Conjunctiva anicteric  HEENT: Mask in place  CV: No edema  Respiratory: Unlabored breathing on ambient air  Abd: Nondistended, +bs, no hepatosplenomegaly, nontender, no peritoneal signs  Skin: No jaundice. No spider angiomata. No palmar erythema.   Psych: Normal affect  Neurologic: A&Ox3. No asterixis     PERTINENT STUDIES:  BMP  Recent Labs   Lab Test 05/16/22  1108 09/08/21  0208 09/06/21  1557 08/20/21  1808    135 134 135   POTASSIUM 4.6 3.6 4.3 4.1   CHLORIDE 109 107 105 105   MERYL 9.2 8.6 8.6 8.6   CO2 25 26 27 24   BUN 13 9 6* 13   CR 0.57 0.60 0.60 0.67   GLC 90 92 96 83     CBC  Recent Labs   Lab Test 05/16/22  1108 09/08/21  0208 09/06/21  1557 08/20/21  1808   WBC 4.7 6.1 7.8 3.8*   RBC 4.83 4.65 4.63 5.21*   HGB 14.9  13.8 13.9 16.1*   HCT 44.3 41.6 42.7 46.0   MCV 92 90 92 88   MCH 30.8 29.7 30.0 30.9   MCHC 33.6 33.2 32.6 35.0   RDW 14.0 13.2 13.8 13.8    217 184 157     Liver Enzymes   Recent Labs   Lab Test 09/08/21  0208   PROTTOTAL 7.9   ALBUMIN 3.4   BILITOTAL 0.5   ALKPHOS 65   AST 23   ALT 39      PHQ:   PHQ 5/16/2022   PHQ-9 Total Score 0   Q9: Thoughts of better off dead/self-harm past 2 weeks Not at all      ENDOSCOPY:  Colonoscopy 9/2021: Entire colon is normal.     IMAGING/STUDIES:   CT Abdomen Pelvis w/contrast 9/2021  1.  Diffuse low-density wall thickening throughout the rectosigmoid colon with increased mucosal enhancement. Findings compatible with a nonspecific proctitis/colitis. Clinical correlation.  2.  No definite acute findings elsewhere.  3.  Cholelithiasis. No biliary dilatation.  SPLEEN: Normal.    Attestation signed by Alton Espino MD at 5/20/2022  9:36 AM:  The patient was seen and examined.  The above assessment and plan was developed jointly with the fellow.      Thank you very much for allowing me to participate in the care of this patient.  If you have any questions regarding my recommendations, please do not hesitate to contact me.         Alton Espino MD      Professor of Medicine  [employer]      Executive Medical Director, Solid Organ Transplant Program  [facility]

## 2022-05-19 NOTE — NURSING NOTE
Chief Complaint   Patient presents with     New Patient       /73   Pulse 75   Temp 97.9  F (36.6  C) (Oral)   Wt 52.4 kg (115 lb 9.6 oz)   SpO2 99%   BMI 21.49 kg/m      James Herrera MA on 5/19/2022 at 10:49 AM

## 2022-05-19 NOTE — PATIENT INSTRUCTIONS
It was a pleasure taking care of you today.  I've included a brief summary of our discussion and care plan from today's visit below.  Please review this information with your primary care provider.  _______________________________________________________________________    My recommendations are summarized as follows:  - Plan for labs today  - Plan to start you on Mayvret x 8 weeks for treatment of hepatitis C; our team will contact you regarding starting this    Return to Liver/Hepatology Clinic in 3 months.      _______________________________________________________________________    Scheduling Procedures or Tests  - To schedule endoscopic procedures call: [phone]  - To schedule radiology tests call: [phone] (for [facility]) or [phone] (for [facility])     _______________________________________________________________________    Who do I call with any questions after my visit?  Please be in touch if there are any further questions that arise following today's visit.  There are multiple ways to contact your gastroenterology care team.      To schedule or reschedule an appointment, please call [phone] and choose option 2 (for hepatology) and then option 1 (for scheduling).    During business hours, you may reach a nurse by calling the appointment line ([phone]) and asking to speak with a nurse    You can send a secure message through [employer] for non-urgent questions. [employer] messages are answered by your nurse or doctor typically within 24 to 48 hours. Please allow extra time on weekends and holidays.      For urgent/emergent questions after business hours, you may reach the on-call GI Fellow by contacting the [facility]  at [phone].     How will I get the results of any tests ordered?    You will receive all of your results.  If you have signed up for MyChart, any tests ordered at your visit will be available to you after your physician reviews them.   Typically this takes 1-2 weeks.  If there are urgent results that require a change in your care plan, your physician or nurse will call you to discuss the next steps.      What is [employer]?  [employer] is a secure way for you to access all of your healthcare records from the [facility].  It is a web based computer program, so you can sign on to it from any location.  It also allows you to send secure messages to your care team.  I recommend signing up for [employer] access if you have not already done so and are comfortable with using a computer.      How to I schedule a follow-up visit?  If you did not schedule a follow-up visit today, please call [phone] to schedule a follow-up office visit.     Sincerely,    Page Raya (Lizzie)

## 2022-05-20 LAB — HBV SURFACE AB SERPL IA-ACNC: 8.89 M[IU]/ML

## 2022-05-26 ENCOUNTER — TELEPHONE (OUTPATIENT)
Dept: GASTROENTEROLOGY | Facility: CLINIC | Age: 62
End: 2022-05-26
Payer: COMMERCIAL

## 2022-05-26 LAB — HCV GENTYP SERPL NAA+PROBE: NORMAL

## 2022-05-26 NOTE — TELEPHONE ENCOUNTER
[facility] Call Center    Phone Message    May a detailed message be left on voicemail: yes     Reason for Call: Other:      Pt is requesting a call back to discuss their recent lab results. Pt stated they were previously diagnosed with hepatitis C by prior labs ordered by their PCP. Pt stated that they did not see that they were positive for hepatitis C in their most recent lab results ordered by Dr. Raya. Pt stated they were wondering why they did not test positive for hepatitis C.    Action Taken: Message routed to:  [facility] Hep    Travel Screening: Not Applicable

## 2022-05-26 NOTE — TELEPHONE ENCOUNTER
Connected with pt and discussed that pt was not retested for hepatitis C as she had just had these labs completed on 5/16. Reviewed that pt had hepatitis b and A labs drawn. Notified pt that she does not have hepatitis b and pt was exposed to hepatitis A and now is immune which is a good thing.     Discussed that Dr. Raya prescribed a medication called Mavyret for hepatitis C and the clinic is waiting on authorization from pt's insurance. Reviewed that hepatology clinic will reach out to pt once prescription is approved.     Pt verbalized understanding and is agreeable to plan of care.

## 2022-06-24 ENCOUNTER — CARE COORDINATION (OUTPATIENT)
Dept: GASTROENTEROLOGY | Facility: CLINIC | Age: 62
End: 2022-06-24

## 2022-06-24 DIAGNOSIS — B18.2 CHRONIC HEPATITIS C WITHOUT HEPATIC COMA (H): Primary | ICD-10-CM

## 2022-06-24 NOTE — PROGRESS NOTES
Connected with patient for f/u on Hep C treatment delivery/start status. Patient received their Mavyret medication and are ready to start treatment. Patient will be on Hep C treatment for 8 weeks. Patient reports no recent changes in health, hospitalizations or recent changes in medications. Patient did discuss with a pharmacist. Reviewed the following Hep C POC and education with patient.     Hepatitis C Treatment  Treatment: Mavyret x 8 weeks  Genotype: 1b  Stage Fibrosis: no biochemical or physical signs of cirrhosis  Previous Treatment Outcome: Naive    Please have labs drawn as close to the date indicated at an [facility].    Start Date: 07/07/22    3 Months Post Treatment  HCV RNA Quant Lab due: 11/30/2022  Please have this lab drawn on the specified date of 11/17/2022 or within a week after. This final lab will determine if you have cleared the Hepatitis C virus with Mavyret treatment. Without this final lab, we will be unable to determine if treatment was successful. You do not need to fast for this lab.     Educational information to patient on Hep C treatment;     -Contact the [facility] Hepatology clinic and speak with clinical RN prior to starting any new prescribed or OTC medications.   -Take medications exactly as prescribed, do not change dose or stop taking without consulting your provider.   -Take Medication one time each day with  food  -If you miss a dose of medication, then take it as soon as you remember on the same day. If not remembered on the same day, then skip the dose and take the next dose at the usual time. Do not take more than the recommended dose. Contact the clinic if you miss a dose.    Please contact the pharmacy 1-2 weeks prior to needing a medication refill.      Side Effects  The most common side effects of Hep C medication treatment can include:  -tiredness  -headache  Notify the clinic of any side effects that bother you or that do not go away.   Possible side effects  have been discussed.   Patient has been instructed to clinic for rash, itching or unmanageable nausea.    How to store Hep C Treatment Medications  -Store Medication at room temperature below 86 degrees F  -Keep Medication in it's original container  -Do not use Medication if the seal is broken or missing    General information  It is not known if treatment will prevent you from infecting another person or reinfecting yourself with the hepatitis C virus during treatment. It is best that as soon as you start treatment to buy a new toothbrush, disposable razors (if you use a rotating shaver you do not need to buy a new one) and nail clippers. If you wear dentures, you should soak your dentures in 70-90% Isopropyl solution for 5 minutes one time within the first week of starting treatment. After dentures are done soaking, rinse your dentures off thoroughly with water. If you check your blood sugar at home, please dispose of the fingerstick needle after each use and DO NOT REUSE the insulin needles. These items should not be shared with anyone.        If you have any questions, please contact the main clinic at [phone] or your clinical RN at [phone]. We appreciate you choosing the [facility] for your treatment. Patient agrees to Hep C treatment POC and verbalizes understanding. Patient will receive a copy of treatment plan in the mail, address verified with patient. Patient has no further questions or concerns. Hep C care team updated on patient status.      Debbie Ventura RN Care Coordinator   [facility]  Hepatology Clinic/Specialty Program

## 2022-06-24 NOTE — LETTER
June 24, 2022       TO: Promise Nunez  [address]  [facility] [address]       Dear ky,    Hepatitis C Treatment    Treatment: Mavyret x 8 weeks    Please have labs drawn as close to the date indicated at an [facility].    Start Date: 06/24/22    3 Months Post Treatment  HCV RNA Quant Lab due: 11/17/2022  Please have this lab drawn on the specified date of 11/17/2022 or within a week after. This final lab will determine if you have cleared the Hepatitis C virus with Mavyret treatment. Without this final lab, we will be unable to determine if treatment was successful. You do not need to fast for this lab.     Educational information to patient on Hep C treatment;     -Contact the [facility] Hepatology clinic and speak with clinical RN prior to starting any new prescribed or OTC medications.   -Take medications exactly as prescribed, do not change dose or stop taking without consulting your provider.   -Take Medication one time each day with  food  -If you miss a dose of medication, then take it as soon as you remember on the same day. If not remembered on the same day, then skip the dose and take the next dose at the usual time. Do not take more than the recommended dose. Contact the clinic if you miss a dose.    Please contact the pharmacy 1-2 weeks prior to needing a medication refill.      Side Effects  The most common side effects of Hep C medication treatment can include:  -tiredness  -headache  -nausea  Notify the clinic of any side effects that bother you or that do not go away.   Possible side effects have been discussed.   Patient has been instructed to clinic for rash, itching or unmanageable nausea.    How to store Hep C Treatment Medications  -Store Medication at room temperature below 86 degrees F  -Keep Medication in it's original container  -Do not use Medication if the seal is broken or missing    General information  It is not known if treatment will prevent you  from infecting another person or reinfecting yourself with the hepatitis C virus during treatment. It is best that as soon as you start treatment to buy a new toothbrush, disposable razors (if you use a rotating shaver you do not need to buy a new one) and nail clippers. If you wear dentures, you should soak your dentures in 70-90% Isopropyl solution for 5 minutes one time within the first week of starting treatment. After dentures are done soaking, rinse your dentures off thoroughly with water. If you check your blood sugar at home, please dispose of the fingerstick needle after each use and DO NOT REUSE the insulin needles. These items should not be shared with anyone.        If you have any questions, please contact the main clinic at [phone] or your clinical RN at [phone]. We appreciate you choosing the [facility] clinic for your treatment.    Debbie Ventura RN Care Coordinator   [facility]  Hepatology Clinic/Specialty Program

## 2022-07-05 NOTE — PROGRESS NOTES
Hepatology CLINIC VISIT    CC/REFERRING MD:  Roxanna Jones  REASON FOR CONSULTATION: Hepatitis C    ASSESSMENT/PLAN:  61 y/o F who is otherwise healthy who presents with a new diagnosis of hepatitis C.     #. Hepatitis C, genotype 1b, treatment native, no evidence of cirrhosis  Viral load: 2,652,655 in May 2022;. Platelets > 150, not suggestive of portal HTN. CT abdomen 9/2021 without evidence of cirrhosis or splenomegaly. No prior treatment. Unclear source of hepatitis C infection        PLAN:  - Plan for 8 weeks of Mayvret treatment following by HCV RNA following treatment to assess for SVR. If treatment has been completed and SVR achieved, no need for follow up in liver clinic given no evidence of fibrosis or cirrhosis   * She will start her treatment today   * Interactions with her current medications were assessed; none found    Health Care Maintenance:  Vaccinations/Immunity:     - Hepatitis A: Immune, Hep A IgG +   - Hepatitis B: Immune; HBsAb 8.89  - Recommend yearly influenza vaccination    Colon Cancer Screening:  - Colonoscopy 9/2021: Normal, no polyps; repeat CRC screening 2031    Return to clinic: PRN based on HCV treatment course    Discussed with hepatology attending, Dr. Espino who agrees with the above assessment and plan.    The patient was seen and examined.  The above assessment and plan was developed jointly with the fellow.      Thank you very much for allowing me to participate in the care of this patient.  If you have any questions regarding my recommendations, please do not hesitate to contact me.         Alton Espino MD      Professor of Medicine  [employer]      Executive Medical Director, Solid Organ Transplant Program  [facility]    Page Raya MD  Gastroenterology and Hepatology Fellow  Pager: [phone]    HPI:   Currently,   - Doing well without any symptoms; presented to this appointment to get her questions addressed  about treatment  - No symptoms of liver decompensation such as jaundice, abdominal swelling, confusion or bleeding  - Unclear where she acquired HCV; denies any blood transfusions,  experience, tattoos, drug use or surgeries. Received vaccinations as a child in [place] before she came to the  in 2005    PERTINENT PAST MEDICAL HISTORY:  Past Medical History:   Diagnosis Date     Abnormal echocardiogram     She had CT coronary angiography which was normal     GERD (gastroesophageal reflux disease)      Screen for colon cancer 09/2021    nl       PREVIOUS SURGERIES:  Past Surgical History:   Procedure Laterality Date     COLONOSCOPY N/A 9/22/2021    Procedure: COLONOSCOPY;  Surgeon: Edwina Smith MD;  Location:  GI     None       ROS:  10 point ROS was conducted. Pertinent positives and negatives as above.    ALLERGIES:  Allergies   Allergen Reactions     No Known Allergies      PERTINENT MEDICATIONS:  Outpatient Encounter Medications as of 7/7/2022   Medication Sig Dispense Refill     cyclobenzaprine (FLEXERIL) 5 MG tablet Take 1 tablet (5 mg) by mouth daily as needed for muscle spasms 30 tablet 1     glecaprevir-pibrentasvir (MAVYRET) 100-40 MG per tablet Take 3 tablets by mouth daily (with breakfast) 84 tablet 1     multivitamin w/minerals (THERA-VIT-M) tablet Take 1 tablet by mouth daily       valACYclovir (VALTREX) 500 MG tablet Take 1 tablet (500 mg) by mouth daily Take 500 mg by mouth daily 30 tablet 5     Facility-Administered Encounter Medications as of 7/7/2022   Medication Dose Route Frequency Provider Last Rate Last Admin     ondansetron (ZOFRAN) 2 MG/ML injection                 SOCIAL HISTORY:  Social History     Socioeconomic History     Marital status:      Spouse name: Not on file     Number of children: Not on file     Years of education: Not on file     Highest education level: Not on file   Occupational History     Not on file   Tobacco Use     Smoking status: Never  Smoker     Smokeless tobacco: Never Used   Substance and Sexual Activity     Alcohol use: No     Alcohol/week: 0.0 standard drinks     Drug use: No     Sexual activity: Yes     Partners: Male   Other Topics Concern     Parent/sibling w/ CABG, MI or angioplasty before 65F 55M? Not Asked   Social History Narrative    ** Merged History Encounter **          Social Determinants of Health     Financial Resource Strain: Not on file   Food Insecurity: Not on file   Transportation Needs: Not on file   Physical Activity: Not on file   Stress: Not on file   Social Connections: Not on file   Intimate Partner Violence: Not on file   Housing Stability: Not on file   -   - Moved to the  from [place] in 2005  - Denies tobacco or drugs  - Intermittent small amounts of alcohol use, 1 drink at a time    FAMILY HISTORY:  Family History   Problem Relation Age of Onset     Hypertension Mother      Diabetes Mother      Diabetes Father    - No family history of liver disease or colon cancer    PHYSICAL EXAMINATION:  Vitals reviewed: /75   Pulse 69   Wt 52.6 kg (116 lb)   SpO2 100%   BMI 21.56 kg/m    Wt:   Wt Readings from Last 2 Encounters:   07/07/22 52.6 kg (116 lb)   05/19/22 52.4 kg (115 lb 9.6 oz)      Constitutional: Cooperative, pleasant, no acute distress  Eyes: Conjunctiva anicteric  HEENT: Mask in place  CV: No edema  Respiratory: Unlabored breathing on ambient air  Abd: Nondistended, +bs, no hepatosplenomegaly, nontender, no peritoneal signs  Skin: No jaundice. No spider angiomata. No palmar erythema.   Psych: Normal affect  Neurologic: A&Ox3. No asterixis     PERTINENT STUDIES:  BMP  Recent Labs   Lab Test 05/16/22  1108 09/08/21  0208 09/06/21  1557 08/20/21  1808    135 134 135   POTASSIUM 4.6 3.6 4.3 4.1   CHLORIDE 109 107 105 105   MERYL 9.2 8.6 8.6 8.6   CO2 25 26 27 24   BUN 13 9 6* 13   CR 0.57 0.60 0.60 0.67   GLC 90 92 96 83     CBC  Recent Labs   Lab Test 05/16/22  1108 09/08/21  0208  09/06/21  1557 08/20/21  1808   WBC 4.7 6.1 7.8 3.8*   RBC 4.83 4.65 4.63 5.21*   HGB 14.9 13.8 13.9 16.1*   HCT 44.3 41.6 42.7 46.0   MCV 92 90 92 88   MCH 30.8 29.7 30.0 30.9   MCHC 33.6 33.2 32.6 35.0   RDW 14.0 13.2 13.8 13.8    217 184 157     Liver Function Studies - Recent Labs   Lab Test 05/19/22  1152   PROTTOTAL 7.9   ALBUMIN 4.0   BILITOTAL 0.7   ALKPHOS 77   AST 34   ALT 39     PHQ:   PHQ 5/16/2022   PHQ-9 Total Score 0   Q9: Thoughts of better off dead/self-harm past 2 weeks Not at all      ENDOSCOPY:  Colonoscopy 9/2021: Entire colon is normal.     IMAGING/STUDIES:   CT Abdomen Pelvis w/contrast 9/2021  1.  Diffuse low-density wall thickening throughout the rectosigmoid colon with increased mucosal enhancement. Findings compatible with a nonspecific proctitis/colitis. Clinical correlation.  2.  No definite acute findings elsewhere.  3.  Cholelithiasis. No biliary dilatation.  SPLEEN: Normal.

## 2022-07-07 ENCOUNTER — OFFICE VISIT (OUTPATIENT)
Dept: GASTROENTEROLOGY | Facility: CLINIC | Age: 62
End: 2022-07-07
Attending: INTERNAL MEDICINE
Payer: COMMERCIAL

## 2022-07-07 VITALS
HEART RATE: 69 BPM | SYSTOLIC BLOOD PRESSURE: 117 MMHG | OXYGEN SATURATION: 100 % | WEIGHT: 116 LBS | DIASTOLIC BLOOD PRESSURE: 75 MMHG | BODY MASS INDEX: 21.56 KG/M2

## 2022-07-07 DIAGNOSIS — B18.2 CHRONIC HEPATITIS C WITHOUT HEPATIC COMA (H): Primary | ICD-10-CM

## 2022-07-07 PROCEDURE — 99214 OFFICE O/P EST MOD 30 MIN: CPT | Mod: GC | Performed by: INTERNAL MEDICINE

## 2022-07-07 ASSESSMENT — PAIN SCALES - GENERAL: PAINLEVEL: NO PAIN (0)

## 2022-07-07 NOTE — LETTER
7/7/2022         RE: Promise Nunez  [address]  [facility] [address]        Dear Colleague,    Thank you for referring your patient, Promise Nunez, to the [facility] [place]. Please see a copy of my visit note below.    Hepatology CLINIC VISIT    CC/REFERRING MD:  Roxanna Jones  REASON FOR CONSULTATION: Hepatitis C    ASSESSMENT/PLAN:  61 y/o F who is otherwise healthy who presents with a new diagnosis of hepatitis C.     #. Hepatitis C, genotype 1b, treatment native, no evidence of cirrhosis  Viral load: 2,652,655 in May 2022;. Platelets > 150, not suggestive of portal HTN. CT abdomen 9/2021 without evidence of cirrhosis or splenomegaly. No prior treatment. Unclear source of hepatitis C infection        PLAN:  - Plan for 8 weeks of Mayvret treatment following by HCV RNA following treatment to assess for SVR. If treatment has been completed and SVR achieved, no need for follow up in liver clinic given no evidence of fibrosis or cirrhosis   * She will start her treatment today   * Interactions with her current medications were assessed; none found    Health Care Maintenance:  Vaccinations/Immunity:     - Hepatitis A: Immune, Hep A IgG +   - Hepatitis B: Immune; HBsAb 8.89  - Recommend yearly influenza vaccination    Colon Cancer Screening:  - Colonoscopy 9/2021: Normal, no polyps; repeat CRC screening 2031    Return to clinic: PRN based on HCV treatment course    Discussed with hepatology attending, Dr. Espino who agrees with the above assessment and plan.    The patient was seen and examined.  The above assessment and plan was developed jointly with the fellow.      Thank you very much for allowing me to participate in the care of this patient.  If you have any questions regarding my recommendations, please do not hesitate to contact me.         Alton Espino MD      Professor of Medicine  [employer]      Executive Medical Director,  Solid Organ Transplant Program  [facility]    Page Raya MD  Gastroenterology and Hepatology Fellow  Pager: [phone]    HPI:   Currently,   - Doing well without any symptoms; presented to this appointment to get her questions addressed about treatment  - No symptoms of liver decompensation such as jaundice, abdominal swelling, confusion or bleeding  - Unclear where she acquired HCV; denies any blood transfusions,  experience, tattoos, drug use or surgeries. Received vaccinations as a child in [place] before she came to the  in 2005    PERTINENT PAST MEDICAL HISTORY:  Past Medical History:   Diagnosis Date     Abnormal echocardiogram     She had CT coronary angiography which was normal     GERD (gastroesophageal reflux disease)      Screen for colon cancer 09/2021    nl       PREVIOUS SURGERIES:  Past Surgical History:   Procedure Laterality Date     COLONOSCOPY N/A 9/22/2021    Procedure: COLONOSCOPY;  Surgeon: Edwina Smith MD;  Location:  GI     None       ROS:  10 point ROS was conducted. Pertinent positives and negatives as above.    ALLERGIES:  Allergies   Allergen Reactions     No Known Allergies      PERTINENT MEDICATIONS:  Outpatient Encounter Medications as of 7/7/2022   Medication Sig Dispense Refill     cyclobenzaprine (FLEXERIL) 5 MG tablet Take 1 tablet (5 mg) by mouth daily as needed for muscle spasms 30 tablet 1     glecaprevir-pibrentasvir (MAVYRET) 100-40 MG per tablet Take 3 tablets by mouth daily (with breakfast) 84 tablet 1     multivitamin w/minerals (THERA-VIT-M) tablet Take 1 tablet by mouth daily       valACYclovir (VALTREX) 500 MG tablet Take 1 tablet (500 mg) by mouth daily Take 500 mg by mouth daily 30 tablet 5     Facility-Administered Encounter Medications as of 7/7/2022   Medication Dose Route Frequency Provider Last Rate Last Admin     ondansetron (ZOFRAN) 2 MG/ML injection                 SOCIAL HISTORY:  Social History      Socioeconomic History     Marital status:      Spouse name: Not on file     Number of children: Not on file     Years of education: Not on file     Highest education level: Not on file   Occupational History     Not on file   Tobacco Use     Smoking status: Never Smoker     Smokeless tobacco: Never Used   Substance and Sexual Activity     Alcohol use: No     Alcohol/week: 0.0 standard drinks     Drug use: No     Sexual activity: Yes     Partners: Male   Other Topics Concern     Parent/sibling w/ CABG, MI or angioplasty before 65F 55M? Not Asked   Social History Narrative    ** Merged History Encounter **          Social Determinants of Health     Financial Resource Strain: Not on file   Food Insecurity: Not on file   Transportation Needs: Not on file   Physical Activity: Not on file   Stress: Not on file   Social Connections: Not on file   Intimate Partner Violence: Not on file   Housing Stability: Not on file   -   - Moved to the  from [place] in 2005  - Denies tobacco or drugs  - Intermittent small amounts of alcohol use, 1 drink at a time    FAMILY HISTORY:  Family History   Problem Relation Age of Onset     Hypertension Mother      Diabetes Mother      Diabetes Father    - No family history of liver disease or colon cancer    PHYSICAL EXAMINATION:  Vitals reviewed: /75   Pulse 69   Wt 52.6 kg (116 lb)   SpO2 100%   BMI 21.56 kg/m    Wt:   Wt Readings from Last 2 Encounters:   07/07/22 52.6 kg (116 lb)   05/19/22 52.4 kg (115 lb 9.6 oz)      Constitutional: Cooperative, pleasant, no acute distress  Eyes: Conjunctiva anicteric  HEENT: Mask in place  CV: No edema  Respiratory: Unlabored breathing on ambient air  Abd: Nondistended, +bs, no hepatosplenomegaly, nontender, no peritoneal signs  Skin: No jaundice. No spider angiomata. No palmar erythema.   Psych: Normal affect  Neurologic: A&Ox3. No asterixis     PERTINENT STUDIES:  BMP  Recent Labs   Lab Test 05/16/22  1108 09/08/21  0208  09/06/21  1557 08/20/21  1808    135 134 135   POTASSIUM 4.6 3.6 4.3 4.1   CHLORIDE 109 107 105 105   MERYL 9.2 8.6 8.6 8.6   CO2 25 26 27 24   BUN 13 9 6* 13   CR 0.57 0.60 0.60 0.67   GLC 90 92 96 83     CBC  Recent Labs   Lab Test 05/16/22  1108 09/08/21  0208 09/06/21  1557 08/20/21  1808   WBC 4.7 6.1 7.8 3.8*   RBC 4.83 4.65 4.63 5.21*   HGB 14.9 13.8 13.9 16.1*   HCT 44.3 41.6 42.7 46.0   MCV 92 90 92 88   MCH 30.8 29.7 30.0 30.9   MCHC 33.6 33.2 32.6 35.0   RDW 14.0 13.2 13.8 13.8    217 184 157     Liver Function Studies - Recent Labs   Lab Test 05/19/22  1152   PROTTOTAL 7.9   ALBUMIN 4.0   BILITOTAL 0.7   ALKPHOS 77   AST 34   ALT 39     PHQ:   PHQ 5/16/2022   PHQ-9 Total Score 0   Q9: Thoughts of better off dead/self-harm past 2 weeks Not at all      ENDOSCOPY:  Colonoscopy 9/2021: Entire colon is normal.     IMAGING/STUDIES:   CT Abdomen Pelvis w/contrast 9/2021  1.  Diffuse low-density wall thickening throughout the rectosigmoid colon with increased mucosal enhancement. Findings compatible with a nonspecific proctitis/colitis. Clinical correlation.  2.  No definite acute findings elsewhere.  3.  Cholelithiasis. No biliary dilatation.  SPLEEN: Normal.      Again, thank you for allowing me to participate in the care of your patient.        Sincerely,        Page Raya MD

## 2022-07-07 NOTE — LETTER
Date:July 28, 2022      Patient was self referred, no letter generated. Do not send.        [facility] Health Information

## 2022-08-17 NOTE — PROGRESS NOTES
Pt left a voicemail stating that she recently tested positive for COVID. Attempted to reach patient for check in, no answer, message left requesting call back, number provided.    Received a message from Laura Hawley Specialty Pharmacist, stating that she spoke with pt and pt reported mild symptoms. Manuel reviewed with pt that Paxlovid cannot be taken with Mavyret and Manuel instructed pt to reach out to clinic as soon as possible if symptoms worsen. Pt was instructed to continue taking Mavyret until it is all gone.

## 2022-09-03 ENCOUNTER — HEALTH MAINTENANCE LETTER (OUTPATIENT)
Age: 62
End: 2022-09-03

## 2022-09-29 ENCOUNTER — OFFICE VISIT (OUTPATIENT)
Dept: URGENT CARE | Facility: URGENT CARE | Age: 62
End: 2022-09-29
Payer: COMMERCIAL

## 2022-09-29 VITALS
OXYGEN SATURATION: 99 % | SYSTOLIC BLOOD PRESSURE: 100 MMHG | HEART RATE: 66 BPM | BODY MASS INDEX: 21.56 KG/M2 | DIASTOLIC BLOOD PRESSURE: 68 MMHG | WEIGHT: 116 LBS | TEMPERATURE: 98.1 F

## 2022-09-29 DIAGNOSIS — L98.9 BUMPS ON SKIN: Primary | ICD-10-CM

## 2022-09-29 PROCEDURE — 99213 OFFICE O/P EST LOW 20 MIN: CPT | Performed by: PHYSICIAN ASSISTANT

## 2022-09-29 NOTE — PROGRESS NOTES
SUBJECTIVE:   Promise Nunez is a 62 year old female presenting with a chief complaint of   Chief Complaint   Patient presents with     Urgent Care     Finger     C/O lump on finger after a laceration 2 weeks ago       She is an established patient of [facility].  Patient presents with concerns of a lump on her finger at the site of laceration 2 weeks ago.  Left hand index finger DIP.  At times painful.  Patient cut with scissors while cutting ribbon.  No evaluation for laceration originally.          Review of Systems    Past Medical History:   Diagnosis Date     Abnormal echocardiogram     She had CT coronary angiography which was normal     GERD (gastroesophageal reflux disease)      Screen for colon cancer 09/2021    nl     Family History   Problem Relation Age of Onset     Hypertension Mother      Diabetes Mother      Diabetes Father      Current Outpatient Medications   Medication Sig Dispense Refill     cyclobenzaprine (FLEXERIL) 5 MG tablet Take 1 tablet (5 mg) by mouth daily as needed for muscle spasms (Patient not taking: Reported on 9/29/2022) 30 tablet 1     glecaprevir-pibrentasvir (MAVYRET) 100-40 MG per tablet Take 3 tablets by mouth daily (with breakfast) (Patient not taking: Reported on 9/29/2022) 84 tablet 1     multivitamin w/minerals (THERA-VIT-M) tablet Take 1 tablet by mouth daily (Patient not taking: Reported on 9/29/2022)       valACYclovir (VALTREX) 500 MG tablet Take 1 tablet (500 mg) by mouth daily Take 500 mg by mouth daily (Patient not taking: Reported on 9/29/2022) 30 tablet 5     Social History     Tobacco Use     Smoking status: Never Smoker     Smokeless tobacco: Never Used   Substance Use Topics     Alcohol use: No     Alcohol/week: 0.0 standard drinks       OBJECTIVE  /68   Pulse 66   Temp 98.1  F (36.7  C) (Tympanic)   Wt 52.6 kg (116 lb)   SpO2 99%   BMI 21.56 kg/m      Physical Exam  Vitals reviewed.   Constitutional:       Appearance: Normal appearance. She is normal  weight.   Eyes:      Extraocular Movements: Extraocular movements intact.      Conjunctiva/sclera: Conjunctivae normal.   Cardiovascular:      Rate and Rhythm: Normal rate.   Musculoskeletal:         General: Normal range of motion.   Skin:     Findings: No erythema.      Comments: Left hand index finger, DIP with two small papules, mildly tender, scar noted.  Completely healed, full  ROM.  No erythema, no warmth   Neurological:      Mental Status: She is alert.         Labs:  No results found for this or any previous visit (from the past 24 hour(s)).    X-Ray was not done.    ASSESSMENT:    No diagnosis found.     Medical Decision Making:    Differential Diagnosis:  Normal scar tissue, keloid, entrapped FB, wart    Serious Comorbid Conditions:  Adult:  reviewed    PLAN:    Patient reassurance.  Observation for now.  If worsens or persists, present for evaluation.    Followup:    If not improving or if condition worsens, follow up with your Primary Care Provider, If not improving or if conditions worsens over the next 12-24 hours, go to the Emergency Department    There are no Patient Instructions on file for this visit.

## 2022-11-17 ENCOUNTER — LAB (OUTPATIENT)
Dept: LAB | Facility: CLINIC | Age: 62
End: 2022-11-17
Payer: COMMERCIAL

## 2022-11-17 DIAGNOSIS — B18.2 CHRONIC HEPATITIS C WITHOUT HEPATIC COMA (H): ICD-10-CM

## 2022-11-17 PROCEDURE — 99000 SPECIMEN HANDLING OFFICE-LAB: CPT | Performed by: PATHOLOGY

## 2022-11-17 PROCEDURE — 87522 HEPATITIS C REVRS TRNSCRPJ: CPT | Mod: 90 | Performed by: PATHOLOGY

## 2022-11-17 PROCEDURE — 36415 COLL VENOUS BLD VENIPUNCTURE: CPT | Performed by: PATHOLOGY

## 2022-11-20 LAB — HCV RNA SERPL NAA+PROBE-ACNC: NOT DETECTED IU/ML

## 2022-12-12 ENCOUNTER — TRANSFERRED RECORDS (OUTPATIENT)
Dept: HEALTH INFORMATION MANAGEMENT | Facility: CLINIC | Age: 62
End: 2022-12-12

## 2023-02-24 ENCOUNTER — OFFICE VISIT (OUTPATIENT)
Dept: URGENT CARE | Facility: URGENT CARE | Age: 63
End: 2023-02-24
Payer: COMMERCIAL

## 2023-02-24 ENCOUNTER — ANCILLARY PROCEDURE (OUTPATIENT)
Dept: ULTRASOUND IMAGING | Facility: CLINIC | Age: 63
End: 2023-02-24
Attending: FAMILY MEDICINE
Payer: COMMERCIAL

## 2023-02-24 VITALS
HEART RATE: 77 BPM | OXYGEN SATURATION: 97 % | SYSTOLIC BLOOD PRESSURE: 120 MMHG | DIASTOLIC BLOOD PRESSURE: 75 MMHG | WEIGHT: 123.3 LBS | BODY MASS INDEX: 22.69 KG/M2 | RESPIRATION RATE: 19 BRPM | TEMPERATURE: 98.3 F | HEIGHT: 62 IN

## 2023-02-24 DIAGNOSIS — M79.661 RIGHT CALF PAIN: ICD-10-CM

## 2023-02-24 DIAGNOSIS — M79.661 RIGHT CALF PAIN: Primary | ICD-10-CM

## 2023-02-24 PROCEDURE — 99207 REFERRAL TO ACUTE AND DIAGNOSTIC SERVICES: CPT | Performed by: NURSE PRACTITIONER

## 2023-02-24 PROCEDURE — 93971 EXTREMITY STUDY: CPT | Mod: RT

## 2023-02-24 RX ORDER — ASPIRIN 325 MG
325 TABLET ORAL DAILY
COMMUNITY
End: 2023-03-03 | Stop reason: SINTOL

## 2023-02-24 RX ORDER — OMEGA-3 FATTY ACIDS/FISH OIL 300-1000MG
CAPSULE ORAL
COMMUNITY

## 2023-02-24 ASSESSMENT — PAIN SCALES - GENERAL: PAINLEVEL: MODERATE PAIN (4)

## 2023-03-03 ENCOUNTER — OFFICE VISIT (OUTPATIENT)
Dept: FAMILY MEDICINE | Facility: CLINIC | Age: 63
End: 2023-03-03
Payer: COMMERCIAL

## 2023-03-03 VITALS
SYSTOLIC BLOOD PRESSURE: 107 MMHG | TEMPERATURE: 97.8 F | HEART RATE: 71 BPM | RESPIRATION RATE: 16 BRPM | DIASTOLIC BLOOD PRESSURE: 72 MMHG | HEIGHT: 62 IN | OXYGEN SATURATION: 100 % | BODY MASS INDEX: 22.63 KG/M2 | WEIGHT: 123 LBS

## 2023-03-03 DIAGNOSIS — E78.5 HYPERLIPIDEMIA LDL GOAL <100: ICD-10-CM

## 2023-03-03 DIAGNOSIS — M79.661 RIGHT CALF PAIN: ICD-10-CM

## 2023-03-03 DIAGNOSIS — T14.8XXA HEMATOMA: Primary | ICD-10-CM

## 2023-03-03 DIAGNOSIS — M79.645 PAIN OF FINGER OF LEFT HAND: ICD-10-CM

## 2023-03-03 DIAGNOSIS — K21.9 GASTROESOPHAGEAL REFLUX DISEASE, UNSPECIFIED WHETHER ESOPHAGITIS PRESENT: ICD-10-CM

## 2023-03-03 PROCEDURE — 99214 OFFICE O/P EST MOD 30 MIN: CPT | Performed by: INTERNAL MEDICINE

## 2023-03-03 ASSESSMENT — PAIN SCALES - GENERAL: PAINLEVEL: NO PAIN (0)

## 2023-03-03 NOTE — PROGRESS NOTES
Subjective   Promise is a 62 year old accompanied by her spouse, presenting for the following health issues:  ER F/U and Hand Pain (2nd digit pain,sensitive to touch pt did see Derm)      History of Present Illness       Reason for visit:  Leg and finger    She eats 2-3 servings of fruits and vegetables daily.She consumes 0 sweetened beverage(s) daily.She exercises with enough effort to increase her heart rate 10 to 19 minutes per day.  She exercises with enough effort to increase her heart rate 3 or less days per week.   She is taking medications regularly.       ED/UC Followup:    Facility:  [facility]    Date of visit: 2/24/23  Reason for visit: Leg Pain (right)  Current Status: No pain tenderness    Chief Complaint:     Post UC discharge follow up of right calf pains, hematoma    HPI:   Patient Promise Nunez is a very pleasant 62 year old female with history of GERD, hyperlipidemia who presents to Internal Medicine clinic today for post UC discharge follow up of right calf pains, hematoma. Calf pains still present. Ultrasound on 2/24/23 shows no DVT, presence of a hematoma.           Current Medications:     Current Outpatient Medications   Medication Sig Dispense Refill     omega 3 1000 MG CAPS        valACYclovir (VALTREX) 500 MG tablet Take 1 tablet (500 mg) by mouth daily Take 500 mg by mouth daily 30 tablet 5         Allergies:      Allergies   Allergen Reactions     No Known Allergies             Past Medical History:     Past Medical History:   Diagnosis Date     Abnormal echocardiogram     She had CT coronary angiography which was normal     GERD (gastroesophageal reflux disease)      Screen for colon cancer 09/2021    nl         Past Surgical History:     Past Surgical History:   Procedure Laterality Date     COLONOSCOPY N/A 9/22/2021    Procedure: COLONOSCOPY;  Surgeon: Edwina Smith MD;  Location:  GI     None           Family Medical History:     Family History  "  Problem Relation Age of Onset     Hypertension Mother      Diabetes Mother      Diabetes Father          Social History:     Social History     Socioeconomic History     Marital status:      Spouse name: Not on file     Number of children: Not on file     Years of education: Not on file     Highest education level: Not on file   Occupational History     Not on file   Tobacco Use     Smoking status: Never     Smokeless tobacco: Never   Substance and Sexual Activity     Alcohol use: No     Alcohol/week: 0.0 standard drinks     Drug use: No     Sexual activity: Yes     Partners: Male   Other Topics Concern     Parent/sibling w/ CABG, MI or angioplasty before 65F 55M? Not Asked   Social History Narrative    ** Merged History Encounter **          Social Determinants of Health     Financial Resource Strain: Not on file   Food Insecurity: Not on file   Transportation Needs: Not on file   Physical Activity: Not on file   Stress: Not on file   Social Connections: Not on file   Intimate Partner Violence: Not on file   Housing Stability: Not on file           Review of System:     Constitutional: Negative for fever or chills  Skin: Negative for rashes  Ears/Nose/Throat: Negative for nasal congestion, sore throat  Respiratory: No shortness of breath, dyspnea on exertion, cough, or hemoptysis  Cardiovascular: Negative for chest pain  Gastrointestinal: Negative for nausea, vomiting  Genitourinary: Negative for dysuria, hematuria  Musculoskeletal: positive for right calf pains  Neurologic: Negative for headaches  Psychiatric: Negative for depression, anxiety  Hematologic/Lymphatic/Immunologic: Negative  Endocrine: Negative  Behavioral: Negative for tobacco use       Physical Exam:   /72 (BP Location: Right arm, Patient Position: Sitting, Cuff Size: Adult Regular)   Pulse 71   Temp 97.8  F (36.6  C) (Oral)   Resp 16   Ht 1.575 m (5' 2\")   Wt 55.8 kg (123 lb)   LMP  (LMP Unknown)   SpO2 100%   Breastfeeding " No   BMI 22.50 kg/m      GENERAL: alert and no distress  EYES: eyes grossly normal to inspection, and conjunctivae and sclerae normal  HENT: Normocephalic atraumatic. Nose and mouth without ulcers or lesions  NECK: supple  RESP: lungs clear to auscultation   CV: regular rate and rhythm, normal S1 S2  LYMPH: no peripheral edema   ABDOMEN: nondistended  MS: right calf pains noted  SKIN: no suspicious lesions or rashes  NEURO: Alert & Oriented x 3.   PSYCH: mentation appears normal, affect normal        Diagnostic Test Results:     Diagnostic Test Results:  Labs reviewed in Epic    ASSESSMENT/PLAN:       Promise was seen today for er f/u and hand pain.    Diagnoses and all orders for this visit:    Right calf pain  Hematoma  -     US Lower Extremity Venous Duplex Right; Future  - discontinued aspirin medication due to hematoma    Pain of finger of left hand  -     Orthopedic  Referral; Future    Hyperlipidemia LDL goal <100  - stable, continue Omega 3 fish oil    Gastroesophageal reflux disease, unspecified whether esophagitis present  - stable, continue current therapy          Follow Up Plan:     Patient is instructed to return to Internal Medicine clinic for follow-up visit in 2 weeks.        Roxanna Jones MD  Internal Medicine  [facility]

## 2023-03-03 NOTE — PATIENT INSTRUCTIONS
You are due or overdue for your Cervical Cancer Screening Test/Pap Smear   if this appointment has been completed outside of the [facility] system, please have the records forwarded to our office, and we will update your chart.    You may also contact [facility] at [phone] to schedule an appointment

## 2023-03-15 ENCOUNTER — ANCILLARY PROCEDURE (OUTPATIENT)
Dept: ULTRASOUND IMAGING | Facility: CLINIC | Age: 63
End: 2023-03-15
Attending: INTERNAL MEDICINE
Payer: COMMERCIAL

## 2023-03-15 DIAGNOSIS — T14.8XXA HEMATOMA: ICD-10-CM

## 2023-03-15 PROCEDURE — 93971 EXTREMITY STUDY: CPT | Mod: RT

## 2023-03-15 NOTE — PROGRESS NOTES
SUBJECTIVE:   CC: Promise is an 62 year old who presents for preventive health visit.   Patient has been advised of split billing requirements and indicates understanding: Yes  Healthy Habits:     Getting at least 3 servings of Calcium per day:  Yes    Bi-annual eye exam:  Yes    Dental care twice a year:  Yes    Sleep apnea or symptoms of sleep apnea:  None    Diet:  Regular (no restrictions)    Frequency of exercise:  1 day/week    Duration of exercise:  15-30 minutes    Taking medications regularly:  Yes    Medication side effects:  None    PHQ-2 Total Score: 0    Additional concerns today:  Yes        Today's PHQ-2 Score:   PHQ-2 ( 1999 Pfizer) 3/16/2023   Q1: Little interest or pleasure in doing things 0   Q2: Feeling down, depressed or hopeless 0   PHQ-2 Score 0   PHQ-2 Total Score (12-17 Years)- Positive if 3 or more points; Administer PHQ-A if positive -   Q1: Little interest or pleasure in doing things Not at all   Q2: Feeling down, depressed or hopeless Not at all   PHQ-2 Score 0           Social History     Tobacco Use     Smoking status: Never     Smokeless tobacco: Never   Substance Use Topics     Alcohol use: No     Alcohol/week: 0.0 standard drinks         Alcohol Use 3/16/2023   Prescreen: >3 drinks/day or >7 drinks/week? Not Applicable       Reviewed orders with patient.  Reviewed health maintenance and updated orders accordingly - Yes  Labs reviewed in EPIC    Breast Cancer Screening:    FHS-7: No flowsheet data found.    Pertinent mammograms are reviewed under the imaging tab.    History of abnormal Pap smear:   PAP / HPV Latest Ref Rng & Units 10/28/2015   PAP (Historical) - NIL   HPV16 NEG Negative   HPV18 NEG Negative   HRHPV NEG Negative     Reviewed and updated as needed this visit by clinical staff   Tobacco  Allergies  Meds              Reviewed and updated as needed this visit by Provider                   Review of Systems   Constitutional: Negative for chills and fever.   HENT:  "Negative for congestion, ear pain, hearing loss and sore throat.    Eyes: Negative for pain and visual disturbance.   Respiratory: Negative for cough and shortness of breath.    Cardiovascular: Negative for chest pain, palpitations and peripheral edema.   Gastrointestinal: Negative for abdominal pain, constipation, diarrhea, heartburn, hematochezia and nausea.   Breasts:  Negative for tenderness, breast mass and discharge.   Genitourinary: Negative for dysuria, frequency, genital sores, hematuria, pelvic pain, urgency, vaginal bleeding and vaginal discharge.   Musculoskeletal: Positive for arthralgias and myalgias. Negative for joint swelling.   Skin: Negative for rash.   Neurological: Negative for dizziness, weakness, headaches and paresthesias.   Psychiatric/Behavioral: Negative for mood changes. The patient is not nervous/anxious.      OBJECTIVE:   /74 (BP Location: Left arm, Patient Position: Sitting, Cuff Size: Adult Regular)   Pulse 68   Temp 98.7  F (37.1  C) (Oral)   Resp 14   Ht 1.575 m (5' 2\")   Wt 54.9 kg (121 lb)   LMP  (LMP Unknown)   SpO2 97%   Breastfeeding No   BMI 22.13 kg/m    Physical Exam  GENERAL:  alert and no distress  EYES: Eyes grossly normal to inspection, conjunctivae and sclerae normal  HENT: nose and mouth without ulcers or lesions  NECK: no asymmetry  RESP: lungs clear to auscultation  CV: regular rate and rhythm  ABDOMEN: nondistended  MS: mechanical right shoulder pains, right knee discomfort noted, no edema  SKIN: no suspicious lesions or rashes  NEURO: Normal strength and tone, mentation intact and speech normal  PSYCH: mentation appears normal, affect normal/bright    Diagnostic Test Results:  Labs reviewed in Epic    ASSESSMENT/PLAN:   Promise was seen today for physical, results and shoulder pain.    Diagnoses and all orders for this visit:    Routine history and physical examination of adult    Cervical cancer screening    Visit for screening mammogram  -     MA " SCREENING DIGITAL BILAT - Future  (s+30); Future    Need for vaccination    Chronic pain of right knee  -     Orthopedic  Referral; Future    Baker cyst, right  -     Orthopedic  Referral; Future    Chronic right shoulder pain  -     Orthopedic  Referral; Future        Patient has been advised of split billing requirements and indicates understanding: Yes      COUNSELING:  Reviewed preventive health counseling, as reflected in patient instructions  Special attention given to:        Regular exercise       Healthy diet/nutrition        She reports that she has never smoked. She has never used smokeless tobacco.    Roxanna Jones MD  [facility]

## 2023-03-15 NOTE — PATIENT INSTRUCTIONS
You are due or overdue for your Cervical Cancer Screening Test/Pap Smear   if this appointment has been completed outside of the [facility] system, please have the records sent to our office [phone] and we will update your chart.     You may also contact [facility] at [phone] to schedule an appointment

## 2023-03-16 ENCOUNTER — OFFICE VISIT (OUTPATIENT)
Dept: FAMILY MEDICINE | Facility: CLINIC | Age: 63
End: 2023-03-16
Payer: COMMERCIAL

## 2023-03-16 VITALS
BODY MASS INDEX: 22.26 KG/M2 | HEART RATE: 68 BPM | HEIGHT: 62 IN | WEIGHT: 121 LBS | DIASTOLIC BLOOD PRESSURE: 74 MMHG | SYSTOLIC BLOOD PRESSURE: 108 MMHG | RESPIRATION RATE: 14 BRPM | TEMPERATURE: 98.7 F | OXYGEN SATURATION: 97 %

## 2023-03-16 DIAGNOSIS — G89.29 CHRONIC PAIN OF RIGHT KNEE: ICD-10-CM

## 2023-03-16 DIAGNOSIS — Z00.00 ROUTINE HISTORY AND PHYSICAL EXAMINATION OF ADULT: Primary | ICD-10-CM

## 2023-03-16 DIAGNOSIS — G89.29 CHRONIC RIGHT SHOULDER PAIN: ICD-10-CM

## 2023-03-16 DIAGNOSIS — Z23 NEED FOR VACCINATION: ICD-10-CM

## 2023-03-16 DIAGNOSIS — M25.511 CHRONIC RIGHT SHOULDER PAIN: ICD-10-CM

## 2023-03-16 DIAGNOSIS — Z12.4 CERVICAL CANCER SCREENING: ICD-10-CM

## 2023-03-16 DIAGNOSIS — Z12.31 VISIT FOR SCREENING MAMMOGRAM: ICD-10-CM

## 2023-03-16 DIAGNOSIS — M71.21 BAKER CYST, RIGHT: ICD-10-CM

## 2023-03-16 DIAGNOSIS — M25.561 CHRONIC PAIN OF RIGHT KNEE: ICD-10-CM

## 2023-03-16 PROCEDURE — 99396 PREV VISIT EST AGE 40-64: CPT | Performed by: INTERNAL MEDICINE

## 2023-03-16 ASSESSMENT — ENCOUNTER SYMPTOMS
DIZZINESS: 0
BREAST MASS: 0
ARTHRALGIAS: 1
PARESTHESIAS: 0
PALPITATIONS: 0
DYSURIA: 0
SHORTNESS OF BREATH: 0
FEVER: 0
HEADACHES: 0
MYALGIAS: 1
NAUSEA: 0
JOINT SWELLING: 0
HEARTBURN: 0
DIARRHEA: 0
HEMATURIA: 0
EYE PAIN: 0
COUGH: 0
HEMATOCHEZIA: 0
FREQUENCY: 0
CHILLS: 0
NERVOUS/ANXIOUS: 0
ABDOMINAL PAIN: 0
CONSTIPATION: 0
SORE THROAT: 0
WEAKNESS: 0

## 2023-04-27 ENCOUNTER — HOSPITAL ENCOUNTER (OUTPATIENT)
Dept: MAMMOGRAPHY | Facility: CLINIC | Age: 63
Discharge: HOME | End: 2023-04-27
Attending: INTERNAL MEDICINE | Admitting: INTERNAL MEDICINE
Payer: COMMERCIAL

## 2023-04-27 DIAGNOSIS — Z12.31 VISIT FOR SCREENING MAMMOGRAM: ICD-10-CM

## 2023-04-27 PROCEDURE — 77067 SCR MAMMO BI INCL CAD: CPT

## 2023-06-01 NOTE — PROGRESS NOTES
[facility]  OB/GYN Clinic Note    Promise is a 63 year old  female who presents for annual exam.   Besides routine health maintenance, she has no other health concerns today .    HPI:  The patient's PCP is  Roxanna Jones MD.    Patient presents for annual exam.   Pap due today.   Sexualy active-yes, STI-no.  Mammogram done 22, colonoscopy done , repeat . DEXA at 64.   Family hx of breast, uterine, ovarian cancer- negative. Brother passed away from colon cancer. Diet/exercise: walking.   Lives with  , feels safe. Denies tobacco use, drug use, alcohol use.       GYNECOLOGIC HISTORY:  No LMP recorded (lmp unknown). Patient is postmenopausal.  Her current contraception method is: menopause.  She  reports that she has never smoked. She has never used smokeless tobacco.    Patient is sexually active.  STD testing offered?  Declined  Last PHQ-9 score on record =       2023     9:37 AM   PHQ-9 SCORE   PHQ-9 Total Score 0     Last GAD7 score on record =       2023     9:37 AM   YADY-7 SCORE   Total Score 0     Alcohol Score = 1    HEALTH MAINTENANCE:  Care Gaps  Close care gaps     Overdue          Never   Done Pneumococcal Vaccine: Pediatrics (0 to 5 Years) and At-Risk Patients (6 to 64 Years) (1 - PCV)       Never   Done ZOSTER IMMUNIZATION (1 of 2)       OCT 28   2018 HPV TEST (Once)  Last completed: Oct 28, 2015     OCT 28   2018 PAP (Every 3 Years)  Last completed: Oct 28, 2015     Never   Done HEPATITIS B IMMUNIZATION (1 of 3 - Risk 3-dose series)       2022 COVID-19 Vaccine (4 - Pfizer series)  Last completed: Dec 13, 2021        Upcoming          SEP 1   2023 INFLUENZA VACCINE (Season Ended)       DEC 26   2023 ADVANCE CARE PLANNING (Every 5 Years)  Last completed: Dec 26, 2018     MAR 16   2024 YEARLY PREVENTIVE VISIT (Yearly)  Last completed: Mar 16, 2023     OCT 31   2024 DTAP/TDAP/TD IMMUNIZATION (3 - Td or Tdap)  Last completed: Oct 31, 2014     APR  "2025 MAMMO SCREENING (Every 2 Years)  Last completed: 2023     MAY 16   2027 LIPID (Every 5 Years)  Last completed: May 16, 2022     SEP 22   2031 COLORECTAL CANCER SCREENING (COLONOSCOPY - Preferred) (Every 10 Years)  Last completed: Sep 22, 2021         Health maintenance updated:  Pap due    HISTORY:  OB History    Para Term  AB Living   0 0 0 0 0 0   SAB IAB Ectopic Multiple Live Births   0 0 0 0 0       Patient Active Problem List   Diagnosis     CARDIOVASCULAR SCREENING; LDL GOAL LESS THAN 160     History of renal stone     Shoulder pain     Greater tuberosity of humerus fracture     Small bowel obstruction (H)     Esophageal reflux     Past Surgical History:   Procedure Laterality Date     COLONOSCOPY N/A 2021    Procedure: COLONOSCOPY;  Surgeon: Edwina Smith MD;  Location:  GI     None        Social History     Tobacco Use     Smoking status: Never     Smokeless tobacco: Never   Vaping Use     Vaping status: Not on file   Substance Use Topics     Alcohol use: No     Alcohol/week: 0.0 standard drinks of alcohol      Problem (# of Occurrences) Relation (Name,Age of Onset)    Diabetes (2) Mother, Father    Hypertension (1) Mother            Current Outpatient Medications   Medication Sig     omega 3 1000 MG CAPS      valACYclovir (VALTREX) 500 MG tablet Take 1 tablet (500 mg) by mouth daily Take 500 mg by mouth daily     No current facility-administered medications for this visit.     Allergies   Allergen Reactions     No Known Allergies      Past medical, surgical, social and family histories were reviewed and updated in [facility].    EXAM:  /66   Ht 1.562 m (5' 1.5\")   Wt 53.1 kg (117 lb)   LMP  (LMP Unknown)   BMI 21.75 kg/m     BMI: Body mass index is 21.75 kg/m .    PHYSICAL EXAM:  Constitutional:   Breasts: Inspection of Breasts:  No lymphadenopathy present., Palpation of Breasts and Axillae:  No masses present on palpation, no breast tenderness., Axillary " Lymph Nodes:  No lymphadenopathy present. and No nodularity, asymmetry or nipple discharge bilaterally.         Pelvic Exam:  External Genitalia:     Normal appearance for age, no discharge present, no tenderness present, no inflammatory lesions present, color normal  Vagina:     Normal vaginal vault without central or paravaginal defects, ATROPHIC  Cervix:     Appearance healthy, no lesions present, nontender to palpation, no bleeding present  Uterus:     Nontender to palpation, no masses present, position anteflexed, mobility: normal  Adnexa:     No adnexal tenderness present, no adnexal masses present      COUNSELING:   Reviewed preventive health counseling, as reflected in patient instructions       Regular exercise       Healthy diet/nutrition       Osteoporosis prevention/bone health       (Lyn)menopause management    BMI: Body mass index is 21.75 kg/m .      ASSESSMENT:  63 year old female with satisfactory annual exam.    ICD-10-CM    1. Encounter for gynecological examination without abnormal finding  Z01.419       2. Pap smear for cervical cancer screening  Z12.4 Pap imaged thin layer screen with HPV - recommended age 30 - 65 years (select HPV order below)          PLAN:  Pap collected. Reviewed discontinuation of paps if normal. DEXA due next year. Discussed calcium, vitamin D, weight bearing exercises. Follow-up in one year for gyn exam.     Yomaira Mack MD, MHS  06/05/23

## 2023-06-05 ENCOUNTER — OFFICE VISIT (OUTPATIENT)
Dept: OBGYN | Facility: CLINIC | Age: 63
End: 2023-06-05
Payer: COMMERCIAL

## 2023-06-05 VITALS
SYSTOLIC BLOOD PRESSURE: 114 MMHG | DIASTOLIC BLOOD PRESSURE: 66 MMHG | WEIGHT: 117 LBS | HEIGHT: 62 IN | BODY MASS INDEX: 21.53 KG/M2

## 2023-06-05 DIAGNOSIS — Z12.4 PAP SMEAR FOR CERVICAL CANCER SCREENING: ICD-10-CM

## 2023-06-05 DIAGNOSIS — Z01.419 ENCOUNTER FOR GYNECOLOGICAL EXAMINATION WITHOUT ABNORMAL FINDING: Primary | ICD-10-CM

## 2023-06-05 PROCEDURE — G0123 SCREEN CERV/VAG THIN LAYER: HCPCS | Performed by: STUDENT IN AN ORGANIZED HEALTH CARE EDUCATION/TRAINING PROGRAM

## 2023-06-05 PROCEDURE — 99386 PREV VISIT NEW AGE 40-64: CPT | Performed by: STUDENT IN AN ORGANIZED HEALTH CARE EDUCATION/TRAINING PROGRAM

## 2023-06-05 PROCEDURE — 87624 HPV HI-RISK TYP POOLED RSLT: CPT | Performed by: STUDENT IN AN ORGANIZED HEALTH CARE EDUCATION/TRAINING PROGRAM

## 2023-06-05 ASSESSMENT — ANXIETY QUESTIONNAIRES
5. BEING SO RESTLESS THAT IT IS HARD TO SIT STILL: NOT AT ALL
3. WORRYING TOO MUCH ABOUT DIFFERENT THINGS: NOT AT ALL
GAD7 TOTAL SCORE: 0
6. BECOMING EASILY ANNOYED OR IRRITABLE: NOT AT ALL
7. FEELING AFRAID AS IF SOMETHING AWFUL MIGHT HAPPEN: NOT AT ALL
GAD7 TOTAL SCORE: 0
2. NOT BEING ABLE TO STOP OR CONTROL WORRYING: NOT AT ALL
1. FEELING NERVOUS, ANXIOUS, OR ON EDGE: NOT AT ALL
IF YOU CHECKED OFF ANY PROBLEMS ON THIS QUESTIONNAIRE, HOW DIFFICULT HAVE THESE PROBLEMS MADE IT FOR YOU TO DO YOUR WORK, TAKE CARE OF THINGS AT HOME, OR GET ALONG WITH OTHER PEOPLE: NOT DIFFICULT AT ALL

## 2023-06-05 ASSESSMENT — PATIENT HEALTH QUESTIONNAIRE - PHQ9
5. POOR APPETITE OR OVEREATING: NOT AT ALL
SUM OF ALL RESPONSES TO PHQ QUESTIONS 1-9: 0

## 2023-06-07 NOTE — PROGRESS NOTES
"Chief Complaint   Patient presents with     Leg Pain     Pain in right calf X 6 days, pt does not recall any injury. Was limping the first days but states she is walking better now. Sensitive to the touch, \"I can feel knot\"         ICD-10-CM    1. Right calf pain  M79.661       Patient will need rule out DVT.   [facility] is contacted, spoke with Dr. Gerber and they will be happy to see patient.  Patient was moved into an ADS room.    Subjective     Promise Nunez is an 62 year old female who presents to clinic today for 6-day history of right calf pain.  She does not recall any injury.  She was limping the first few days and is walking slightly better now but the area is very sensitive to touch and she feels a lump in the calf.    ROS: 10 point ROS neg other than the symptoms noted above in the HPI.       Objective    /75 (BP Location: Left arm, Patient Position: Sitting, Cuff Size: Adult Regular)   Pulse 77   Temp 98.3  F (36.8  C) (Oral)   Resp 19   Ht 1.575 m (5' 2\")   Wt 55.9 kg (123 lb 4.8 oz)   SpO2 97%   BMI 22.55 kg/m    Nurses notes and VS have been reviewed.    Physical Exam       GENERAL APPEARANCE: healthy appearing, alert     RESP: lungs clear to auscultation - no rales, rhonchi or wheezes     CV: regular rates and rhythm, no murmurs, rubs, or gallop     MS: extremities normal- no gross deformities noted; normal muscle tone, except for tenderness over the right posterior calf, slightly positive Homans' sign, no significant edema     SKIN: no suspicious lesions or rashes, no erythema of the right lower extremity     NEURO: Normal strength and tone, mentation intact and speech normal     PSYCH: normal thought process; no significant mood disturbance    Patient Instructions   Go directly to the ADS at [place]      ARNOLDO Ricardo, CNP  [facility] Provider    The use of Dragon/M2TECH dictation services may have been used to construct the content in this note; any grammatical or " spelling errors are non-intentional. Please contact the author of this note directly if you are in need of any clarification.    72.6

## 2023-06-09 LAB
BKR LAB AP GYN ADEQUACY: NORMAL
BKR LAB AP GYN INTERPRETATION: NORMAL
BKR LAB AP HPV REFLEX: NORMAL
BKR LAB AP PREVIOUS ABNORMAL: NORMAL
PATH REPORT.COMMENTS IMP SPEC: NORMAL
PATH REPORT.COMMENTS IMP SPEC: NORMAL
PATH REPORT.RELEVANT HX SPEC: NORMAL

## 2023-06-13 LAB
HUMAN PAPILLOMA VIRUS 16 DNA: NEGATIVE
HUMAN PAPILLOMA VIRUS 18 DNA: NEGATIVE
HUMAN PAPILLOMA VIRUS FINAL DIAGNOSIS: NORMAL
HUMAN PAPILLOMA VIRUS OTHER HR: NEGATIVE

## 2023-06-15 ENCOUNTER — MYC MEDICAL ADVICE (OUTPATIENT)
Dept: OBGYN | Facility: CLINIC | Age: 63
End: 2023-06-15
Payer: COMMERCIAL

## 2023-06-16 NOTE — TELEPHONE ENCOUNTER
Routing pt mychart question to Paula Arizmendi for guidance.  Dr Mack out of the office today.    Can you answer this question?    Tammy Tate RN on 6/16/2023 at 8:23 AM

## 2023-06-16 NOTE — TELEPHONE ENCOUNTER
Spoke with cytology lab to get insight on what is meant when specimen is unable to be imaged on the [employer] Slide .  [employer] response sent.    Lianne Raza, RACHELN, RN

## 2023-09-25 ENCOUNTER — MYC MEDICAL ADVICE (OUTPATIENT)
Dept: OBGYN | Facility: CLINIC | Age: 63
End: 2023-09-25
Payer: COMMERCIAL

## 2023-09-26 NOTE — CONFIDENTIAL NOTE
Routing pt [facility] message to scheduling team to print and start FORMS process    Tammy Tate RN on 9/26/2023 at 8:08 AM

## 2024-01-17 ENCOUNTER — OFFICE VISIT (OUTPATIENT)
Dept: FAMILY MEDICINE | Facility: CLINIC | Age: 64
End: 2024-01-17
Payer: COMMERCIAL

## 2024-01-17 VITALS
TEMPERATURE: 98.5 F | DIASTOLIC BLOOD PRESSURE: 73 MMHG | RESPIRATION RATE: 18 BRPM | WEIGHT: 122 LBS | OXYGEN SATURATION: 100 % | SYSTOLIC BLOOD PRESSURE: 111 MMHG | BODY MASS INDEX: 22.45 KG/M2 | HEIGHT: 62 IN | HEART RATE: 82 BPM

## 2024-01-17 DIAGNOSIS — E53.8 VITAMIN B12 DEFICIENCY (NON ANEMIC): ICD-10-CM

## 2024-01-17 DIAGNOSIS — L65.8 FEMALE PATTERN HAIR LOSS: Primary | ICD-10-CM

## 2024-01-17 DIAGNOSIS — B00.9 HSV INFECTION: ICD-10-CM

## 2024-01-17 DIAGNOSIS — Z13.29 SCREENING FOR THYROID DISORDER: ICD-10-CM

## 2024-01-17 DIAGNOSIS — E61.1 IRON DEFICIENCY: ICD-10-CM

## 2024-01-17 DIAGNOSIS — L60.3 DYSTROPHIC NAIL: ICD-10-CM

## 2024-01-17 PROCEDURE — 36415 COLL VENOUS BLD VENIPUNCTURE: CPT | Performed by: INTERNAL MEDICINE

## 2024-01-17 PROCEDURE — 99214 OFFICE O/P EST MOD 30 MIN: CPT | Performed by: INTERNAL MEDICINE

## 2024-01-17 PROCEDURE — 82607 VITAMIN B-12: CPT | Performed by: INTERNAL MEDICINE

## 2024-01-17 PROCEDURE — 82728 ASSAY OF FERRITIN: CPT | Performed by: INTERNAL MEDICINE

## 2024-01-17 PROCEDURE — 84443 ASSAY THYROID STIM HORMONE: CPT | Performed by: INTERNAL MEDICINE

## 2024-01-17 RX ORDER — VALACYCLOVIR HYDROCHLORIDE 500 MG/1
500 TABLET, FILM COATED ORAL DAILY
Qty: 30 TABLET | Refills: 5 | Status: SHIPPED | OUTPATIENT
Start: 2024-01-17 | End: 2024-09-11

## 2024-01-17 RX ORDER — MAGNESIUM 200 MG
TABLET ORAL DAILY
COMMUNITY

## 2024-01-17 ASSESSMENT — PAIN SCALES - GENERAL: PAINLEVEL: NO PAIN (0)

## 2024-01-17 NOTE — PROGRESS NOTES
Subjective   Promise is a 63 year old, presenting for the following health issues:  HPI       History of Present Illness       Reason for visit:  recent female pattern hair loss symptoms, dystrophic nails    She eats 2-3 servings of fruits and vegetables daily.She consumes 0 sweetened beverage(s) daily.She exercises with enough effort to increase her heart rate 10 to 19 minutes per day.  She exercises with enough effort to increase her heart rate 3 or less days per week.   She is taking medications regularly.         Chief Complaint:     Post UC discharge follow up of right calf pains, hematoma    HPI:   Patient Promise uNnez is a very pleasant 62 year old female with history of GERD, hyperlipidemia who presents to Internal Medicine clinic today for post UC discharge follow up of right calf pains, hematoma. Calf pains still present. Ultrasound on 2/24/23 shows no DVT, presence of a hematoma.           Current Medications:     Current Outpatient Medications   Medication Sig Dispense Refill    omega 3 1000 MG CAPS       valACYclovir (VALTREX) 500 MG tablet Take 1 tablet (500 mg) by mouth daily Take 500 mg by mouth daily 30 tablet 5         Allergies:      Allergies   Allergen Reactions    No Known Allergies             Past Medical History:     Past Medical History:   Diagnosis Date    Abnormal echocardiogram     She had CT coronary angiography which was normal    GERD (gastroesophageal reflux disease)     Screen for colon cancer 09/2021    nl         Past Surgical History:     Past Surgical History:   Procedure Laterality Date    COLONOSCOPY N/A 9/22/2021    Procedure: COLONOSCOPY;  Surgeon: Edwina Smith MD;  Location:  GI    None           Family Medical History:     Family History   Problem Relation Age of Onset    Hypertension Mother     Diabetes Mother     Diabetes Father          Social History:     Social History     Socioeconomic History    Marital status:      Spouse name: Not on file     "Number of children: Not on file    Years of education: Not on file    Highest education level: Not on file   Occupational History    Not on file   Tobacco Use    Smoking status: Never    Smokeless tobacco: Never   Substance and Sexual Activity    Alcohol use: No     Alcohol/week: 0.0 standard drinks    Drug use: No    Sexual activity: Yes     Partners: Male   Other Topics Concern    Parent/sibling w/ CABG, MI or angioplasty before 65F 55M? Not Asked   Social History Narrative    ** Merged History Encounter **          Social Determinants of Health     Financial Resource Strain: Not on file   Food Insecurity: Not on file   Transportation Needs: Not on file   Physical Activity: Not on file   Stress: Not on file   Social Connections: Not on file   Intimate Partner Violence: Not on file   Housing Stability: Not on file           Review of System:     Constitutional: Negative for fever or chills  Skin:positive for recent female pattern hair loss symptoms, dystrophic nails  Ears/Nose/Throat: Negative for nasal congestion, sore throat  Respiratory: No shortness of breath, dyspnea on exertion, cough, or hemoptysis  Cardiovascular: Negative for chest pain  Gastrointestinal: Negative for nausea, vomiting  Genitourinary: Negative for dysuria, hematuria  Musculoskeletal: positive for right calf pains  Neurologic: Negative for headaches  Psychiatric: Negative for depression, anxiety  Hematologic/Lymphatic/Immunologic: positive for iron deficiency  Endocrine: Negative  Behavioral: Negative for tobacco use       Physical Exam:   /72 (BP Location: Right arm, Patient Position: Sitting, Cuff Size: Adult Regular)   Pulse 71   Temp 97.8  F (36.6  C) (Oral)   Resp 16   Ht 1.575 m (5' 2\")   Wt 55.8 kg (123 lb)   LMP  (LMP Unknown)   SpO2 100%   Breastfeeding No   BMI 22.50 kg/m      GENERAL: alert and no distress  EYES: eyes grossly normal to inspection, and conjunctivae and sclerae normal  HENT: Normocephalic atraumatic. " allergic reaction Nose and mouth without ulcers or lesions  NECK: supple  RESP: lungs clear to auscultation   CV: regular rate and rhythm, normal S1 S2  LYMPH: no peripheral edema   ABDOMEN: nondistended  MS: right calf pains noted  SKIN: positive for dystrophic nails, female pattern hair loss symptoms, HSV infection symptoms of the left hand noted  NEURO: Alert & Oriented x 3.   PSYCH: mentation appears normal, affect normal        Diagnostic Test Results:     Diagnostic Test Results:  Labs reviewed in Epic    ASSESSMENT/PLAN:     Iron deficiency  Female pattern hair loss  -   recent female pattern hair loss symptoms  -  Ferritin; Future  -     Adult Dermatology  Referral; Future  -     Ferritin    Vitamin B12 deficiency (non anemic)  -     Vitamin B12; Future  -     Vitamin B12    Screening for thyroid disorder  -     TSH with free T4 reflex; Future  -     TSH with free T4 reflex    Dystrophic nail  -    dystrophic nails symptoms noted in both hands  -  Adult Dermatology  Referral; Future    HSV infection  -  HSV infection symptoms of the left hand noted  -   valACYclovir (VALTREX) 500 MG tablet; Take 1 tablet (500 mg) by mouth daily Take 500 mg by mouth daily              Follow Up Plan:     Patient is instructed to return to Internal Medicine clinic for follow-up visit in 2 weeks.        Roxanna Jones MD  Internal Medicine  [facility]

## 2024-01-18 LAB
FERRITIN SERPL-MCNC: 57 NG/ML (ref 11–328)
TSH SERPL DL<=0.005 MIU/L-ACNC: 0.78 UIU/ML (ref 0.3–4.2)
VIT B12 SERPL-MCNC: 1048 PG/ML (ref 232–1245)

## 2024-01-26 ENCOUNTER — MYC MEDICAL ADVICE (OUTPATIENT)
Dept: FAMILY MEDICINE | Facility: CLINIC | Age: 64
End: 2024-01-26

## 2024-02-05 ENCOUNTER — TRANSFERRED RECORDS (OUTPATIENT)
Dept: HEALTH INFORMATION MANAGEMENT | Facility: CLINIC | Age: 64
End: 2024-02-05

## 2024-03-20 ENCOUNTER — TRANSFERRED RECORDS (OUTPATIENT)
Dept: FAMILY MEDICINE | Facility: CLINIC | Age: 64
End: 2024-03-20

## 2024-03-20 ENCOUNTER — OFFICE VISIT (OUTPATIENT)
Dept: FAMILY MEDICINE | Facility: CLINIC | Age: 64
End: 2024-03-20
Payer: COMMERCIAL

## 2024-03-20 VITALS
HEIGHT: 62 IN | RESPIRATION RATE: 14 BRPM | WEIGHT: 124 LBS | TEMPERATURE: 97.9 F | SYSTOLIC BLOOD PRESSURE: 118 MMHG | HEART RATE: 76 BPM | BODY MASS INDEX: 22.82 KG/M2 | OXYGEN SATURATION: 100 % | DIASTOLIC BLOOD PRESSURE: 67 MMHG

## 2024-03-20 DIAGNOSIS — Z76.0 ENCOUNTER FOR MEDICATION REFILL: ICD-10-CM

## 2024-03-20 DIAGNOSIS — Z00.00 ROUTINE HISTORY AND PHYSICAL EXAMINATION OF ADULT: Primary | ICD-10-CM

## 2024-03-20 DIAGNOSIS — B00.9 HSV INFECTION: ICD-10-CM

## 2024-03-20 PROCEDURE — 99396 PREV VISIT EST AGE 40-64: CPT | Performed by: INTERNAL MEDICINE

## 2024-03-20 RX ORDER — ACYCLOVIR 50 MG/G
OINTMENT TOPICAL
Qty: 30 G | Refills: 3 | Status: SHIPPED | OUTPATIENT
Start: 2024-03-20 | End: 2024-09-11

## 2024-03-20 SDOH — HEALTH STABILITY: PHYSICAL HEALTH: ON AVERAGE, HOW MANY MINUTES DO YOU ENGAGE IN EXERCISE AT THIS LEVEL?: 90 MIN

## 2024-03-20 SDOH — HEALTH STABILITY: PHYSICAL HEALTH: ON AVERAGE, HOW MANY DAYS PER WEEK DO YOU ENGAGE IN MODERATE TO STRENUOUS EXERCISE (LIKE A BRISK WALK)?: 3 DAYS

## 2024-03-20 ASSESSMENT — PAIN SCALES - GENERAL: PAINLEVEL: NO PAIN (0)

## 2024-03-20 ASSESSMENT — SOCIAL DETERMINANTS OF HEALTH (SDOH): HOW OFTEN DO YOU GET TOGETHER WITH FRIENDS OR RELATIVES?: ONCE A WEEK

## 2024-03-20 NOTE — PROGRESS NOTES
Preventive Care Visit  [facility]  Roxanna Jones MD, Internal Medicine  Mar 20, 2024      Assessment & Plan     Routine history and physical examination of adult  Encounter for medication refill  HSV infection  - acyclovir (ZOVIRAX) 5 % external ointment  Dispense: 30 g; Refill: 3      Patient has been advised of split billing requirements and indicates understanding: Yes        Counseling  Appropriate preventive services were discussed with this patient, including applicable screening as appropriate for fall prevention, nutrition, physical activity, Tobacco-use cessation, weight loss and cognition.  Checklist reviewing preventive services available has been given to the patient.  Reviewed patient's diet, addressing concerns and/or questions.   She is at risk for lack of exercise and has been provided with information to increase physical activity for the benefit of her well-being.   She is at risk for psychosocial distress and has been provided with information to reduce risk.       FUTURE APPOINTMENTS:       - Follow-up visit in 1 year    Juanpablo Virgen is a 63 year old, presenting for the following:  Physical         Health Care Directive  Patient does not have a Health Care Directive or Living Will: Patient states has Advance Directive and will bring in a copy to clinic.    HPI        3/20/2024   General Health   How would you rate your overall physical health? Excellent   Feel stress (tense, anxious, or unable to sleep) Only a little   (!) STRESS CONCERN      3/20/2024   Nutrition   Three or more servings of calcium each day? Yes   Diet: Regular (no restrictions)   How many servings of fruit and vegetables per day? (!) 0-1   How many sweetened beverages each day? 0-1         3/20/2024   Exercise   Days per week of moderate/strenous exercise 3 days   Average minutes spent exercising at this level 90 min         3/20/2024   Social Factors   Frequency of gathering with friends or relatives  Once a week   Worry food won't last until get money to buy more No   Food not last or not have enough money for food? No   Do you have housing?  Yes   Are you worried about losing your housing? No   Lack of transportation? No   Unable to get utilities (heat,electricity)? No         3/20/2024   Fall Risk   Fallen 2 or more times in the past year? No   Trouble with walking or balance? No          3/20/2024   Dental   Dentist two times every year? Yes         3/20/2024   TB Screening   Were you born outside of the [place]? Yes         Today's PHQ-2 Score:       3/20/2024    12:48 PM   PHQ-2 ( 1999 [employer])   Q1: Little interest or pleasure in doing things 0   Q2: Feeling down, depressed or hopeless 0   PHQ-2 Score 0   Q1: Little interest or pleasure in doing things Not at all   Q2: Feeling down, depressed or hopeless Not at all   PHQ-2 Score 0           3/20/2024   Substance Use   Alcohol more than 3/day or more than 7/wk No   Do you use any other substances recreationally? No     Social History     Tobacco Use    Smoking status: Never    Smokeless tobacco: Never   Substance Use Topics    Alcohol use: No     Alcohol/week: 0.0 standard drinks of alcohol    Drug use: No             3/20/2024   Breast Cancer Screening   Family history of breast, colon, or ovarian cancer? No / Unknown         4/27/2023   LAST FHS-7 RESULTS   1st degree relative breast or ovarian cancer No   Any relative bilateral breast cancer No   Any male have breast cancer No   Any ONE woman have BOTH breast AND ovarian cancer No   Any woman with breast cancer before 50yrs No   2 or more relatives with breast AND/OR ovarian cancer No   2 or more relatives with breast AND/OR bowel cancer No       Mammogram Screening - Mammogram every 1-2 years updated in Health Maintenance based on mutual decision making        3/20/2024   STI Screening   New sexual partner(s) since last STI/HIV test? No     History of abnormal Pap smear: NO - age 30- 65 PAP every 3 years  "recommended        Latest Ref Rng & Units 6/5/2023     9:42 AM 10/28/2015    12:05 PM   PAP / HPV   PAP  Negative for Intraepithelial Lesion or Malignancy (NILM)     PAP (Historical)   NIL    HPV 16 DNA Negative Negative  Negative    HPV 18 DNA Negative Negative  Negative    Other HR HPV Negative Negative  Negative      ASCVD Risk   The 10-year ASCVD risk score (Monroe CHAUDHARY, et al., 2019) is: 3.4%    Values used to calculate the score:      Age: 63 years      Sex: Female      Is Non- : No      Diabetic: No      Tobacco smoker: No      Systolic Blood Pressure: 111 mmHg      Is BP treated: No      HDL Cholesterol: 82 mg/dL      Total Cholesterol: 227 mg/dL      Reviewed and updated as needed this visit by Provider                    Past Medical History:   Diagnosis Date    Abnormal echocardiogram     She had CT coronary angiography which was normal    GERD (gastroesophageal reflux disease)     Screen for colon cancer 09/2021    nl         Review of Systems  Constitutional, HEENT, cardiovascular, pulmonary, GI, , musculoskeletal, neuro, skin, endocrine and psych systems are negative, except as otherwise noted.     Objective    Exam  /67 (BP Location: Right arm, Patient Position: Sitting, Cuff Size: Adult Regular)   Pulse 76   Temp 97.9  F (36.6  C) (Oral)   Resp 14   Ht 1.562 m (5' 1.5\")   Wt 56.2 kg (124 lb)   LMP  (LMP Unknown)   SpO2 100%   Breastfeeding No   BMI 23.05 kg/m     Estimated body mass index is 22.68 kg/m  as calculated from the following:    Height as of 1/17/24: 1.562 m (5' 1.5\").    Weight as of 1/17/24: 55.3 kg (122 lb).    Physical Exam  GENERAL: alert and no distress  EYES: Eyes grossly normal to inspection, conjunctivae and sclerae normal  HENT: ear canals and TM's normal, nose and mouth without ulcers or lesions  NECK: no asymmetry  RESP: lungs clear to auscultation  CV: regular rate and rhythm, normal S1 S2  ABDOMEN: soft, nontender,bowel sounds " normal  MS: no gross musculoskeletal defects noted, no edema  SKIN: no suspicious lesions or rashes  NEURO: Normal strength and tone, mentation intact and speech normal  PSYCH: mentation appears normal, affect normal/bright        Signed Electronically by: Roxanna Jones MD

## 2024-06-17 ENCOUNTER — OFFICE VISIT (OUTPATIENT)
Dept: URGENT CARE | Facility: URGENT CARE | Age: 64
End: 2024-06-17
Payer: COMMERCIAL

## 2024-06-17 VITALS
WEIGHT: 124 LBS | DIASTOLIC BLOOD PRESSURE: 64 MMHG | OXYGEN SATURATION: 100 % | RESPIRATION RATE: 16 BRPM | BODY MASS INDEX: 23.05 KG/M2 | HEART RATE: 84 BPM | SYSTOLIC BLOOD PRESSURE: 114 MMHG

## 2024-06-17 DIAGNOSIS — L98.9 SKIN LESION OF HAND: Primary | ICD-10-CM

## 2024-06-17 DIAGNOSIS — R09.89 DECREASED DIASTOLIC BLOOD PRESSURE: ICD-10-CM

## 2024-06-17 PROCEDURE — 99213 OFFICE O/P EST LOW 20 MIN: CPT | Performed by: STUDENT IN AN ORGANIZED HEALTH CARE EDUCATION/TRAINING PROGRAM

## 2024-06-17 RX ORDER — CYCLOBENZAPRINE HCL 10 MG
TABLET ORAL
COMMUNITY
Start: 2024-06-13 | End: 2024-09-11

## 2024-06-20 ENCOUNTER — OFFICE VISIT (OUTPATIENT)
Dept: FAMILY MEDICINE | Facility: CLINIC | Age: 64
End: 2024-06-20
Payer: COMMERCIAL

## 2024-06-20 VITALS
WEIGHT: 126 LBS | OXYGEN SATURATION: 100 % | DIASTOLIC BLOOD PRESSURE: 72 MMHG | TEMPERATURE: 97.9 F | HEART RATE: 74 BPM | SYSTOLIC BLOOD PRESSURE: 113 MMHG | BODY MASS INDEX: 23.79 KG/M2 | HEIGHT: 61 IN | RESPIRATION RATE: 16 BRPM

## 2024-06-20 DIAGNOSIS — D22.9 SKIN MOLE: ICD-10-CM

## 2024-06-20 DIAGNOSIS — T14.8XXA BLOOD BLISTER: Primary | ICD-10-CM

## 2024-06-20 LAB
HGB BLD-MCNC: 13.4 G/DL (ref 11.7–15.7)
PLATELET # BLD AUTO: 200 10E3/UL (ref 150–450)

## 2024-06-20 PROCEDURE — 36415 COLL VENOUS BLD VENIPUNCTURE: CPT | Performed by: INTERNAL MEDICINE

## 2024-06-20 PROCEDURE — 99213 OFFICE O/P EST LOW 20 MIN: CPT | Performed by: INTERNAL MEDICINE

## 2024-06-20 PROCEDURE — 85018 HEMOGLOBIN: CPT | Performed by: INTERNAL MEDICINE

## 2024-06-20 PROCEDURE — 85049 AUTOMATED PLATELET COUNT: CPT | Performed by: INTERNAL MEDICINE

## 2024-06-20 RX ORDER — RESPIRATORY SYNCYTIAL VIRUS VACCINE 120MCG/0.5
0.5 KIT INTRAMUSCULAR ONCE
Qty: 1 EACH | Refills: 0 | Status: CANCELLED | OUTPATIENT
Start: 2024-06-20 | End: 2024-06-20

## 2024-06-20 RX ORDER — PREDNISONE 10 MG/1
TABLET ORAL
COMMUNITY
Start: 2024-06-17 | End: 2024-09-11

## 2024-06-20 ASSESSMENT — PAIN SCALES - GENERAL: PAINLEVEL: NO PAIN (0)

## 2024-06-20 NOTE — PROGRESS NOTES
Juanpablo Virgen is a 64 year old, presenting for the following health issues:     UC Follow-Up (left middle finger blood blister and skin mole upper chest wall)    History of Present Illness       Reason for visit:  UC Follow-Up (left middle finger blood blister and skin mole upper chest wall)  Symptom onset:  3-7 days ago  Symptoms include:  Blue protuberance on finger, skin mole on upper chest wall  Symptom intensity:  Mild  Symptom progression:  Staying the same  Had these symptoms before:  No  What makes it worse:  No  What makes it better:  No    She eats 2-3 servings of fruits and vegetables daily.She consumes 0 sweetened beverage(s) daily.She exercises with enough effort to increase her heart rate 20 to 29 minutes per day.  She exercises with enough effort to increase her heart rate 3 or less days per week.   She is taking medications regularly.      Current Medications:     Current Outpatient Medications   Medication Sig Dispense Refill    acyclovir (ZOVIRAX) 5 % external ointment Apply topically 5 times daily 30 g 3    cyanocobalamin 1000 MCG sublingual tablet Place under the tongue daily      cyclobenzaprine (FLEXERIL) 10 MG tablet       omega 3 1000 MG CAPS       predniSONE (DELTASONE) 10 MG tablet       UNABLE TO FIND MEDICATION NAME: Dick      valACYclovir (VALTREX) 500 MG tablet Take 1 tablet (500 mg) by mouth daily Take 500 mg by mouth daily 30 tablet 5         Allergies:      Allergies   Allergen Reactions    No Known Allergies             Past Medical History:     Past Medical History:   Diagnosis Date    Abnormal echocardiogram     She had CT coronary angiography which was normal    GERD (gastroesophageal reflux disease)     Screen for colon cancer 09/2021    nl         Past Surgical History:     Past Surgical History:   Procedure Laterality Date    COLONOSCOPY N/A 9/22/2021    Procedure: COLONOSCOPY;  Surgeon: Edwina Smith MD;  Location:  GI    None           Family Medical History:      Family History   Problem Relation Age of Onset    Hypertension Mother     Diabetes Mother     Diabetes Father          Social History:     Social History     Socioeconomic History    Marital status:      Spouse name: Not on file    Number of children: Not on file    Years of education: Not on file    Highest education level: Not on file   Occupational History    Not on file   Tobacco Use    Smoking status: Never    Smokeless tobacco: Never   Substance and Sexual Activity    Alcohol use: No     Alcohol/week: 0.0 standard drinks of alcohol    Drug use: No    Sexual activity: Yes     Partners: Male   Other Topics Concern    Parent/sibling w/ CABG, MI or angioplasty before 65F 55M? Not Asked   Social History Narrative    ** Merged History Encounter **          Social Determinants of Health     Financial Resource Strain: Low Risk  (3/20/2024)    Financial Resource Strain     Within the past 12 months, have you or your family members you live with been unable to get utilities (heat, electricity) when it was really needed?: No   Food Insecurity: Low Risk  (3/20/2024)    Food Insecurity     Within the past 12 months, did you worry that your food would run out before you got money to buy more?: No     Within the past 12 months, did the food you bought just not last and you didn t have money to get more?: No   Transportation Needs: Low Risk  (3/20/2024)    Transportation Needs     Within the past 12 months, has lack of transportation kept you from medical appointments, getting your medicines, non-medical meetings or appointments, work, or from getting things that you need?: No   Physical Activity: Sufficiently Active (3/20/2024)    Exercise Vital Sign     Days of Exercise per Week: 3 days     Minutes of Exercise per Session: 90 min   Stress: No Stress Concern Present (3/20/2024)    Citizen of [place] [employer] - Occupational Stress Questionnaire     Feeling of Stress : Only a little   Social  "Connections: Unknown (3/20/2024)    Social Connection and Isolation Panel [NHANES]     Frequency of Communication with Friends and Family: Not on file     Frequency of Social Gatherings with Friends and Family: Once a week     Attends Druze Services: Not on file     Active Member of Clubs or Organizations: Not on file     Attends Club or Organization Meetings: Not on file     Marital Status: Not on file   Interpersonal Safety: Low Risk  (6/20/2024)    Interpersonal Safety     Do you feel physically and emotionally safe where you currently live?: Yes     Within the past 12 months, have you been hit, slapped, kicked or otherwise physically hurt by someone?: No     Within the past 12 months, have you been humiliated or emotionally abused in other ways by your partner or ex-partner?: No   Housing Stability: Low Risk  (3/20/2024)    Housing Stability     Do you have housing? : Yes     Are you worried about losing your housing?: No           Review of System:     Constitutional: Negative for fever or chills  Skin: positive for left middle finger blood blister and skin mole upper chest wall symptoms  Ears/Nose/Throat: Negative for nasal congestion, sore throat  Respiratory: No shortness of breath, dyspnea on exertion, cough, or hemoptysis  Cardiovascular: Negative for chest pain  Gastrointestinal: Negative for nausea, vomiting  Genitourinary: Negative for dysuria, hematuria  Musculoskeletal: Negative for myalgias  Neurologic: Negative for headaches  Psychiatric: Negative for depression, anxiety  Hematologic/Lymphatic/Immunologic: Negative  Endocrine: Negative  Behavioral: Negative for tobacco use       Physical Exam:   /72 (BP Location: Right arm, Patient Position: Sitting, Cuff Size: Adult Regular)   Pulse 74   Temp 97.9  F (36.6  C) (Oral)   Resp 16   Ht 1.562 m (5' 1.5\")   Wt 57.2 kg (126 lb)   LMP  (LMP Unknown)   SpO2 100%   Breastfeeding No   BMI 23.42 kg/m      GENERAL: alert and no " distress  EYES: eyes grossly normal to inspection, and conjunctivae and sclerae normal  HENT: Normocephalic atraumatic. Nose and mouth without ulcers or lesions  NECK: supple  RESP: lungs clear to auscultation   CV: regular rate and rhythm, normal S1 S2  LYMPH: no peripheral edema   ABDOMEN: nondistended  MS: no gross musculoskeletal defects noted  SKIN: left middle finger blood blister and skin mole upper chest wall symptoms noted  NEURO: Alert & Oriented x 3.   PSYCH: mentation appears normal, affect normal        Diagnostic Test Results:     Diagnostic Test Results:  Labs reviewed in Epic    ASSESSMENT/PLAN:       Promise was seen today for uc follow-up and lesion.    Diagnoses and all orders for this visit:    Blood blister  -     Platelet count; Future  -     Hemoglobin; Future  -     Platelet count  -     Hemoglobin    Skin mole  -     Adult Dermatology  Referral; Future            Follow Up Plan:     Patient is instructed to return to Internal Medicine clinic for follow-up visit in 1 month.        Roxanna Jones MD  Internal Medicine  [facility]         Signed Electronically by: Roxanna Jones MD

## 2024-06-24 ENCOUNTER — TELEPHONE (OUTPATIENT)
Dept: DERMATOLOGY | Facility: CLINIC | Age: 64
End: 2024-06-24
Payer: COMMERCIAL

## 2024-06-24 NOTE — TELEPHONE ENCOUNTER
[facility] Call Center    Phone Message    May a detailed message be left on voicemail: yes      Reason for Call: Patient has a referral for checking a concerning mole - she does not want to wait for first avail and wants to know if she can switch her hairloss appt on 8/27 to a skin check and reschedule her hairloss appt - please call back [phone] Thank you     Action Taken: Other: OX DERM    Travel Screening: Not Applicable     Date of Service:

## 2024-06-24 NOTE — TELEPHONE ENCOUNTER
Patient Contact    Attempt # 1    Was call answered?  No      Left message on answering machine for patient to call back.    Dinora MORENO RN  Dermatology   [phone]

## 2024-06-24 NOTE — TELEPHONE ENCOUNTER
Called and spoke with pt and scheduled appt    Dinora MORENO RN  Dermatology   [phone]     Please see the full imaging report from the ViewPoint program under the imaging tab.    Constance Howell MD  Maternal Fetal Medicine

## 2024-06-26 ENCOUNTER — OFFICE VISIT (OUTPATIENT)
Dept: URGENT CARE | Facility: URGENT CARE | Age: 64
End: 2024-06-26
Payer: COMMERCIAL

## 2024-06-26 VITALS
HEART RATE: 83 BPM | DIASTOLIC BLOOD PRESSURE: 77 MMHG | BODY MASS INDEX: 23.24 KG/M2 | WEIGHT: 125 LBS | RESPIRATION RATE: 18 BRPM | OXYGEN SATURATION: 99 % | TEMPERATURE: 98.3 F | SYSTOLIC BLOOD PRESSURE: 120 MMHG

## 2024-06-26 DIAGNOSIS — S60.032A CONTUSION OF LEFT MIDDLE FINGER WITHOUT DAMAGE TO NAIL, INITIAL ENCOUNTER: Primary | ICD-10-CM

## 2024-06-26 PROCEDURE — 99212 OFFICE O/P EST SF 10 MIN: CPT | Performed by: NURSE PRACTITIONER

## 2024-06-27 NOTE — PROGRESS NOTES
Chief Complaint   Patient presents with    Urgent Care    Finger     Seen 6/17/24 at  - Pt complains of left hand middle finger turning blue, discomfort x 11 days - Pt is concerned because she has a family hx of CVA         ICD-10-CM    1. Contusion of left middle finger without damage to nail, initial encounter  S60.032A       Follow-up with primary care as needed.  Reassurance provided    Subjective     Promise Nunez is an 64 year old female who presents to clinic today for purplish discoloration on left middle finger.  She was seen on 6/17/2024 for a bump on this finger and told this was likely benign and to continue to observe the area.  She was seen again on 6/20/2024 by her primary care provider and was told it was a blood blister.  Patient continues to express concerns about possibly having a vascular problem.  Today the little red bump change so that she had a small area of ecchymosis on the palmar side of the finger but this is now resolved.    There is been no trauma to the finger and it is not painful.      Objective    /77 (BP Location: Left arm, Patient Position: Sitting, Cuff Size: Adult Regular)   Pulse 83   Temp 98.3  F (36.8  C) (Tympanic)   Resp 18   Wt 56.7 kg (125 lb)   LMP  (LMP Unknown)   SpO2 99%   BMI 23.24 kg/m    Nurses notes and VS have been reviewed.    Physical Exam       GENERAL APPEARANCE: healthy appearing, alert     MS: extremities normal- no gross deformities noted; normal muscle tone, normal CMS to left middle finger,      SKIN: no suspicious lesions or rashes     NEURO: Normal strength and tone, mentation intact and speech normal      ARNOLDO Ricardo, CNP  [facility] Provider    The use of Dragon/[place] dictation services may have been used to construct the content in this note; any grammatical or spelling errors are non-intentional. Please contact the author of this note directly if you are in need of any clarification.

## 2024-07-08 ENCOUNTER — OFFICE VISIT (OUTPATIENT)
Dept: DERMATOLOGY | Facility: CLINIC | Age: 64
End: 2024-07-08
Payer: COMMERCIAL

## 2024-07-08 DIAGNOSIS — T14.8XXA BRUISING: Primary | ICD-10-CM

## 2024-07-08 DIAGNOSIS — L81.4 LENTIGINES: ICD-10-CM

## 2024-07-08 DIAGNOSIS — D22.9 SKIN MOLE: ICD-10-CM

## 2024-07-08 DIAGNOSIS — D36.9 ANGIOFIBROMA: ICD-10-CM

## 2024-07-08 PROCEDURE — 99203 OFFICE O/P NEW LOW 30 MIN: CPT | Performed by: STUDENT IN AN ORGANIZED HEALTH CARE EDUCATION/TRAINING PROGRAM

## 2024-07-08 NOTE — PROGRESS NOTES
[facility] Dermatology Note    Encounter Date: Jul 8, 2024    Dermatology Problem List:    ______________________________________    Impression/Plan:  Promise was seen today for derm problem.    Diagnoses and all orders for this visit:    Bruising  -Spontaneous bruising of a single digit that self resolved, they are concerned that it might be recurring but has not yet  - Discussed the fact that it resolved is a powerful historical clues but the events causing it were likely benign  - Discussed the possibility of Achenbach syndrome which is an idiopathic condition characterized by spontaneous subcutaneous hemorrhage of a solitary digit can sometimes recur    Skin mole  -     Adult Dermatology  Referral    Angiofibroma  - benign    Lentigines  - Reviewed the compounding benefits of incremental changes to sun protective clothing behaviors including increased frequency of sunscreen and sun protective clothing like broad brimmed hats and longsleeved UPF containing clothing      Follow-up PRN.       Staff Involved:  Staff Only    Maynor Lafleur MD   of Dermatology  Department of Dermatology  [facility]      CC:   Chief Complaint   Patient presents with    Derm Problem     Skin check        History of Present Illness:  Ms. Promise Nunez is a 64 year old female who presents as a new patient.    Pt presents today for concerns about spots on trunk and extremities. Spot on L middle finger that appeared purpuric       Labs:      Physical exam:  Vitals: LMP  (LMP Unknown)   GEN: well developed, well-nourished, in no acute distress, in a pleasant mood.     SKIN: Baker phototype 1  - Waist-up skin, which includes the head/face, neck, both arms, chest, back, abdomen, digits and/or nails was examined.  - Flat brown macules and patches in a sun exposed areas on face and extremities  -Pink papule on distal nasal tip  - No bruising visible left middle  finger  - No other lesions of concern on areas examined.     Past Medical History:   Past Medical History:   Diagnosis Date    Abnormal echocardiogram     She had CT coronary angiography which was normal    GERD (gastroesophageal reflux disease)     Screen for colon cancer 09/2021    nl     Past Surgical History:   Procedure Laterality Date    COLONOSCOPY N/A 9/22/2021    Procedure: COLONOSCOPY;  Surgeon: Edwina Smith MD;  Location:  GI    None         Social History:   reports that she has never smoked. She has never used smokeless tobacco. She reports that she does not drink alcohol and does not use drugs.    Family History:  Family History   Problem Relation Age of Onset    Hypertension Mother     Diabetes Mother     Diabetes Father        Medications:  Current Outpatient Medications   Medication Sig Dispense Refill    acyclovir (ZOVIRAX) 5 % external ointment Apply topically 5 times daily (Patient not taking: Reported on 6/26/2024) 30 g 3    cyanocobalamin 1000 MCG sublingual tablet Place under the tongue daily (Patient not taking: Reported on 6/26/2024)      cyclobenzaprine (FLEXERIL) 10 MG tablet  (Patient not taking: Reported on 6/26/2024)      omega 3 1000 MG CAPS  (Patient not taking: Reported on 6/26/2024)      predniSONE (DELTASONE) 10 MG tablet  (Patient not taking: Reported on 6/26/2024)      UNABLE TO FIND MEDICATION NAME: Dick (Patient not taking: Reported on 6/26/2024)      valACYclovir (VALTREX) 500 MG tablet Take 1 tablet (500 mg) by mouth daily Take 500 mg by mouth daily (Patient not taking: Reported on 6/26/2024) 30 tablet 5     Allergies   Allergen Reactions    No Known Allergies

## 2024-07-08 NOTE — LETTER
7/8/2024      Promise Nunez  [address]  [place] [address]      Dear Colleague,    Thank you for referring your patient, Promise Nunez, to the [facility]. Please see a copy of my visit note below.    [facility] Dermatology Note    Encounter Date: Jul 8, 2024    Dermatology Problem List:    ______________________________________    Impression/Plan:  Promise was seen today for derm problem.    Diagnoses and all orders for this visit:    Bruising  -Spontaneous bruising of a single digit that self resolved, they are concerned that it might be recurring but has not yet  - Discussed the fact that it resolved is a powerful historical clues but the events causing it were likely benign  - Discussed the possibility of Achenbach syndrome which is an idiopathic condition characterized by spontaneous subcutaneous hemorrhage of a solitary digit can sometimes recur    Skin mole  -     Adult Dermatology  Referral    Angiofibroma  - benign    Lentigines  - Reviewed the compounding benefits of incremental changes to sun protective clothing behaviors including increased frequency of sunscreen and sun protective clothing like broad brimmed hats and longsleeved UPF containing clothing      Follow-up PRN.       Staff Involved:  Staff Only    Maynor Lafleur MD   of Dermatology  Department of Dermatology  [facility]      CC:   Chief Complaint   Patient presents with     Derm Problem     Skin check        History of Present Illness:  Ms. Promise Nunez is a 64 year old female who presents as a new patient.    Pt presents today for concerns about spots on trunk and extremities. Spot on L middle finger that appeared purpuric       Labs:      Physical exam:  Vitals: LMP  (LMP Unknown)   GEN: well developed, well-nourished, in no acute distress, in a pleasant mood.     SKIN: Baker phototype 1  - Waist-up skin, which  includes the head/face, neck, both arms, chest, back, abdomen, digits and/or nails was examined.  - Flat brown macules and patches in a sun exposed areas on face and extremities  -Pink papule on distal nasal tip  - No bruising visible left middle finger  - No other lesions of concern on areas examined.     Past Medical History:   Past Medical History:   Diagnosis Date     Abnormal echocardiogram     She had CT coronary angiography which was normal     GERD (gastroesophageal reflux disease)      Screen for colon cancer 09/2021    nl     Past Surgical History:   Procedure Laterality Date     COLONOSCOPY N/A 9/22/2021    Procedure: COLONOSCOPY;  Surgeon: Edwina Smith MD;  Location:  GI     None         Social History:   reports that she has never smoked. She has never used smokeless tobacco. She reports that she does not drink alcohol and does not use drugs.    Family History:  Family History   Problem Relation Age of Onset     Hypertension Mother      Diabetes Mother      Diabetes Father        Medications:  Current Outpatient Medications   Medication Sig Dispense Refill     acyclovir (ZOVIRAX) 5 % external ointment Apply topically 5 times daily (Patient not taking: Reported on 6/26/2024) 30 g 3     cyanocobalamin 1000 MCG sublingual tablet Place under the tongue daily (Patient not taking: Reported on 6/26/2024)       cyclobenzaprine (FLEXERIL) 10 MG tablet  (Patient not taking: Reported on 6/26/2024)       omega 3 1000 MG CAPS  (Patient not taking: Reported on 6/26/2024)       predniSONE (DELTASONE) 10 MG tablet  (Patient not taking: Reported on 6/26/2024)       UNABLE TO FIND MEDICATION NAME: Dick (Patient not taking: Reported on 6/26/2024)       valACYclovir (VALTREX) 500 MG tablet Take 1 tablet (500 mg) by mouth daily Take 500 mg by mouth daily (Patient not taking: Reported on 6/26/2024) 30 tablet 5     Allergies   Allergen Reactions     No Known Allergies                Again, thank you for allowing  me to participate in the care of your patient.        Sincerely,        Maynor Lafleur MD

## 2024-07-08 NOTE — PATIENT INSTRUCTIONS
Spontaneous bruising on a digit can be consistent with something called Achenbach Syndrome. This is totally benign

## 2024-08-27 ENCOUNTER — OFFICE VISIT (OUTPATIENT)
Dept: DERMATOLOGY | Facility: CLINIC | Age: 64
End: 2024-08-27
Payer: COMMERCIAL

## 2024-08-27 DIAGNOSIS — L65.8 FEMALE PATTERN HAIR LOSS: ICD-10-CM

## 2024-08-27 DIAGNOSIS — L64.9 ANDROGENETIC ALOPECIA: Primary | ICD-10-CM

## 2024-08-27 PROCEDURE — 99213 OFFICE O/P EST LOW 20 MIN: CPT | Performed by: PHYSICIAN ASSISTANT

## 2024-08-27 PROCEDURE — G2211 COMPLEX E/M VISIT ADD ON: HCPCS | Performed by: PHYSICIAN ASSISTANT

## 2024-08-27 RX ORDER — FINASTERIDE 5 MG/1
TABLET, FILM COATED ORAL
Qty: 45 TABLET | Refills: 3 | Status: SHIPPED | OUTPATIENT
Start: 2024-08-27 | End: 2024-09-11

## 2024-08-27 RX ORDER — FINASTERIDE 5 MG/1
TABLET, FILM COATED ORAL
Qty: 45 TABLET | Refills: 3 | Status: SHIPPED | OUTPATIENT
Start: 2024-08-27 | End: 2024-08-27

## 2024-08-27 NOTE — PROGRESS NOTES
HPI:   Chief complaints: Promise Nunez is a pleasant 64 year old female who presents for evaluation of hair loss. She has noticed gradual thinning along the right temple and has noticed her part line widening over the past 9 months. No pain or itching on her scalp. She has not yet tried any treatments. She does have a brother with hair thinning. She had a recent ferritin and TSH which were normal.       PHYSICAL EXAM:    LMP  (LMP Unknown)   Skin exam performed as follows: Type 3 skin. Mood appropriate  Alert and Oriented X 3. Well developed, well nourished in no distress.  General appearance: Normal  Head including face: Normal  Eyes: conjunctiva and lids: Normal  Mouth: Lips, teeth, gums: Normal  Neck: Normal  Skin: Scalp and body hair: See below    Decreased density through vertex scalp. Scalp normal without erythema or scaling. Negative hair pull.     ASSESSMENT/PLAN:     Androgenic alopecia. Advised on natural course and progression secondary to hormones and genetics. Discussed topical minoxidil 5% foam or solution as well as finasteride.    --Photographs taken for reference  --Start finasteride daily. Discussed risks of decreased libido, decreased spermatogenesis, erectile dysfunction and increased risk of breast cancer in men.   --Consider topical minoxidil, Viviscal and Nutrafol as well        Follow-up: 9-12 months  CC:   Scribed By: Shelby Herman MS, PABETTY     The above referenced H&P in the office on 12/16/2021 by Dr. Carleen Olvera was reviewed by Shannan Mora MD on 1/4/2022, the patient was examined and no significant changes have occurred in the patient's condition since the H&P was performed.   Risks and benef

## 2024-08-27 NOTE — PATIENT INSTRUCTIONS
This is androgenetic alopecia (aka female pattern hair loss)     Start 1/2 tablet of finasteride daily    Also consider:    OTC 5% minoxidil foam or solution   2. OTC Viviscal or Nutrafol (vitamins)     Follow-up in 9-12 months

## 2024-08-27 NOTE — LETTER
8/27/2024      Promise Nunez  [address]  [place] [address]      Dear Colleague,    Thank you for referring your patient, Promise Nunez, to the [facility]. Please see a copy of my visit note below.    HPI:   Chief complaints: Promise Nunez is a pleasant 64 year old female who presents for evaluation of hair loss. She has noticed gradual thinning along the right temple and has noticed her part line widening over the past 9 months. No pain or itching on her scalp. She has not yet tried any treatments. She does have a brother with hair thinning. She had a recent ferritin and TSH which were normal.       PHYSICAL EXAM:    LMP  (LMP Unknown)   Skin exam performed as follows: Type 3 skin. Mood appropriate  Alert and Oriented X 3. Well developed, well nourished in no distress.  General appearance: Normal  Head including face: Normal  Eyes: conjunctiva and lids: Normal  Mouth: Lips, teeth, gums: Normal  Neck: Normal  Skin: Scalp and body hair: See below    Decreased density through vertex scalp. Scalp normal without erythema or scaling. Negative hair pull.     ASSESSMENT/PLAN:     Androgenic alopecia. Advised on natural course and progression secondary to hormones and genetics. Discussed topical minoxidil 5% foam or solution as well as finasteride.    --Photographs taken for reference  --Start finasteride daily. Discussed risks of decreased libido, decreased spermatogenesis, erectile dysfunction and increased risk of breast cancer in men.   --Consider topical minoxidil, Viviscal and Nutrafol as well        Follow-up: 9-12 months  CC:   Scribed By: Shelby Herman, MS, PABETTY      Again, thank you for allowing me to participate in the care of your patient.        Sincerely,        Shelby Herman PA-C

## 2024-09-11 ENCOUNTER — OFFICE VISIT (OUTPATIENT)
Dept: FAMILY MEDICINE | Facility: CLINIC | Age: 64
End: 2024-09-11
Payer: COMMERCIAL

## 2024-09-11 VITALS
TEMPERATURE: 97.6 F | WEIGHT: 127 LBS | RESPIRATION RATE: 14 BRPM | DIASTOLIC BLOOD PRESSURE: 66 MMHG | HEART RATE: 69 BPM | HEIGHT: 61 IN | OXYGEN SATURATION: 98 % | BODY MASS INDEX: 23.98 KG/M2 | SYSTOLIC BLOOD PRESSURE: 112 MMHG

## 2024-09-11 DIAGNOSIS — Z00.00 WELCOME TO MEDICARE PREVENTIVE VISIT: Primary | ICD-10-CM

## 2024-09-11 DIAGNOSIS — Z78.0 ASYMPTOMATIC POSTMENOPAUSAL STATE: ICD-10-CM

## 2024-09-11 DIAGNOSIS — Z12.31 ENCOUNTER FOR SCREENING MAMMOGRAM FOR BREAST CANCER: ICD-10-CM

## 2024-09-11 DIAGNOSIS — B00.9 HSV INFECTION: ICD-10-CM

## 2024-09-11 DIAGNOSIS — H61.23 BILATERAL IMPACTED CERUMEN: ICD-10-CM

## 2024-09-11 LAB
ALBUMIN SERPL BCG-MCNC: 4.5 G/DL (ref 3.5–5.2)
ALP SERPL-CCNC: 78 U/L (ref 40–150)
ALT SERPL W P-5'-P-CCNC: 11 U/L (ref 0–50)
ANION GAP SERPL CALCULATED.3IONS-SCNC: 9 MMOL/L (ref 7–15)
AST SERPL W P-5'-P-CCNC: 22 U/L (ref 0–45)
BILIRUB SERPL-MCNC: 0.4 MG/DL
BUN SERPL-MCNC: 16.2 MG/DL (ref 8–23)
CALCIUM SERPL-MCNC: 9.5 MG/DL (ref 8.8–10.4)
CHLORIDE SERPL-SCNC: 105 MMOL/L (ref 98–107)
CHOLEST SERPL-MCNC: 248 MG/DL
CREAT SERPL-MCNC: 0.65 MG/DL (ref 0.51–0.95)
EGFRCR SERPLBLD CKD-EPI 2021: >90 ML/MIN/1.73M2
ERYTHROCYTE [DISTWIDTH] IN BLOOD BY AUTOMATED COUNT: 13.4 % (ref 10–15)
FASTING STATUS PATIENT QL REPORTED: ABNORMAL
FASTING STATUS PATIENT QL REPORTED: NORMAL
FERRITIN SERPL-MCNC: 45 NG/ML (ref 11–328)
FOLATE SERPL-MCNC: 24.2 NG/ML (ref 4.6–34.8)
GLUCOSE SERPL-MCNC: 90 MG/DL (ref 70–99)
HBA1C MFR BLD: 5.8 % (ref 0–5.6)
HCO3 SERPL-SCNC: 25 MMOL/L (ref 22–29)
HCT VFR BLD AUTO: 42.2 % (ref 35–47)
HDLC SERPL-MCNC: 65 MG/DL
HGB BLD-MCNC: 14.2 G/DL (ref 11.7–15.7)
LDLC SERPL CALC-MCNC: 163 MG/DL
MCH RBC QN AUTO: 30.5 PG (ref 26.5–33)
MCHC RBC AUTO-ENTMCNC: 33.6 G/DL (ref 31.5–36.5)
MCV RBC AUTO: 91 FL (ref 78–100)
NONHDLC SERPL-MCNC: 183 MG/DL
PLATELET # BLD AUTO: 225 10E3/UL (ref 150–450)
POTASSIUM SERPL-SCNC: 4.6 MMOL/L (ref 3.4–5.3)
PROT SERPL-MCNC: 7.9 G/DL (ref 6.4–8.3)
RBC # BLD AUTO: 4.66 10E6/UL (ref 3.8–5.2)
SODIUM SERPL-SCNC: 139 MMOL/L (ref 135–145)
TRIGL SERPL-MCNC: 100 MG/DL
TSH SERPL DL<=0.005 MIU/L-ACNC: 1.29 UIU/ML (ref 0.3–4.2)
VIT B12 SERPL-MCNC: 1125 PG/ML (ref 232–1245)
WBC # BLD AUTO: 4.6 10E3/UL (ref 4–11)

## 2024-09-11 PROCEDURE — 82746 ASSAY OF FOLIC ACID SERUM: CPT | Mod: GZ | Performed by: FAMILY MEDICINE

## 2024-09-11 PROCEDURE — 80061 LIPID PANEL: CPT | Mod: GZ | Performed by: FAMILY MEDICINE

## 2024-09-11 PROCEDURE — G0009 ADMIN PNEUMOCOCCAL VACCINE: HCPCS | Performed by: FAMILY MEDICINE

## 2024-09-11 PROCEDURE — 82607 VITAMIN B-12: CPT | Mod: GZ | Performed by: FAMILY MEDICINE

## 2024-09-11 PROCEDURE — 85027 COMPLETE CBC AUTOMATED: CPT | Mod: GZ | Performed by: FAMILY MEDICINE

## 2024-09-11 PROCEDURE — 99213 OFFICE O/P EST LOW 20 MIN: CPT | Mod: 25 | Performed by: FAMILY MEDICINE

## 2024-09-11 PROCEDURE — 84443 ASSAY THYROID STIM HORMONE: CPT | Mod: GZ | Performed by: FAMILY MEDICINE

## 2024-09-11 PROCEDURE — 82728 ASSAY OF FERRITIN: CPT | Mod: GZ | Performed by: FAMILY MEDICINE

## 2024-09-11 PROCEDURE — 83036 HEMOGLOBIN GLYCOSYLATED A1C: CPT | Mod: GZ | Performed by: FAMILY MEDICINE

## 2024-09-11 PROCEDURE — 90677 PCV20 VACCINE IM: CPT | Performed by: FAMILY MEDICINE

## 2024-09-11 PROCEDURE — 80053 COMPREHEN METABOLIC PANEL: CPT | Mod: GZ | Performed by: FAMILY MEDICINE

## 2024-09-11 PROCEDURE — 36415 COLL VENOUS BLD VENIPUNCTURE: CPT | Mod: GZ | Performed by: FAMILY MEDICINE

## 2024-09-11 PROCEDURE — G0402 INITIAL PREVENTIVE EXAM: HCPCS | Performed by: FAMILY MEDICINE

## 2024-09-11 RX ORDER — ACYCLOVIR 50 MG/G
CREAM TOPICAL PRN
COMMUNITY
End: 2024-09-11

## 2024-09-11 RX ORDER — ACYCLOVIR 50 MG/G
CREAM TOPICAL
Qty: 20 G | Refills: 1 | Status: SHIPPED | OUTPATIENT
Start: 2024-09-11

## 2024-09-11 RX ORDER — ACYCLOVIR 50 MG/G
CREAM TOPICAL PRN
Status: CANCELLED | OUTPATIENT
Start: 2024-09-11

## 2024-09-11 RX ORDER — VALACYCLOVIR HYDROCHLORIDE 500 MG/1
TABLET, FILM COATED ORAL
Qty: 12 TABLET | Refills: 3 | Status: SHIPPED | OUTPATIENT
Start: 2024-09-11 | End: 2024-09-11

## 2024-09-11 ASSESSMENT — PAIN SCALES - GENERAL: PAINLEVEL: MILD PAIN (3)

## 2024-09-11 ASSESSMENT — PATIENT HEALTH QUESTIONNAIRE - PHQ9
SUM OF ALL RESPONSES TO PHQ QUESTIONS 1-9: 0
5. POOR APPETITE OR OVEREATING: SEVERAL DAYS

## 2024-09-11 ASSESSMENT — ANXIETY QUESTIONNAIRES
3. WORRYING TOO MUCH ABOUT DIFFERENT THINGS: NOT AT ALL
IF YOU CHECKED OFF ANY PROBLEMS ON THIS QUESTIONNAIRE, HOW DIFFICULT HAVE THESE PROBLEMS MADE IT FOR YOU TO DO YOUR WORK, TAKE CARE OF THINGS AT HOME, OR GET ALONG WITH OTHER PEOPLE: NOT DIFFICULT AT ALL
1. FEELING NERVOUS, ANXIOUS, OR ON EDGE: SEVERAL DAYS
7. FEELING AFRAID AS IF SOMETHING AWFUL MIGHT HAPPEN: NOT AT ALL
5. BEING SO RESTLESS THAT IT IS HARD TO SIT STILL: NOT AT ALL
6. BECOMING EASILY ANNOYED OR IRRITABLE: NOT AT ALL
GAD7 TOTAL SCORE: 2
2. NOT BEING ABLE TO STOP OR CONTROL WORRYING: NOT AT ALL
GAD7 TOTAL SCORE: 2

## 2024-09-11 NOTE — NURSING NOTE
Patient identified using two patient identifiers.  Ear exam showing wax occlusion completed by provider.  Solution: warm water was placed in the bilateral ear(s) via irrigation tool: elephant ear.   Patient tolerated well and had no complications.

## 2024-09-11 NOTE — PROGRESS NOTES
Preventive Care Visit  [facility] CATHY More MD, Family Medicine  Sep 11, 2024      Assessment & Plan     Welcome to Medicare preventive visit  Screening fasting labs ordered  - Lipid panel reflex to direct LDL Fasting; Future  - CBC with platelets; Future  - Comprehensive metabolic panel; Future  - Ferritin; Future  - Vitamin B12; Future  - Folate; Future  - TSH with free T4 reflex; Future  - Hemoglobin A1c; Future  - Lipid panel reflex to direct LDL Fasting  - CBC with platelets  - Comprehensive metabolic panel  - Ferritin  - Vitamin B12  - Folate  - TSH with free T4 reflex  - Hemoglobin A1c    HSV infection  Patient reports that she gets herpetic rash outbreak off-and-on on her hand.  For that she uses acyclovir ointment.  Refill medication ordered  - acyclovir (ZOVIRAX) 5 % external cream; Apply a 1/2-inch ribbon of ointment per 4-inch square surface area 6 times daily (every ~3 hours) for 7 days. Initiate as early as possible following onset of signs and symptoms.    Bilateral impacted cerumen  Patient is noticing decreased hearing bilaterally.  Noted to have impacted cerumen bilaterally.  Recommending an ear wash which she agreed to.  Orders placed for ear irrigation bilaterally.  If improvement is not noted, I have recommended to get hearing test done.  Also recommend to not use Q-tips  - REMOVE IMPACTED CERUMEN    Asymptomatic postmenopausal state  Start taking calcium vitamin D supplements  - DEXA HIP/PELVIS/SPINE - Future; Future    Encounter for screening mammogram for breast cancer    - MA Screen Bilateral w/Dl; Future        Counseling  Appropriate preventive services were addressed with this patient via screening, questionnaire, or discussion as appropriate for  nutrition and physical activity.    Checklist reviewing preventive services available has been discussed with the patient.          Juanpablo Virgen is a 64 year old, presenting for the following:  Medicare Visit  (Vitamin D lab) and Knee Pain        9/11/2024     7:26 AM   Additional Questions   Roomed by Loly HARRIS   Accompanied by Spouse        Health Care Directive  Patient does not have a Health Care Directive or Living Will: Patient states has Advance Directive and will bring in a copy to clinic.    HPI  Pt has vision and hearing concerns. Last vision exam and rx glasses are from spring of 2023          9/11/2024   General Health   How would you rate your overall physical health? Good   Feel stress (tense, anxious, or unable to sleep) To some extent      (!) STRESS CONCERN      9/11/2024   Nutrition   Diet: Regular (no restrictions)            9/11/2024   Exercise   Days per week of moderate/strenous exercise 3 days            9/11/2024   Social Factors   Frequency of gathering with friends or relatives Once a week   Worry food won't last until get money to buy more No   Food not last or not have enough money for food? No   Do you have housing? (Housing is defined as stable permanent housing and does not include staying ouside in a car, in a tent, in an abandoned building, in an overnight shelter, or couch-surfing.) Yes   Are you worried about losing your housing? No   Lack of transportation? No   Unable to get utilities (heat,electricity)? No            9/11/2024   Fall Risk   Fallen 2 or more times in the past year? No   Trouble with walking or balance? No             9/11/2024   Activities of Daily Living- Home Safety   Needs help with the following daily activites None of the above   Safety concerns in the home None of the above            9/11/2024   Dental   Dentist two times every year? Yes            9/11/2024   Hearing Screening   Hearing concerns? (!) I FEEL THAT PEOPLE ARE MUMBLING OR NOT SPEAKING CLEARLY.    (!) I NEED TO ASK PEOPLE TO SPEAK UP OR REPEAT THEMSELVES.       Multiple values from one day are sorted in reverse-chronological order         9/11/2024   Driving Risk Screening   Patient/family members  have concerns about driving No            9/11/2024   General Alertness/Fatigue Screening   Have you been more tired than usual lately? No            9/11/2024   Urinary Incontinence Screening   Bothered by leaking urine in past 6 months No            3/20/2024   TB Screening   Were you born outside of the [place]? Yes            Today's PHQ-2 Score:       9/11/2024     7:15 AM   PHQ-2 ( 1999 Pf[employer])   Q1: Little interest or pleasure in doing things 0   Q2: Feeling down, depressed or hopeless 0   PHQ-2 Score 0   Q1: Little interest or pleasure in doing things Not at all   Q2: Feeling down, depressed or hopeless Not at all   PHQ-2 Score 0           9/11/2024   Substance Use   Alcohol more than 3/day or more than 7/wk No   Do you have a current opioid prescription? No   How severe/bad is pain from 1 to 10? 3/10   Do you use any other substances recreationally? No        Social History     Tobacco Use    Smoking status: Never    Smokeless tobacco: Never   Vaping Use    Vaping status: Never Used   Substance Use Topics    Alcohol use: No     Alcohol/week: 0.0 standard drinks of alcohol    Drug use: No           4/27/2023   LAST FHS-7 RESULTS   1st degree relative breast or ovarian cancer No   Any relative bilateral breast cancer No   Any male have breast cancer No   Any ONE woman have BOTH breast AND ovarian cancer No   Any woman with breast cancer before 50yrs No   2 or more relatives with breast AND/OR ovarian cancer No   2 or more relatives with breast AND/OR bowel cancer No                 History of abnormal Pap smear:         Latest Ref Rng & Units 6/5/2023     9:42 AM 10/28/2015    12:05 PM   PAP / HPV   PAP  Negative for Intraepithelial Lesion or Malignancy (NILM)     PAP (Historical)   NIL    HPV 16 DNA Negative Negative  Negative    HPV 18 DNA Negative Negative  Negative    Other HR HPV Negative Negative  Negative      ASCVD Risk   The 10-year ASCVD risk score (Monroe CHAUDHARY, et al., 2019) is: 3.9%    Values  used to calculate the score:      Age: 64 years      Sex: Female      Is Non- : No      Diabetic: No      Tobacco smoker: No      Systolic Blood Pressure: 112 mmHg      Is BP treated: No      HDL Cholesterol: 82 mg/dL      Total Cholesterol: 227 mg/dL            Reviewed and updated as needed this visit by Provider    Allergies                 Patient Active Problem List   Diagnosis    CARDIOVASCULAR SCREENING; LDL GOAL LESS THAN 160    History of renal stone    Shoulder pain    Greater tuberosity of humerus fracture    Small bowel obstruction (H)    Esophageal reflux     Past Surgical History:   Procedure Laterality Date    COLONOSCOPY N/A 9/22/2021    Procedure: COLONOSCOPY;  Surgeon: Edwina Smith MD;  Location:  GI    None         Social History     Tobacco Use    Smoking status: Never    Smokeless tobacco: Never   Substance Use Topics    Alcohol use: No     Alcohol/week: 0.0 standard drinks of alcohol     Family History   Problem Relation Age of Onset    Hypertension Mother     Diabetes Mother     Diabetes Father          Current Outpatient Medications   Medication Sig Dispense Refill    acyclovir (ZOVIRAX) 5 % external cream Apply a 1/2-inch ribbon of ointment per 4-inch square surface area 6 times daily (every ~3 hours) for 7 days. Initiate as early as possible following onset of signs and symptoms. 20 g 1    cyanocobalamin 1000 MCG sublingual tablet Place under the tongue daily      omega 3 1000 MG CAPS        Allergies   Allergen Reactions    No Known Allergies      Current providers sharing in care for this patient include:  Patient Care Team:  Roxanna Jones MD as PCP - General (Internal Medicine)  Roxanna Jones MD as Assigned PCP  Page Raya MD as Fellow (Gastroenterology)  Yomaira Mack MD as Assigned OBGYN Provider  Maynor Lafleur MD as Assigned Surgical Provider    The following health maintenance items are reviewed in Epic and  correct as of today:  Health Maintenance   Topic Date Due    DEXA  Never done    HEPATITIS A IMMUNIZATION (1 of 2 - Risk 2-dose series) Never done    ZOSTER IMMUNIZATION (1 of 2) Never done    HEPATITIS B IMMUNIZATION (1 of 3 - Risk 3-dose series) Never done    RSV VACCINE (1 - Risk 60-74 years 1-dose series) Never done    INFLUENZA VACCINE (1) Never done    COVID-19 Vaccine (4 - 2023-24 season) 09/01/2024    DTAP/TDAP/TD IMMUNIZATION (3 - Td or Tdap) 10/31/2024    MEDICARE ANNUAL WELLNESS VISIT  03/20/2025    ANNUAL REVIEW OF HM ORDERS  03/20/2025    MAMMO SCREENING  04/27/2025    GLUCOSE  05/16/2025    FALL RISK ASSESSMENT  09/11/2025    LIPID  05/16/2027    ADVANCE CARE PLANNING  09/11/2029    COLORECTAL CANCER SCREENING  09/22/2031    HIV SCREENING  Completed    PHQ-2 (once per calendar year)  Completed    Pneumococcal Vaccine: 65+ Years  Completed    HPV IMMUNIZATION  Aged Out    MENINGITIS IMMUNIZATION  Aged Out    RSV MONOCLONAL ANTIBODY  Aged Out    PAP  Discontinued         Review of Systems  CONSTITUTIONAL: NEGATIVE for fever, chills, change in weight  INTEGUMENTARY/SKIN: NEGATIVE for worrisome rashes, moles or lesions  EYES: NEGATIVE for vision changes or irritation  ENT/MOUTH: NEGATIVE for ear, mouth and throat problems  RESP: NEGATIVE for significant cough or SOB  BREAST: NEGATIVE for masses, tenderness or discharge  CV: NEGATIVE for chest pain, palpitations or peripheral edema  GI: NEGATIVE for nausea, abdominal pain, heartburn, or change in bowel habits  : NEGATIVE for frequency, dysuria, or hematuria  MUSCULOSKELETAL: NEGATIVE for significant arthralgias or myalgia  NEURO: NEGATIVE for weakness, dizziness or paresthesias  ENDOCRINE: NEGATIVE for temperature intolerance, skin/hair changes  HEME: NEGATIVE for bleeding problems  PSYCHIATRIC: NEGATIVE for changes in mood or affect     Objective    Exam  /66 (BP Location: Right arm, Patient Position: Sitting, Cuff Size: Adult Regular)   Pulse 69   " Temp 97.6  F (36.4  C) (Temporal)   Resp 14   Ht 1.551 m (5' 1.06\")   Wt 57.6 kg (127 lb)   LMP  (LMP Unknown)   SpO2 98%   BMI 23.95 kg/m     Estimated body mass index is 23.95 kg/m  as calculated from the following:    Height as of this encounter: 1.551 m (5' 1.06\").    Weight as of this encounter: 57.6 kg (127 lb).    Physical Exam  GENERAL: alert and no distress  EYES: Eyes grossly normal to inspection, PERRL and conjunctivae and sclerae normal  HENT: ear canals and TM's normal, nose and mouth without ulcers or lesions  NECK: no adenopathy, no asymmetry, masses, or scars  RESP: lungs clear to auscultation - no rales, rhonchi or wheezes  BREAST: normal without masses, tenderness or nipple discharge and no palpable axillary masses or adenopathy  CV: regular rate and rhythm, normal S1 S2, no S3 or S4, no murmur, click or rub, no peripheral edema  ABDOMEN: soft, nontender, no hepatosplenomegaly, no masses and bowel sounds normal  MS: no gross musculoskeletal defects noted, no edema  SKIN: no suspicious lesions or rashes  NEURO: Normal strength and tone, mentation intact and speech normal  PSYCH: mentation appears normal, affect normal/bright         9/11/2024   Mini Cog   Clock Draw Score 2 Normal   3 Item Recall 3 objects recalled   Mini Cog Total Score 5            Vision Screen  Patient wears corrective lenses (select all that apply): Worn during vision screen  .  Vision Screen Results      9/11/2024     7:28 AM   Vision Screening Results   Does the patient have corrective lenses (glasses/contacts)? Yes   Patient wears corrective lenses (select all that apply) Worn during vision screen   Vision Acuity Tool HOTV   RIGHT EYE 10/20 (20/40)   LEFT EYE 10/20 (20/40)   Is there a two line difference? No         Signed Electronically by: Yane More MD    "

## 2024-12-12 ENCOUNTER — PATIENT OUTREACH (OUTPATIENT)
Dept: CARE COORDINATION | Facility: CLINIC | Age: 64
End: 2024-12-12
Payer: COMMERCIAL

## 2025-01-15 ENCOUNTER — TRANSFERRED RECORDS (OUTPATIENT)
Dept: HEALTH INFORMATION MANAGEMENT | Facility: CLINIC | Age: 65
End: 2025-01-15
Payer: COMMERCIAL

## 2025-01-16 DIAGNOSIS — E55.9 VITAMIN D DEFICIENCY: ICD-10-CM

## 2025-01-16 DIAGNOSIS — L65.0 TELOGEN EFFLUVIUM: Primary | ICD-10-CM

## 2025-01-29 ENCOUNTER — LAB (OUTPATIENT)
Dept: LAB | Facility: CLINIC | Age: 65
End: 2025-01-29
Payer: COMMERCIAL

## 2025-01-29 DIAGNOSIS — L65.0 TELOGEN EFFLUVIUM: ICD-10-CM

## 2025-01-29 DIAGNOSIS — E55.9 VITAMIN D DEFICIENCY: ICD-10-CM

## 2025-01-29 LAB
PROT SERPL-MCNC: 7.7 G/DL (ref 6.4–8.3)
VIT D+METAB SERPL-MCNC: 34 NG/ML (ref 20–50)

## 2025-01-29 PROCEDURE — 82306 VITAMIN D 25 HYDROXY: CPT

## 2025-01-29 PROCEDURE — 84155 ASSAY OF PROTEIN SERUM: CPT

## 2025-01-29 PROCEDURE — 36415 COLL VENOUS BLD VENIPUNCTURE: CPT

## 2025-02-04 ENCOUNTER — DOCUMENTATION ONLY (OUTPATIENT)
Dept: OTHER | Facility: CLINIC | Age: 65
End: 2025-02-04
Payer: COMMERCIAL

## 2025-02-12 ENCOUNTER — NURSE TRIAGE (OUTPATIENT)
Dept: FAMILY MEDICINE | Facility: CLINIC | Age: 65
End: 2025-02-12
Payer: COMMERCIAL

## 2025-02-12 NOTE — TELEPHONE ENCOUNTER
"Pt agreed to Disposition - to be seen in OFFICE TODAY - and given lack of OV time, pt elected to visit [facility].    Lorene Coulter RN    1. TEMPERATURE: \"What is the most recent temperature?\"  \"How was it measured?\"       100.1F   2. ONSET: \"When did the fever start?\"       Today  3. CHILLS: \"Do you have chills?\" If yes: \"How bad are they?\"  (e.g., none, mild, moderate, severe)      Chills present  4. OTHER SYMPTOMS: \"Do you have any other symptoms besides the fever?\"  (e.g., abdomen pain, cough, diarrhea, earache, headache, sore throat, urination pain)      Sore thoat, body aches  5. CAUSE: If there are no symptoms, ask: \"What do you think is causing the fever?\"       Unknown  6. CONTACTS: \"Does anyone else in the family have an infection?\"      Denies  7. TREATMENT: \"What have you done so far to treat this fever?\" (e.g., OTC fever medicines)      Acetaminophen 2 tabs (1000mg total) taken at 1250p  8. IMMUNOCOMPROMISE: \"Do you have of the following: diabetes, HIV positive, splenectomy, cancer chemotherapy, chronic steroid treatment, transplant patient, etc.?\"      Denies  9. PREGNANCY: \"Is there any chance you are pregnant?\" \"When was your last menstrual period?\"      --  10. TRAVEL: \"Have you traveled out of the country in the last month?\" (e.g., travel history, exposures)        Denies      Reason for Disposition   SEVERE chills (i.e., feeling extremely cold WITH shaking chills)    Additional Information   Negative: Difficult to awaken or acting confused (e.g., disoriented, slurred speech)   Negative: Pale cold skin and very weak (can't stand)   Negative: Difficulty breathing and bluish (or gray) lips or face   Negative: New-onset rash with purple (or blood-colored) spots or dots   Negative: Sounds like a life-threatening emergency to the triager   Negative: Taking antibiotic for infection (follow-up call)   Negative: Pregnant   Negative: Postpartum (from 0 to 6 weeks after delivery)   Negative: Fever onset " within 24 hours of receiving vaccine   Negative: Fever within 14 days of COVID-19 Exposure   Negative: Follow-up call after recent diagnosis or treatment by a doctor (or NP/PA)   Negative: Other symptom is present, use that symptom guideline first (e.g., Diarrhea, Sore Throat).   Negative: Headache and stiff neck (can't touch chin to chest)   Negative: Difficulty breathing   Negative: IV Drug Use (IVDU)   Negative: Fever > 104 F (40 C)   Negative: Fever > 100 F (37.8 C) and indwelling urinary catheter (e.g., Holliday, coude)   Negative: Fever > 100 F (37.8 C) and has port (portacath), central line, or PICC line   Negative: Drinking very little and dehydration suspected (e.g., no urine > 12 hours, very dry mouth, very lightheaded)   Negative: Patient sounds very sick or weak to the triager   Negative: Fever > 101 F (38.3 C) and over 60 years of age   Negative: Fever > 100 F (37.8 C) and diabetes mellitus or a weak immune system (e.g., HIV positive, chemotherapy, splenectomy)   Negative: Fever > 100 F (37.8 C) and bedridden (e.g., CVA, chronic illness, recovering from surgery)   Negative: Fever > 100.4 F (38.0 C) and surgery in the past month   Negative: Transplant patient (e.g., liver, heart, lung, kidney)   Negative: Widespread rash and cause unknown    Protocols used: Fever-A-OH

## 2025-02-13 ENCOUNTER — NURSE TRIAGE (OUTPATIENT)
Dept: NURSING | Facility: CLINIC | Age: 65
End: 2025-02-13
Payer: COMMERCIAL

## 2025-02-14 NOTE — TELEPHONE ENCOUNTER
Triage Call:    Patient and spouse calling with medication question. Patient reports fever, oral temperature of 101.5. She reports taking DayQuil at 1500 and wondering if she is able to take anything for her current fever.     Informed patient that since it has been nearly 7 hours since taking DayQuil, she is okay to take either acetaminophen or ibuprofen.     No additional concerns or questions.    Katya Brown RN  02/13/25 10:05 PM  [facility] Nurse Advisor              Reason for Disposition   Caller has medicine question only, adult not sick, AND triager answers question    Additional Information   Negative: [1] Intentional drug overdose AND [2] suicidal thoughts or ideas   Negative: Drug overdose and triager unable to answer question   Negative: Caller requesting a renewal or refill of a medicine patient is currently taking   Negative: Caller requesting information unrelated to medicine   Negative: Caller requesting information about COVID-19 Vaccine   Negative: Caller requesting information about Emergency Contraception   Negative: Caller requesting information about Combined Birth Control Pills   Negative: Caller requesting information about Progestin Birth Control Pills   Negative: Caller requesting information about Post-Op pain or medicines   Negative: Caller requesting a prescription antibiotic (such as Penicillin) for Strep throat and has a positive culture result   Negative: Caller requesting a prescription anti-viral med (such as Tamiflu) and has influenza (flu) symptoms   Negative: Immunization reaction suspected   Negative: Rash while taking a medicine or within 3 days of stopping it   Negative: [1] Asthma and [2] having symptoms of asthma (cough, wheezing, etc.)   Negative: [1] Symptom of illness (e.g., headache, abdominal pain, earache, vomiting) AND [2] more than mild   Negative: Breastfeeding questions about mother's medicines and diet   Negative: MORE THAN A DOUBLE DOSE of a  prescription or over-the-counter (OTC) drug   Negative: [1] DOUBLE DOSE (an extra dose or lesser amount) of prescription drug AND [2] any symptoms (e.g., dizziness, nausea, pain, sleepiness)   Negative: [1] DOUBLE DOSE (an extra dose or lesser amount) of over-the-counter (OTC) drug AND [2] any symptoms (e.g., dizziness, nausea, pain, sleepiness)   Negative: Took another person's prescription drug   Negative: [1] DOUBLE DOSE (an extra dose or lesser amount) of prescription drug AND [2] NO symptoms  (Exception: A double dose of antibiotics.)   Negative: Diabetes drug error or overdose (e.g., took wrong type of insulin or took extra dose)   Negative: [1] Prescription not at pharmacy AND [2] was prescribed by PCP recently (Exception: Triager has access to EMR and prescription is recorded there. Go to Home Care and confirm for pharmacy.)   Negative: [1] Pharmacy calling with prescription question AND [2] triager unable to answer question   Negative: [1] Caller has URGENT medicine question about med that PCP or specialist prescribed AND [2] triager unable to answer question   Negative: Medicine patch causing local rash or itching   Negative: [1] Caller has medicine question about med NOT prescribed by PCP AND [2] triager unable to answer question (e.g., compatibility with other med, storage)   Negative: Prescription request for new medicine (not a refill)   Negative: [1] Caller has NON-URGENT medicine question about med that PCP prescribed AND [2] triager unable to answer question   Negative: Caller wants to use a complementary or alternative medicine   Negative: [1] Prescription prescribed recently is not at pharmacy AND [2] triager has access to patient's EMR AND [3] prescription is recorded in the EMR   Negative: [1] DOUBLE DOSE (an extra dose or lesser amount) of over-the-counter (OTC) drug AND [2] NO symptoms   Negative: [1] DOUBLE DOSE (an extra dose or lesser amount) of antibiotic drug AND [2] NO  symptoms    Protocols used: Medication Question Call-A-AH

## 2025-02-15 ENCOUNTER — OFFICE VISIT (OUTPATIENT)
Dept: URGENT CARE | Facility: URGENT CARE | Age: 65
End: 2025-02-15
Payer: COMMERCIAL

## 2025-02-15 VITALS
SYSTOLIC BLOOD PRESSURE: 122 MMHG | DIASTOLIC BLOOD PRESSURE: 77 MMHG | WEIGHT: 134 LBS | RESPIRATION RATE: 20 BRPM | OXYGEN SATURATION: 97 % | BODY MASS INDEX: 25.27 KG/M2 | HEART RATE: 112 BPM | TEMPERATURE: 100.9 F

## 2025-02-15 DIAGNOSIS — R07.0 THROAT PAIN: ICD-10-CM

## 2025-02-15 DIAGNOSIS — J10.1 INFLUENZA A: ICD-10-CM

## 2025-02-15 DIAGNOSIS — R50.9 FEVER AND CHILLS: Primary | ICD-10-CM

## 2025-02-15 LAB
DEPRECATED S PYO AG THROAT QL EIA: NEGATIVE
FLUAV AG SPEC QL IA: POSITIVE
FLUBV AG SPEC QL IA: NEGATIVE
S PYO DNA THROAT QL NAA+PROBE: NOT DETECTED

## 2025-02-15 PROCEDURE — 87804 INFLUENZA ASSAY W/OPTIC: CPT | Performed by: PHYSICIAN ASSISTANT

## 2025-02-15 PROCEDURE — 87651 STREP A DNA AMP PROBE: CPT | Performed by: PHYSICIAN ASSISTANT

## 2025-02-15 PROCEDURE — 99214 OFFICE O/P EST MOD 30 MIN: CPT | Performed by: PHYSICIAN ASSISTANT

## 2025-02-15 RX ORDER — BENZONATATE 200 MG/1
200 CAPSULE ORAL 3 TIMES DAILY PRN
Qty: 30 CAPSULE | Refills: 0 | Status: SHIPPED | OUTPATIENT
Start: 2025-02-15

## 2025-02-15 RX ORDER — LATANOPROST 50 UG/ML
SOLUTION/ DROPS OPHTHALMIC
COMMUNITY
Start: 2025-02-10

## 2025-02-15 ASSESSMENT — ENCOUNTER SYMPTOMS
SORE THROAT: 1
RHINORRHEA: 1
FEVER: 1
COUGH: 1
MYALGIAS: 1

## 2025-02-15 NOTE — PROGRESS NOTES
SUBJECTIVE:   Promise Nunez is a 64 year old female presenting with a chief complaint of   Chief Complaint   Patient presents with    Urgent Care     Pt present with throat pain, fever and chills, body aches, cough with phlegm, onset  4 days. Pt took home covid tests today and 2 days ago, both were negative.        She is an established patient of [facility].  Negative covid at home.  Patient presents with 4 days of symptoms.          Review of Systems   Constitutional:  Positive for fever.   HENT:  Positive for congestion, rhinorrhea and sore throat.    Respiratory:  Positive for cough.    Cardiovascular:  Positive for chest pain.   Musculoskeletal:  Positive for myalgias.   All other systems reviewed and are negative.      Past Medical History:   Diagnosis Date    Abnormal echocardiogram     She had CT coronary angiography which was normal    GERD (gastroesophageal reflux disease)     Screen for colon cancer 09/2021    nl     Family History   Problem Relation Age of Onset    Hypertension Mother     Diabetes Mother     Diabetes Father      Current Outpatient Medications   Medication Sig Dispense Refill    benzonatate (TESSALON) 200 MG capsule Take 1 capsule (200 mg) by mouth 3 times daily as needed for cough. 30 capsule 0    latanoprost (XALATAN) 0.005 % ophthalmic solution INSTILL 1 DROP IN BOTH EYES EVERY NIGHT AT BEDTIME. SEPARATE BY AT LEAST 10 MINUTES FROM OTHER INTRAOCULAR PRESSURE-REDUCING OPHTHALMIC DRUGS      omega 3 1000 MG CAPS       acyclovir (ZOVIRAX) 5 % external cream Apply a 1/2-inch ribbon of ointment per 4-inch square surface area 6 times daily (every ~3 hours) for 7 days. Initiate as early as possible following onset of signs and symptoms. 20 g 1    cyanocobalamin 1000 MCG sublingual tablet Place under the tongue daily (Patient not taking: Reported on 2/15/2025)       Social History     Tobacco Use    Smoking status: Never    Smokeless tobacco: Never   Substance Use Topics    Alcohol use: No      Alcohol/week: 0.0 standard drinks of alcohol       OBJECTIVE  /77   Pulse 112   Temp 100.9  F (38.3  C) (Tympanic)   Resp 20   Wt 60.8 kg (134 lb)   LMP  (LMP Unknown)   SpO2 97%   BMI 25.27 kg/m      Physical Exam  Vitals and nursing note reviewed.   Constitutional:       Appearance: Normal appearance. She is normal weight. She is ill-appearing.   HENT:      Head: Normocephalic and atraumatic.      Right Ear: Tympanic membrane, ear canal and external ear normal.      Left Ear: Tympanic membrane, ear canal and external ear normal.      Nose: Nose normal.      Mouth/Throat:      Mouth: Mucous membranes are moist.      Pharynx: Oropharynx is clear.   Eyes:      Extraocular Movements: Extraocular movements intact.      Conjunctiva/sclera: Conjunctivae normal.   Cardiovascular:      Rate and Rhythm: Normal rate and regular rhythm.      Pulses: Normal pulses.      Heart sounds: Normal heart sounds.   Pulmonary:      Effort: Pulmonary effort is normal.      Breath sounds: Normal breath sounds.   Musculoskeletal:      Cervical back: Normal range of motion.   Skin:     General: Skin is warm and dry.      Findings: No rash.   Neurological:      General: No focal deficit present.      Mental Status: She is alert.   Psychiatric:         Mood and Affect: Mood normal.         Behavior: Behavior normal.         Labs:  Results for orders placed or performed in visit on 02/15/25 (from the past 24 hours)   Streptococcus A Rapid Screen w/Reflex to PCR - Clinic Collect    Specimen: Throat; Swab   Result Value Ref Range    Group A Strep antigen Negative Negative   Influenza A/B antigen    Specimen: Nose; Swab   Result Value Ref Range    Influenza A antigen Positive (A) Negative    Influenza B antigen Negative Negative    Narrative    Test results must be correlated with clinical data. If necessary, results should be confirmed by a molecular assay or viral culture.       ASSESSMENT:      ICD-10-CM    1. Fever and chills   R50.9 Influenza A/B antigen      2. Throat pain  R07.0 Streptococcus A Rapid Screen w/Reflex to PCR - Clinic Collect     Influenza A/B antigen     Group A Streptococcus PCR Throat Swab      3. Influenza A  J10.1 benzonatate (TESSALON) 200 MG capsule           Medical Decision Making:    Differential Diagnosis:  URI Adult/Peds:  Bronchitis-viral, Influenza, and Pneumonia    Serious Comorbid Conditions:  Adult:   reviewed    PLAN:    Rx for tessalon perles.  Tylenol/motrin prn.  Drink plenty of water.  Discussed reasons to seek immediate medical attention.  Additionally if no improvement or worsening in one week, may follow up with PCP and/or UC.    Discussed expected course.      Followup:    If not improving or if condition worsens, follow up with your Primary Care Provider, If not improving or if conditions worsens over the next 12-24 hours, go to the Emergency Department    There are no Patient Instructions on file for this visit.       12-Mar-2023 22:21

## 2025-02-17 ENCOUNTER — TELEPHONE (OUTPATIENT)
Dept: FAMILY MEDICINE | Facility: CLINIC | Age: 65
End: 2025-02-17

## 2025-02-17 ENCOUNTER — NURSE TRIAGE (OUTPATIENT)
Dept: FAMILY MEDICINE | Facility: CLINIC | Age: 65
End: 2025-02-17

## 2025-02-17 ENCOUNTER — OFFICE VISIT (OUTPATIENT)
Dept: PEDIATRICS | Facility: CLINIC | Age: 65
End: 2025-02-17
Payer: COMMERCIAL

## 2025-02-17 ENCOUNTER — ANCILLARY PROCEDURE (OUTPATIENT)
Dept: GENERAL RADIOLOGY | Facility: CLINIC | Age: 65
End: 2025-02-17
Attending: FAMILY MEDICINE
Payer: COMMERCIAL

## 2025-02-17 VITALS
SYSTOLIC BLOOD PRESSURE: 121 MMHG | DIASTOLIC BLOOD PRESSURE: 74 MMHG | HEART RATE: 98 BPM | RESPIRATION RATE: 16 BRPM | OXYGEN SATURATION: 99 % | BODY MASS INDEX: 24.81 KG/M2 | TEMPERATURE: 99.2 F | WEIGHT: 131.4 LBS | HEIGHT: 61 IN

## 2025-02-17 DIAGNOSIS — J10.1 INFLUENZA A: Primary | ICD-10-CM

## 2025-02-17 DIAGNOSIS — J10.1 INFLUENZA A: ICD-10-CM

## 2025-02-17 LAB
ANION GAP SERPL CALCULATED.3IONS-SCNC: 8 MMOL/L (ref 3–14)
BASOPHILS # BLD AUTO: 0 10E3/UL (ref 0–0.2)
BASOPHILS NFR BLD AUTO: 0 %
BUN SERPL-MCNC: 9 MG/DL (ref 7–30)
CALCIUM SERPL-MCNC: 9.4 MG/DL (ref 8.5–10.1)
CHLORIDE BLD-SCNC: 107 MMOL/L (ref 94–109)
CO2 SERPL-SCNC: 27 MMOL/L (ref 20–32)
CREAT SERPL-MCNC: 0.7 MG/DL (ref 0.52–1.04)
EGFRCR SERPLBLD CKD-EPI 2021: >90 ML/MIN/1.73M2
EOSINOPHIL # BLD AUTO: 0 10E3/UL (ref 0–0.7)
EOSINOPHIL NFR BLD AUTO: 0 %
ERYTHROCYTE [DISTWIDTH] IN BLOOD BY AUTOMATED COUNT: 13.2 % (ref 10–15)
GLUCOSE BLD-MCNC: 103 MG/DL (ref 70–99)
HCT VFR BLD AUTO: 39.6 % (ref 35–47)
HGB BLD-MCNC: 12.7 G/DL (ref 11.7–15.7)
IMM GRANULOCYTES # BLD: 0 10E3/UL
IMM GRANULOCYTES NFR BLD: 0 %
LYMPHOCYTES # BLD AUTO: 1.8 10E3/UL (ref 0.8–5.3)
LYMPHOCYTES NFR BLD AUTO: 44 %
MCH RBC QN AUTO: 28.7 PG (ref 26.5–33)
MCHC RBC AUTO-ENTMCNC: 32.1 G/DL (ref 31.5–36.5)
MCV RBC AUTO: 90 FL (ref 78–100)
MONOCYTES # BLD AUTO: 0.5 10E3/UL (ref 0–1.3)
MONOCYTES NFR BLD AUTO: 14 %
NEUTROPHILS # BLD AUTO: 1.7 10E3/UL (ref 1.6–8.3)
NEUTROPHILS NFR BLD AUTO: 42 %
PLATELET # BLD AUTO: 195 10E3/UL (ref 150–450)
POTASSIUM BLD-SCNC: 4.1 MMOL/L (ref 3.4–5.3)
RBC # BLD AUTO: 4.42 10E6/UL (ref 3.8–5.2)
SODIUM SERPL-SCNC: 142 MMOL/L (ref 135–145)
WBC # BLD AUTO: 4 10E3/UL (ref 4–11)

## 2025-02-17 PROCEDURE — 85025 COMPLETE CBC W/AUTO DIFF WBC: CPT | Performed by: FAMILY MEDICINE

## 2025-02-17 PROCEDURE — 71046 X-RAY EXAM CHEST 2 VIEWS: CPT | Mod: TC | Performed by: RADIOLOGY

## 2025-02-17 PROCEDURE — 80048 BASIC METABOLIC PNL TOTAL CA: CPT | Performed by: FAMILY MEDICINE

## 2025-02-17 PROCEDURE — 99215 OFFICE O/P EST HI 40 MIN: CPT | Performed by: FAMILY MEDICINE

## 2025-02-17 PROCEDURE — 36415 COLL VENOUS BLD VENIPUNCTURE: CPT | Performed by: FAMILY MEDICINE

## 2025-02-17 RX ORDER — BENZONATATE 100 MG/1
100 CAPSULE ORAL 3 TIMES DAILY PRN
Qty: 21 CAPSULE | Refills: 1 | Status: SHIPPED | OUTPATIENT
Start: 2025-02-17

## 2025-02-17 NOTE — TELEPHONE ENCOUNTER
Dr. Jones, please advise.    Pt and spouse calling for clarification around a note they saw on pt's imaging done at [facility] today 2/17:        Pt expressed concern about the note, and has questions about how to address.    Triage was able to schedule the pt for the following VV:    Feb 20, 2025 4:00 PM  Provider Visit with Roxanna Jones MD  [facility] ([facility] ) [phone]     Lorene Coulter RN

## 2025-02-17 NOTE — TELEPHONE ENCOUNTER
These are common to see with increasing age both on xray/ct imaging.     As she is not symptomatic currently recommend regular health maintenance checks with primary care    But if she is having chest pain with activity or unusual chest pain  ( outside of her current influenza illness) she should discuss further with PCP

## 2025-02-17 NOTE — TELEPHONE ENCOUNTER
Nurse Triage SBAR    Is this a 2nd Level Triage? YES, LICENSED PRACTITIONER REVIEW IS REQUIRED    Situation: Pt complains of chest pain, fever( 100.4 F) weakness, HA, cough and is aphonic.     Background: Pt was seen at  02/15 and tested positive for influenza. Pt has had symptoms for seven days. Writer unclear of breathing problems but pt reports worsening symptoms.     Assessment: Pt needs to be evaluated.     Protocol Recommended Disposition:   Go To ED/[facility] Now (Or To Office With PCP Approval)    Recommendation: OV today. Huddle with ADS.         Does the patient meet one of the following criteria for ADS visit consideration? No      Reason for Disposition   Patient sounds very sick or weak to the triager    Additional Information   Negative: SEVERE difficulty breathing (e.g., struggling for each breath, speaks in single words)   Negative: Bluish (or gray) lips or face   Negative: Shock suspected (e.g., cold/pale/clammy skin, too weak to stand, low BP, rapid pulse)   Negative: Sounds like a life-threatening emergency to the triager   Negative: Symptoms of COVID-19 (e.g., cough, fever, SOB, or others) and COVID-19 is widespread in the community   Negative: Sounds like a cold and there is no fever   Negative: Cough and there is no fever   Negative: Severe cough   Negative: Throat pain and there is no fever   Negative: Severe sore throat   Negative: Influenza vaccine reaction is suspected   Negative: Headache and stiff neck (can't touch chin to chest)   Negative: Chest pain  (Exception: MILD central chest pain, present only when coughing.)    Protocols used: Influenza (Flu) - Seasonal-A-[place]

## 2025-02-17 NOTE — PROGRESS NOTES
Assessment & Plan     Influenza A  - XR Chest 2 Views  - CBC with platelets and differential  - Basic metabolic panel  (Ca, Cl, CO2, Creat, Gluc, K, Na, BUN)  - CBC with platelets and differential  - Basic metabolic panel  (Ca, Cl, CO2, Creat, Gluc, K, Na, BUN)  - benzonatate (TESSALON) 100 MG capsule  Dispense: 21 capsule; Refill: 1     As there were concerns that symptoms may have not been improving over this timeframe we came to shared decision to proceed with blood work and imaging, per my assessment no evidence of pneumonia on lung imaging and without elevation of white blood count.  Renal function is intact without suggestion of dehydration.  Patient advised to drink 60 ounces of water/fluids a day to keep hydrated.  Tessalon provided to help with coughing fits    Follow-up in 2 days if not showing improvement, return sooner if worsening    See AVS summary for additional recommendations reviewed with patient during this visit.       45 minutes spent on the date of the encounter doing chart review, history and exam, documentation and further activities per the note.       Toney Abrams MD   [facility]    Juanpablo Virgen is a 64 year old female who presents to clinic today for the following health issues:  Chief Complaint   Patient presents with    URI     HPI  Patient comes in as a referral from nurse triage for evaluation of influenza symptoms lasting for 7 days now she presented to the urgent care on day 4 and therefore was not eligible for Tamiflu therapy she has no history of lung disease she presents today with ongoing weakness, headache  , loss of voice.    She continues to run low-grade fevers her  who is accompanying her is not currently sick.  No reported history of cardiac issues  She is using Robitussin for her cough symptoms    Patient has not had any issues with holding down fluids    Coughing fits happen sporadically can be quite violent      Patient Active Problem List  "   Diagnosis Date Noted    Small bowel obstruction (H) 12/25/2018     Priority: Medium    Shoulder pain 07/28/2016     Priority: Medium    Greater tuberosity of humerus fracture 07/28/2016     Priority: Medium    CARDIOVASCULAR SCREENING; LDL GOAL LESS THAN 160 10/22/2014     Priority: Medium    History of renal stone 06/11/2014     Priority: Medium    Esophageal reflux 08/15/2013     Priority: Medium     Overview:   Patient reports gastroesophageal reflux disease symptoms she has been taking Prilosec to control these         Current Outpatient Medications   Medication Sig Dispense Refill    benzonatate (TESSALON) 100 MG capsule Take 1 capsule (100 mg) by mouth 3 times daily as needed for cough. 21 capsule 1    benzonatate (TESSALON) 200 MG capsule Take 1 capsule (200 mg) by mouth 3 times daily as needed for cough. 30 capsule 0    cyanocobalamin 1000 MCG sublingual tablet Place under the tongue daily.      latanoprost (XALATAN) 0.005 % ophthalmic solution INSTILL 1 DROP IN BOTH EYES EVERY NIGHT AT BEDTIME. SEPARATE BY AT LEAST 10 MINUTES FROM OTHER INTRAOCULAR PRESSURE-REDUCING OPHTHALMIC DRUGS      omega 3 1000 MG CAPS        No current facility-administered medications for this visit.           Objective    /74 (BP Location: Left arm, Patient Position: Sitting, Cuff Size: Adult Regular)   Pulse 98   Temp 99.2  F (37.3  C)   Resp 16   Ht 1.551 m (5' 1.06\")   Wt 59.6 kg (131 lb 6.4 oz)   LMP  (LMP Unknown)   SpO2 99%   BMI 24.78 kg/m    Physical Exam   As noted above and including:   GEN: NAD  CV: HDS, RRR no m/r/g  Pulm: non-labored, no obvious crackles/wheezing  Gait: normal  Mouth: no enlarged tonsils, no erythema, moist mucous membranes    Results for orders placed or performed in visit on 02/17/25   XR Chest 2 Views     Status: None    Narrative    EXAM: XR CHEST 2 VIEWS  LOCATION: [facility]  DATE: 2/17/2025    INDICATION: Flu for 7 days, not improving.  COMPARISON: None.      " Impression    IMPRESSION: Mild hyperinflation both lungs. The lungs are otherwise clear. No pleural effusions. Aortic calcification. Heart size upper limits of normal. Pulmonary vascularity is within normal limits.   Results for orders placed or performed in visit on 02/17/25   Basic metabolic panel  (Ca, Cl, CO2, Creat, Gluc, K, Na, BUN)     Status: Abnormal   Result Value Ref Range    Sodium 142 135 - 145 mmol/L    Potassium 4.1 3.4 - 5.3 mmol/L    Chloride 107 94 - 109 mmol/L    Carbon Dioxide (CO2) 27 20 - 32 mmol/L    Anion Gap 8 3 - 14 mmol/L    Urea Nitrogen 9 7 - 30 mg/dL    Creatinine 0.70 0.52 - 1.04 mg/dL    GFR Estimate >90 >60 mL/min/1.73m2    Calcium 9.4 8.5 - 10.1 mg/dL    Glucose 103 (H) 70 - 99 mg/dL   CBC with platelets and differential     Status: None   Result Value Ref Range    WBC Count 4.0 4.0 - 11.0 10e3/uL    RBC Count 4.42 3.80 - 5.20 10e6/uL    Hemoglobin 12.7 11.7 - 15.7 g/dL    Hematocrit 39.6 35.0 - 47.0 %    MCV 90 78 - 100 fL    MCH 28.7 26.5 - 33.0 pg    MCHC 32.1 31.5 - 36.5 g/dL    RDW 13.2 10.0 - 15.0 %    Platelet Count 195 150 - 450 10e3/uL    % Neutrophils 42 %    % Lymphocytes 44 %    % Monocytes 14 %    % Eosinophils 0 %    % Basophils 0 %    % Immature Granulocytes 0 %    Absolute Neutrophils 1.7 1.6 - 8.3 10e3/uL    Absolute Lymphocytes 1.8 0.8 - 5.3 10e3/uL    Absolute Monocytes 0.5 0.0 - 1.3 10e3/uL    Absolute Eosinophils 0.0 0.0 - 0.7 10e3/uL    Absolute Basophils 0.0 0.0 - 0.2 10e3/uL    Absolute Immature Granulocytes 0.0 <=0.4 10e3/uL   CBC with platelets and differential     Status: None    Narrative    The following orders were created for panel order CBC with platelets and differential.  Procedure                               Abnormality         Status                     ---------                               -----------         ------                     CBC with platelets and d...[[id number]]                      Final result                 Please view results  for these tests on the individual orders.                     The use of Dragon/PowerMic dictation services may have been used to construct the content in this note; any grammatical or spelling errors are non-intentional. Please contact the author of this note directly if you are in need of any clarification.

## 2025-02-17 NOTE — PATIENT INSTRUCTIONS
Tylenol 500mg every 6 hours for mild discomfort      For cough (can take all of them together):   - Dextromethorphan 'DM' (over the counter - can be found in Robitussin/Delsym/generic cough meds)  - Honey: lozenges/cough drops or mix honey in warm water  - Tessalon/Benzonatate (prescription) every 8 hours as needed      If you develop a new fever, increasing discomfort, shortness of breath, lightheadedness or issues with holding down fluids please seek help right away

## 2025-02-17 NOTE — PROGRESS NOTES
Acute and Diagnostic Services Clinic Visit    {PROVIDER CHARTING PREFERENCE:702806}    Juanpablo Virgen is a 64 year old, presenting for the following health issues:  No chief complaint on file.    HPI     Acute Illness  Acute illness concerns: Flu, weakness  Onset/Duration: February 11  Symptoms:  Fever: YES  Chills/Sweats: YES  Headache (location?): YES, frontal  Sinus Pressure: No           Decreased energy level: YES  Conjunctivitis:  No  Ear Pain: no  Rhinorrhea: No  Congestion: YES  Sore Throat: YES  Cough: YES-non-productive  Wheeze: No           Breathing fast: No  Decreased Appetite/Intake: hydrating well but not eating as much  Nausea: No  Vomiting: No  Diarrhea: No  Genitourinary symptoms: No  Progression of symptoms: same  Sick/Strep Exposure: YES- Influenza A  Therapies tried and outcome: Tessalon      {ROS Picklists (Optional):434064}      Objective    LMP  (LMP Unknown)   There is no height or weight on file to calculate BMI.  Physical Exam   {Exam List (Optional):651819}    {Diagnostic Test Results (Optional):518879}        Signed Electronically by: Toney Abrams MD  {Email feedback regarding this note to [email or web address]   :185005}

## 2025-02-20 ENCOUNTER — VIRTUAL VISIT (OUTPATIENT)
Dept: FAMILY MEDICINE | Facility: CLINIC | Age: 65
End: 2025-02-20
Payer: COMMERCIAL

## 2025-02-20 DIAGNOSIS — R73.03 PREDIABETES: ICD-10-CM

## 2025-02-20 DIAGNOSIS — B00.9 HSV INFECTION: ICD-10-CM

## 2025-02-20 DIAGNOSIS — I70.0 AORTIC CALCIFICATION: ICD-10-CM

## 2025-02-20 DIAGNOSIS — J10.1 INFLUENZA A: ICD-10-CM

## 2025-02-20 DIAGNOSIS — E78.5 HYPERLIPIDEMIA LDL GOAL <100: Primary | ICD-10-CM

## 2025-02-20 PROCEDURE — 98014 SYNCH AUDIO-ONLY EST MOD 30: CPT | Performed by: INTERNAL MEDICINE

## 2025-02-20 RX ORDER — KETOCONAZOLE 20 MG/ML
SHAMPOO, SUSPENSION TOPICAL
COMMUNITY
Start: 2025-01-15

## 2025-02-20 RX ORDER — ATORVASTATIN CALCIUM 10 MG/1
10 TABLET, FILM COATED ORAL DAILY
Qty: 90 TABLET | Refills: 3 | Status: SHIPPED | OUTPATIENT
Start: 2025-02-20

## 2025-02-20 RX ORDER — ACYCLOVIR 50 MG/G
OINTMENT TOPICAL
COMMUNITY
End: 2025-02-22

## 2025-02-20 RX ORDER — VALACYCLOVIR HYDROCHLORIDE 500 MG/1
500 TABLET, FILM COATED ORAL 2 TIMES DAILY
COMMUNITY
End: 2025-02-22

## 2025-02-20 NOTE — PROGRESS NOTES
"Promise is a 64 year old who is being evaluated via a billable telephone visit.    How would you like to obtain your AVS? MyChart  If the video visit is dropped, the invitation should be resent by: Text to cell phone: [phone]  Will anyone else be joining your video visit? No  Originating Location (pt. Location): Home  {PROVIDER LOCATION On-site should be selected for visits conducted from your clinic location or adjoining [facility] hospital, academic office, or other nearby [facility] building. Off-site should be selected for all other provider locations, including home:242683}  Distant Location (provider location):  On-site  Telephone visit completed due to {audio only reason:953396}    {PROVIDER CHARTING PREFERENCE:092970}    Subjective   Promise is a 64 year old, presenting for the following health issues:  Results (Imaging ) and Refill Request (Acyclovir ointment and valtrex 500mg)  {(!) Visit Details have not yet been documented.  Please enter Visit Details and then use this list to pull in documentation. (Optional):543361}  History of Present Illness       Reason for visit:  To discuss my recent chest XRAY and labs   She is taking medications regularly.       {SUPERLIST (Optional):379111}  {additonal problems for provider to add (Optional):810820}    {ROS Picklists (Optional):440207}      Objective    Vitals - Patient Reported  Weight (Patient Reported): 56.7 kg (124 lb 14.4 oz)  Height (Patient Reported): 155.1 cm (5' 1.06\")  BMI (Based on Pt Reported Ht/Wt): 23.55        Physical Exam   General: Alert and no distress //Respiratory: No audible wheeze, cough, or shortness of breath // Psychiatric:  Appropriate affect, tone, and pace of words      {Diagnostic Test Results (Optional):937073}      Phone call duration: *** minutes  Signed Electronically by: Roxanna Jones MD  {Email feedback regarding this note to [email or web address]   :663345}  " 3    benzonatate (TESSALON) 100 MG capsule Take 1 capsule (100 mg) by mouth 3 times daily as needed for cough. 21 capsule 1    benzonatate (TESSALON) 200 MG capsule Take 1 capsule (200 mg) by mouth 3 times daily as needed for cough. 30 capsule 0    cyanocobalamin 1000 MCG sublingual tablet Place under the tongue daily.      ketoconazole (NIZORAL) 2 % external shampoo APPLY SHAMPOO TO SCALP AND RINSE TWO TIMES A WEEK      latanoprost (XALATAN) 0.005 % ophthalmic solution INSTILL 1 DROP IN BOTH EYES EVERY NIGHT AT BEDTIME. SEPARATE BY AT LEAST 10 MINUTES FROM OTHER INTRAOCULAR PRESSURE-REDUCING OPHTHALMIC DRUGS      omega 3 1000 MG CAPS       valACYclovir (VALTREX) 500 MG tablet Take 500 mg by mouth 2 times daily.       No current facility-administered medications for this visit.     Past Medical History:   Diagnosis Date    Abnormal echocardiogram     She had CT coronary angiography which was normal    GERD (gastroesophageal reflux disease)     Screen for colon cancer 09/2021    nl     Social History     Socioeconomic History    Marital status:      Spouse name: Not on file    Number of children: Not on file    Years of education: Not on file    Highest education level: Not on file   Occupational History    Not on file   Tobacco Use    Smoking status: Never    Smokeless tobacco: Never   Vaping Use    Vaping status: Never Used   Substance and Sexual Activity    Alcohol use: No     Alcohol/week: 0.0 standard drinks of alcohol    Drug use: No    Sexual activity: Yes     Partners: Male   Other Topics Concern    Parent/sibling w/ CABG, MI or angioplasty before 65F 55M? Not Asked   Social History Narrative    ** Merged History Encounter **          Social Drivers of Health     Financial Resource Strain: Low Risk  (9/11/2024)    Financial Resource Strain     Within the past 12 months, have you or your family members you live with been unable to get utilities (heat, electricity) when it was really needed?: No   Food  Insecurity: Low Risk  (9/11/2024)    Food Insecurity     Within the past 12 months, did you worry that your food would run out before you got money to buy more?: No     Within the past 12 months, did the food you bought just not last and you didn t have money to get more?: No   Transportation Needs: Low Risk  (9/11/2024)    Transportation Needs     Within the past 12 months, has lack of transportation kept you from medical appointments, getting your medicines, non-medical meetings or appointments, work, or from getting things that you need?: No   Physical Activity: Sufficiently Active (9/11/2024)    Exercise Vital Sign     Days of Exercise per Week: 3 days     Minutes of Exercise per Session: 90 min   Stress: Stress Concern Present (9/11/2024)    [employer] [place] [employer] - Occupational Stress Questionnaire     Feeling of Stress : To some extent   Social Connections: Unknown (9/11/2024)    Social Connection and Isolation Panel [NHANES]     Frequency of Communication with Friends and Family: Not on file     Frequency of Social Gatherings with Friends and Family: Once a week     Attends [employer] Services: Not on file     Active Member of Clubs or Organizations: Not on file     Attends Club or Organization Meetings: Not on file     Marital Status: Not on file   Interpersonal Safety: Low Risk  (2/20/2025)    Interpersonal Safety     Do you feel physically and emotionally safe where you currently live?: Yes     Within the past 12 months, have you been hit, slapped, kicked or otherwise physically hurt by someone?: No     Within the past 12 months, have you been humiliated or emotionally abused in other ways by your partner or ex-partner?: No   Housing Stability: Low Risk  (9/11/2024)    Housing Stability     Do you have housing? : Yes     Are you worried about losing your housing?: No     Family History   Problem Relation Age of Onset    Hypertension Mother     Diabetes Mother     Diabetes Father   "          Review of Systems  Constitutional, HEENT, cardiovascular, pulmonary, GI, , musculoskeletal, neuro, skin, endocrine and psych systems are negative, except as otherwise noted.      Objective    Vitals - Patient Reported  Weight (Patient Reported): 56.7 kg (124 lb 14.4 oz)  Height (Patient Reported): 155.1 cm (5' 1.06\")  BMI (Based on Pt Reported Ht/Wt): 23.55        Physical Exam   General: Alert and no distress //Respiratory: No audible wheeze, cough, or shortness of breath // Psychiatric:  Appropriate affect, tone, and pace of words      Labs reviewed in Epic        telephone visit duration including chart review medication management: 33 minutes  Signed Electronically by: Roxanna Jones MD    "

## 2025-02-22 RX ORDER — ACYCLOVIR 50 MG/G
OINTMENT TOPICAL
Qty: 30 G | Refills: 2 | Status: SHIPPED | OUTPATIENT
Start: 2025-02-22

## 2025-02-22 RX ORDER — VALACYCLOVIR HYDROCHLORIDE 500 MG/1
500 TABLET, FILM COATED ORAL 2 TIMES DAILY
Qty: 20 TABLET | Refills: 3 | Status: SHIPPED | OUTPATIENT
Start: 2025-02-22

## 2025-02-24 ENCOUNTER — TELEPHONE (OUTPATIENT)
Dept: PHARMACY | Facility: OTHER | Age: 65
End: 2025-02-24
Payer: COMMERCIAL

## 2025-02-24 NOTE — TELEPHONE ENCOUNTER
MTM referral from: [place] clinic visit (referral by provider)    MTM referral outreach attempt #2 on February 24, 2025 at 8:29 AM      Outcome: Patient not reachable after several attempts, sent NxTherat message    Use [facility] medicare part D MAP for the carrier/Plan on the flowsheet      MyChart Message Sent    Carmen Fam [facility]  -[facility]  [phone]

## 2025-02-27 ENCOUNTER — OFFICE VISIT (OUTPATIENT)
Dept: FAMILY MEDICINE | Facility: CLINIC | Age: 65
End: 2025-02-27
Payer: COMMERCIAL

## 2025-02-27 VITALS
SYSTOLIC BLOOD PRESSURE: 114 MMHG | RESPIRATION RATE: 16 BRPM | HEART RATE: 77 BPM | HEIGHT: 61 IN | OXYGEN SATURATION: 100 % | TEMPERATURE: 97.7 F | DIASTOLIC BLOOD PRESSURE: 73 MMHG | BODY MASS INDEX: 24.78 KG/M2

## 2025-02-27 DIAGNOSIS — J10.1 INFLUENZA A: ICD-10-CM

## 2025-02-27 DIAGNOSIS — R73.03 PREDIABETES: ICD-10-CM

## 2025-02-27 DIAGNOSIS — Z83.42 FAMILY HISTORY OF HYPERCHOLESTEROLEMIA: ICD-10-CM

## 2025-02-27 DIAGNOSIS — E78.41 ELEVATED LIPOPROTEIN A LEVEL: ICD-10-CM

## 2025-02-27 DIAGNOSIS — E78.5 HYPERLIPIDEMIA LDL GOAL <100: Primary | ICD-10-CM

## 2025-02-27 LAB
APO A-I SERPL-MCNC: 90 MG/DL
CHOLEST SERPL-MCNC: 160 MG/DL
EST. AVERAGE GLUCOSE BLD GHB EST-MCNC: 120 MG/DL
FASTING STATUS PATIENT QL REPORTED: YES
FASTING STATUS PATIENT QL REPORTED: YES
GLUCOSE SERPL-MCNC: 100 MG/DL (ref 70–99)
HBA1C MFR BLD: 5.8 % (ref 0–5.6)
HDLC SERPL-MCNC: 45 MG/DL
LDLC SERPL CALC-MCNC: 101 MG/DL
LDLC SERPL DIRECT ASSAY-MCNC: 91 MG/DL
NONHDLC SERPL-MCNC: 115 MG/DL
TRIGL SERPL-MCNC: 69 MG/DL

## 2025-02-27 PROCEDURE — G2211 COMPLEX E/M VISIT ADD ON: HCPCS | Performed by: INTERNAL MEDICINE

## 2025-02-27 PROCEDURE — 80061 LIPID PANEL: CPT | Performed by: INTERNAL MEDICINE

## 2025-02-27 PROCEDURE — 3074F SYST BP LT 130 MM HG: CPT | Performed by: INTERNAL MEDICINE

## 2025-02-27 PROCEDURE — 1126F AMNT PAIN NOTED NONE PRSNT: CPT | Performed by: INTERNAL MEDICINE

## 2025-02-27 PROCEDURE — 99214 OFFICE O/P EST MOD 30 MIN: CPT | Performed by: INTERNAL MEDICINE

## 2025-02-27 PROCEDURE — 83036 HEMOGLOBIN GLYCOSYLATED A1C: CPT | Performed by: INTERNAL MEDICINE

## 2025-02-27 PROCEDURE — 36415 COLL VENOUS BLD VENIPUNCTURE: CPT | Performed by: INTERNAL MEDICINE

## 2025-02-27 PROCEDURE — 83721 ASSAY OF BLOOD LIPOPROTEIN: CPT | Mod: 59 | Performed by: INTERNAL MEDICINE

## 2025-02-27 PROCEDURE — 83695 ASSAY OF LIPOPROTEIN(A): CPT | Performed by: INTERNAL MEDICINE

## 2025-02-27 PROCEDURE — 82947 ASSAY GLUCOSE BLOOD QUANT: CPT | Performed by: INTERNAL MEDICINE

## 2025-02-27 PROCEDURE — 3078F DIAST BP <80 MM HG: CPT | Performed by: INTERNAL MEDICINE

## 2025-02-27 RX ORDER — ASPIRIN 81 MG/1
81 TABLET ORAL DAILY
COMMUNITY

## 2025-02-27 ASSESSMENT — PAIN SCALES - GENERAL: PAINLEVEL_OUTOF10: NO PAIN (0)

## 2025-02-27 NOTE — PROGRESS NOTES
Assessment & Plan     Influenza A  - symptoms resolving gradually  - continue current therapy    Hyperlipidemia LDL goal <100  - not well controlled  - Lipid panel reflex to direct LDL Fasting  - follow up with cardiology clinic referral  - LDL cholesterol direct  - Lipid panel reflex to direct LDL Fasting  - LDL cholesterol direct    Prediabetes  - not well controlled  - Glucose  - Hemoglobin A1c  - Glucose  - Hemoglobin A1c    Elevated Lipoprotein LPA  Family history of hypercholesterolemia  - Lipoprotein (a)  - LDL cholesterol direct  - LDL cholesterol direct        FUTURE APPOINTMENTS:       - Follow-up visit in 1 month    Juanpablo Virgen is a 64 year old, presenting for the following health issues:  RECHECK    History of Present Illness       Reason for visit:  To discuss my recent chest XRAY and labs   She is taking medications regularly.       Current Outpatient Medications   Medication Sig Dispense Refill    acyclovir (ZOVIRAX) 5 % external ointment Apply topically 5 times daily. 30 g 2    aspirin 81 MG EC tablet Take 81 mg by mouth daily.      atorvastatin (LIPITOR) 10 MG tablet Take 1 tablet (10 mg) by mouth daily. 90 tablet 3    benzonatate (TESSALON) 100 MG capsule Take 1 capsule (100 mg) by mouth 3 times daily as needed for cough. 21 capsule 1    benzonatate (TESSALON) 200 MG capsule Take 1 capsule (200 mg) by mouth 3 times daily as needed for cough. 30 capsule 0    cyanocobalamin 1000 MCG sublingual tablet Place under the tongue daily.      ketoconazole (NIZORAL) 2 % external shampoo APPLY SHAMPOO TO SCALP AND RINSE TWO TIMES A WEEK      latanoprost (XALATAN) 0.005 % ophthalmic solution INSTILL 1 DROP IN BOTH EYES EVERY NIGHT AT BEDTIME. SEPARATE BY AT LEAST 10 MINUTES FROM OTHER INTRAOCULAR PRESSURE-REDUCING OPHTHALMIC DRUGS      omega 3 1000 MG CAPS       UNABLE TO FIND MEDICATION NAME: nutrafol      UNABLE TO FIND MEDICATION NAME: fish oil      UNABLE TO FIND MEDICATION NAME: b12      UNABLE TO  FIND MEDICATION NAME: lutein and zeaxanthin      UNABLE TO FIND MEDICATION NAME: orgain organic protein      valACYclovir (VALTREX) 500 MG tablet Take 1 tablet (500 mg) by mouth 2 times daily. 20 tablet 3     No current facility-administered medications for this visit.     Past Medical History:   Diagnosis Date    Abnormal echocardiogram     She had CT coronary angiography which was normal    GERD (gastroesophageal reflux disease)     Screen for colon cancer 09/2021    nl     Social History     Socioeconomic History    Marital status:      Spouse name: Not on file    Number of children: Not on file    Years of education: Not on file    Highest education level: Not on file   Occupational History    Not on file   Tobacco Use    Smoking status: Never    Smokeless tobacco: Never   Vaping Use    Vaping status: Never Used   Substance and Sexual Activity    Alcohol use: No     Alcohol/week: 0.0 standard drinks of alcohol    Drug use: No    Sexual activity: Yes     Partners: Male   Other Topics Concern    Parent/sibling w/ CABG, MI or angioplasty before 65F 55M? Not Asked   Social History Narrative    ** Merged History Encounter **          Social Drivers of Health     Financial Resource Strain: Low Risk  (9/11/2024)    Financial Resource Strain     Within the past 12 months, have you or your family members you live with been unable to get utilities (heat, electricity) when it was really needed?: No   Food Insecurity: Low Risk  (9/11/2024)    Food Insecurity     Within the past 12 months, did you worry that your food would run out before you got money to buy more?: No     Within the past 12 months, did the food you bought just not last and you didn t have money to get more?: No   Transportation Needs: Low Risk  (9/11/2024)    Transportation Needs     Within the past 12 months, has lack of transportation kept you from medical appointments, getting your medicines, non-medical meetings or appointments, work, or from  "getting things that you need?: No   Physical Activity: Sufficiently Active (9/11/2024)    Exercise Vital Sign     Days of Exercise per Week: 3 days     Minutes of Exercise per Session: 90 min   Stress: Stress Concern Present (9/11/2024)    [employer] - Occupational Stress Questionnaire     Feeling of Stress : To some extent   Social Connections: Unknown (9/11/2024)    Social Connection and Isolation Panel [NHANES]     Frequency of Communication with Friends and Family: Not on file     Frequency of Social Gatherings with Friends and Family: Once a week     Attends [employer] Services: Not on file     Active Member of Clubs or Organizations: Not on file     Attends Club or Organization Meetings: Not on file     Marital Status: Not on file   Interpersonal Safety: Low Risk  (2/20/2025)    Interpersonal Safety     Do you feel physically and emotionally safe where you currently live?: Yes     Within the past 12 months, have you been hit, slapped, kicked or otherwise physically hurt by someone?: No     Within the past 12 months, have you been humiliated or emotionally abused in other ways by your partner or ex-partner?: No   Housing Stability: Low Risk  (9/11/2024)    Housing Stability     Do you have housing? : Yes     Are you worried about losing your housing?: No           Review of Systems  Constitutional, HEENT, cardiovascular, pulmonary, GI, , musculoskeletal, neuro, skin, endocrine and psych systems are negative, except as otherwise noted.      Objective    /73 (BP Location: Right arm, Patient Position: Sitting, Cuff Size: Adult Regular)   Pulse 77   Temp 97.7  F (36.5  C) (Temporal)   Resp 16   Ht 1.551 m (5' 1.06\")   LMP  (LMP Unknown)   SpO2 100%   BMI 24.78 kg/m    Body mass index is 24.78 kg/m .  Physical Exam   GENERAL: alert and no distress  EYES: Eyes grossly normal to inspection, conjunctivae and sclerae normal  HENT: normocephalic atraumatic  NECK: no adenopathy, " no asymmetry, masses, or scars  RESP: lungs clear to auscultation - no cough  CV: regular rate and rhythm, normal S1 S2  ABDOMEN: nondistended  MS: no gross musculoskeletal defects noted, no edema  SKIN: no suspicious lesions or rashes  NEURO: mentation intact and speech normal  PSYCH: affect normal/bright    Labs reviewed in Epic        The longitudinal plan of care for the diagnosis(es)/condition(s) as documented were addressed during this visit. Due to the added complexity in care, I will continue to support Promise in the subsequent management and with ongoing continuity of care.      Signed Electronically by: Roxanna Jones MD

## 2025-02-28 ENCOUNTER — MYC MEDICAL ADVICE (OUTPATIENT)
Dept: CARDIOLOGY | Facility: CLINIC | Age: 65
End: 2025-02-28

## 2025-02-28 PROBLEM — E78.5 HYPERLIPIDEMIA LDL GOAL <100: Status: ACTIVE | Noted: 2025-02-28

## 2025-02-28 PROBLEM — I70.0 AORTIC CALCIFICATION: Status: ACTIVE | Noted: 2025-02-28

## 2025-02-28 PROBLEM — E78.41 ELEVATED LIPOPROTEIN A LEVEL: Status: ACTIVE | Noted: 2025-02-28

## 2025-03-03 ENCOUNTER — TELEPHONE (OUTPATIENT)
Dept: CARDIOLOGY | Facility: CLINIC | Age: 65
End: 2025-03-03
Payer: COMMERCIAL

## 2025-03-03 NOTE — TELEPHONE ENCOUNTER
Please accommodate the patient as best as we can.  There may be option to add calcium score at [facility] sooner than later.

## 2025-03-03 NOTE — TELEPHONE ENCOUNTER
1st attempt- Left voicemail for the patient to call back and schedule the following:    Appointment type:  Testing and follow up  Provider:  Dr Ferguson  Return date:  Pt requesting all appts be done in March  Additional appointment(s) needed:  Pt wants labs, [facility] appt and Ct Calcium moved up to march  Additonal Notes: Pt can be offered [facility] location for CT calcium to get in sooner. Pt can be offered other KYLEE or Dr Ferguson to get in sooner if [address] and [facility] are full   Specialty phone number: [phone]

## 2025-03-04 ENCOUNTER — TELEPHONE (OUTPATIENT)
Dept: CARDIOLOGY | Facility: CLINIC | Age: 65
End: 2025-03-04
Payer: COMMERCIAL

## 2025-03-04 NOTE — TELEPHONE ENCOUNTER
1st attempt- Left voicemail for the patient to call back and schedule the following:    Appointment type:  Testing only- No clinic visit  Provider:  pt wants to r/s CT Calcium Screening  Return date:  3/5/35  Additional appointment(s) needed:  n/a  Additonal Notes:  n/a  Specialty phone number: [phone]

## 2025-03-07 ENCOUNTER — HOSPITAL ENCOUNTER (OUTPATIENT)
Dept: CARDIOLOGY | Facility: CLINIC | Age: 65
Discharge: HOME | End: 2025-03-07
Attending: INTERNAL MEDICINE | Admitting: INTERNAL MEDICINE
Payer: COMMERCIAL

## 2025-03-07 DIAGNOSIS — E78.5 HYPERLIPIDEMIA LDL GOAL <100: ICD-10-CM

## 2025-03-07 DIAGNOSIS — E78.41 ELEVATED LIPOPROTEIN A LEVEL: ICD-10-CM

## 2025-03-07 DIAGNOSIS — I70.0 AORTIC CALCIFICATION: ICD-10-CM

## 2025-03-07 PROCEDURE — 75571 CT HRT W/O DYE W/CA TEST: CPT

## 2025-03-20 ENCOUNTER — NURSE TRIAGE (OUTPATIENT)
Dept: NURSING | Facility: CLINIC | Age: 65
End: 2025-03-20
Payer: COMMERCIAL

## 2025-03-21 NOTE — TELEPHONE ENCOUNTER
Nurse Triage SBAR    Is this a 2nd Level Triage? NO    Situation: Pt felt dizzy this am and is feeling generalized weakness over the past few days     Background: Pt felt dizzy this am and is feeling generalized weakness over the past few days     Assessment: Pt felt dizzy this am and is feeling generalized weakness over the past few days     Dizziness happened this morning 03/20/2025 - Pt states that it was brief and is not happening now     Pt is currently not dizzy     Pt states that she is seeing a Cardiologist and is on a new restrictive diet for high cholesterol     Pt blood pressure is 117/59, 110/62, 103/59 - this was throughout today     No fever     Denies breathing difficulty     Denies blue color on lips or face     Denies any extra heart beats or feeling like her heart is beating fast     Denies weakness or numbness on one side of her body     Denies vision changes     Protocol Recommended Disposition: See PCP Within 3 Days   No disposition on file.    Recommendation: See PCP Within 3 Days - pt states that she will be calling her Cardiology office in the morning and if they are unable to see her pt will go to Urgent Care for evaluation     Care advice given per protocol and when to call back. Pt verbalized understanding and agrees to plan of care.    Mely Wilcox RN  [facility] Nurse Advisor  10:07 PM 3/20/2025       Reason for Disposition   [1] MILD dizziness (e.g., walking normally) AND [2] has NOT been evaluated by doctor (or NP/PA) for this  (Exception: Dizziness caused by heat exposure, sudden standing, or poor fluid intake.)    Additional Information   Negative: SEVERE difficulty breathing (e.g., struggling for each breath, speaks in single words)   Negative: [1] Difficulty breathing or swallowing AND [2] started suddenly after medicine, an allergic food or bee sting   Negative: Shock suspected (e.g., cold/pale/clammy skin, too weak to stand, low BP, rapid pulse)   Negative: Difficult to awaken  "or acting confused (e.g., disoriented, slurred speech)   Negative: [1] Weakness (i.e., paralysis, loss of muscle strength) of the face, arm or leg on one side of the body AND [2] sudden onset AND [3] present now   Negative: [1] Numbness (i.e., loss of sensation) of the face, arm or leg on one side of the body AND [2] sudden onset AND [3] present now   Negative: [1] Loss of speech or garbled speech AND [2] sudden onset AND [3] present now   Negative: Overdose (accidental or intentional) of medications   Negative: [1] Fainted > 15 minutes ago AND [2] still feels too weak or dizzy to stand   Negative: Heart beating < 50 beats per minute OR > 140 beats per minute   Negative: Sounds like a life-threatening emergency to the triager   Negative: Chest pain   Negative: Rectal bleeding, bloody stool, or tarry-black stool   Negative: [1] Vomiting AND [2] contains red blood or black (\"coffee ground\") material   Negative: Vomiting is main symptom   Negative: Diarrhea is main symptom   Negative: Headache is main symptom   Negative: Patient states that they are having an anxiety or panic attack   Negative: Dizziness from low blood sugar (i.e., < 60 mg/dl or 3.5 mmol/l)   Negative: Dizziness is described as a spinning sensation (i.e., vertigo)   Negative: Heat exhaustion suspected (i.e., dehydration from heat exposure)   Negative: Difficulty breathing   Negative: SEVERE dizziness (e.g., unable to stand, requires support to walk, feels like passing out now)   Negative: Extra heartbeats, irregular heart beating, or heart is beating very fast  (i.e., \"palpitations\")   Negative: [1] Drinking very little AND [2] dehydration suspected (e.g., no urine > 12 hours, very dry mouth, very lightheaded)   Negative: [1] Weakness (i.e., paralysis, loss of muscle strength) of the face, arm / hand, or leg / foot on one side of the body AND [2] sudden onset AND [3] brief (now gone)   Negative: [1] Numbness (i.e., loss of sensation) of the face, arm / " hand, or leg / foot on one side of the body AND [2] sudden onset AND [3] brief (now gone)   Negative: [1] Loss of speech or garbled speech AND [2] sudden onset AND [3] brief (now gone)   Negative: Loss of vision or double vision  (Exception: Similar to previous migraines.)   Negative: Patient sounds very sick or weak to the triager   Negative: [1] Dizziness caused by heat exposure, sudden standing, or poor fluid intake AND [2] no improvement after 2 hours of rest and fluids   Negative: [1] Fever > 103 F (39.4 C) AND [2] not able to get the fever down using Fever Care Advice   Negative: [1] Fever > 101 F (38.3 C) AND [2] age > 60 years   Negative: [1] Fever > 100.0 F (37.8 C) AND [2] bedridden (e.g., CVA, chronic illness, recovering from surgery)   Negative: [1] Fever > 100.0 F (37.8 C) AND [2] diabetes mellitus or weak immune system (e.g., HIV positive, cancer chemo, splenectomy, organ transplant, chronic steroids)   Negative: [1] MODERATE dizziness (e.g., interferes with normal activities) AND [2] has NOT been evaluated by doctor (or NP/PA) for this  (Exception: Dizziness caused by heat exposure, sudden standing, or poor fluid intake.)   Negative: Fever present > 3 days (72 hours)   Negative: Taking a medicine that could cause dizziness (e.g., blood pressure medications, diuretics)   Negative: [1] MODERATE dizziness (e.g., interferes with normal activities) AND [2] has been evaluated by doctor (or NP/PA) for this    Protocols used: Dizziness - [id number]

## 2025-03-24 ENCOUNTER — HOSPITAL ENCOUNTER (OUTPATIENT)
Dept: CARDIOLOGY | Facility: CLINIC | Age: 65
Discharge: HOME | End: 2025-03-24
Attending: INTERNAL MEDICINE | Admitting: INTERNAL MEDICINE
Payer: COMMERCIAL

## 2025-03-24 ENCOUNTER — PATIENT OUTREACH (OUTPATIENT)
Dept: CARE COORDINATION | Facility: CLINIC | Age: 65
End: 2025-03-24

## 2025-03-24 ENCOUNTER — LAB (OUTPATIENT)
Dept: LAB | Facility: CLINIC | Age: 65
End: 2025-03-24
Payer: COMMERCIAL

## 2025-03-24 DIAGNOSIS — E78.5 HYPERLIPIDEMIA LDL GOAL <100: ICD-10-CM

## 2025-03-24 DIAGNOSIS — I70.0 AORTIC CALCIFICATION: ICD-10-CM

## 2025-03-24 LAB
CHOLEST SERPL-MCNC: 138 MG/DL
FASTING STATUS PATIENT QL REPORTED: YES
HDLC SERPL-MCNC: 40 MG/DL
LDLC SERPL CALC-MCNC: 79 MG/DL
LVEF ECHO: NORMAL
NONHDLC SERPL-MCNC: 98 MG/DL
TRIGL SERPL-MCNC: 94 MG/DL

## 2025-03-24 PROCEDURE — 80061 LIPID PANEL: CPT | Performed by: INTERNAL MEDICINE

## 2025-03-24 PROCEDURE — 36415 COLL VENOUS BLD VENIPUNCTURE: CPT | Performed by: INTERNAL MEDICINE

## 2025-03-24 PROCEDURE — 93306 TTE W/DOPPLER COMPLETE: CPT | Mod: 26 | Performed by: INTERNAL MEDICINE

## 2025-03-24 PROCEDURE — 999N000208 ECHOCARDIOGRAM COMPLETE

## 2025-03-24 PROCEDURE — 255N000002 HC RX 255 OP 636: Performed by: INTERNAL MEDICINE

## 2025-03-24 RX ADMIN — PERFLUTREN 10 ML: 6.52 INJECTION, SUSPENSION INTRAVENOUS at 08:25

## 2025-03-25 NOTE — PROGRESS NOTES
Cardiology Clinic Progress Note  Promise Nunez MRN# [id number]   YOB: 1960 Age: 64 year old         Primary Cardiologist: Dr. Ferguson       Assessment:     1.  Aortic calcification - incidentally noted on CXR during hospitalization in Feb 2025 for influenza A.   - 3/2025 CT coronary calcium score 0, OK to stop ASA.   - Echocardiogram 3/3035 with normal LVEF 60-65%, normal RV and mild mitral regurgitation    2. HLD with elevated lipoprotein A- started on atorvastatin 10 mg daily in Feb 2025  - 9/2024 , HLD 65,   - 2/2025 , HLD 45,  - started on statin  - 3/2025 , HLD 40, LDL 79  - LDL goal <60, on atorvastatin 10 mg daily. She is having some complaints of fatigue and throat soreness that I do no believe are medication related however we decided that she may take a 1 week trial off the statin to see if symptoms improve. If they do not, she should resume statin therapy.   - will arrange dietician consultation as well   - FLP will be rechecked in 6 months at which time we can consider increasing statin dose if needed    3. Pre DM - HbA1c of 5.8%    4. Mild MR, Trace AR, trace CO, trace TR - can repeat echo in 2-3 years         Plan:   - OK to stop Aspirin  - RN team will work on sending a consult to dietician to discuss low cholesterol diet   - you can take a 1 week trial off the statin to see if your symptoms improve, if they do not, please resume taking the statin  - follow up in 6 months with fasting blood test - if LDL not <60, will increase statin dose at that time      Amina Alexis PA-C  [facility]        HPI:   Promise Nunez is a very pleasant 64 year old female with PMH significant for aortic calcification noted on CXR, HLD with elevated Lipoprotein A, preDM presenting to cardiology clinic for follow up.     She was seen in cardiac consultation by Dr. Ferguson on 2/28/25 for evaluation of aortic calcification and hyperlipidemia. She was continued  on atorvastatin 10 mg daily and ASA 81 mg daily. CT coronary calcium scan demonstrated calcium score of 0. Echocardiogram demonstrated LVEF 60-65% with normal RV function, mild (1+) mitral regurgitation and mild aortic sclerosis (without stenosis). She was encouraged to follow and heart healthy diet and participate in regular exercise.     Today, patient is here with her . She is very concerned about her recent test results. She has noted some fatigue and lethargy since losing about 13 lbs over the past couple months. She is following a very strict diet. No muscle pain or weakness. She also notes an intermittent sore throat. She is concerned that the statin may be causing her fatigue and sore throat. She would like to meet with a dietician.         ROS:   Please see pertinent positives in HPI      Medications:   Current Outpatient Medications   Medication Sig Dispense Refill    acyclovir (ZOVIRAX) 5 % external ointment Apply topically 5 times daily. 30 g 2    aspirin 81 MG EC tablet Take 81 mg by mouth daily.      atorvastatin (LIPITOR) 10 MG tablet Take 1 tablet (10 mg) by mouth daily. 90 tablet 3    benzonatate (TESSALON) 100 MG capsule Take 1 capsule (100 mg) by mouth 3 times daily as needed for cough. 21 capsule 1    benzonatate (TESSALON) 200 MG capsule Take 1 capsule (200 mg) by mouth 3 times daily as needed for cough. 30 capsule 0    cyanocobalamin 1000 MCG sublingual tablet Place under the tongue daily.      ketoconazole (NIZORAL) 2 % external shampoo APPLY SHAMPOO TO SCALP AND RINSE TWO TIMES A WEEK      latanoprost (XALATAN) 0.005 % ophthalmic solution INSTILL 1 DROP IN BOTH EYES EVERY NIGHT AT BEDTIME. SEPARATE BY AT LEAST 10 MINUTES FROM OTHER INTRAOCULAR PRESSURE-REDUCING OPHTHALMIC DRUGS      omega 3 1000 MG CAPS       UNABLE TO FIND MEDICATION NAME: nutrafol      UNABLE TO FIND MEDICATION NAME: fish oil      UNABLE TO FIND MEDICATION NAME: b12      UNABLE TO FIND MEDICATION NAME: lutein and  "zeaxanthin      UNABLE TO FIND MEDICATION NAME: orgain organic protein      valACYclovir (VALTREX) 500 MG tablet Take 1 tablet (500 mg) by mouth 2 times daily. 20 tablet 3         Physical Exam:     Vitals: /72   Pulse 94   Ht 1.549 m (5' 1\")   Wt 52.2 kg (115 lb)   LMP  (LMP Unknown)   BMI 21.73 kg/m       Wt Readings from Last 4 Encounters:   03/21/25 53.2 kg (117 lb 3.2 oz)   02/28/25 55.7 kg (122 lb 14.4 oz)   02/17/25 59.6 kg (131 lb 6.4 oz)   02/15/25 60.8 kg (134 lb)       GEN: NAD  C/V:  Regular rate and rhythm, no murmur.   RESP: Respirations are unlabored. Clear to auscultation bilaterally without wheezing, rales, or rhonchi.  EXT: No LE edema.      Data:     Last lab work personally reviewed     Echo: 3/2025  Left ventricular systolic function is normal.The visual ejection fraction is  60-65%.  The right ventricular systolic function is normal.  There is mild (1+) mitral regurgitation.There is mild aortic sclerosis.  IVC diameter <2.1 cm collapsing >50% with sniff suggests a normal RA pressure  of 3 mmHg.    CT coronary calcium scan 3/2025  IMPRESSIONS:  1.  No coronary calcifications.  2.  The total Agatston calcium score is 0 placing the patient in the  lowest percentile when compared to age and gender matched control  group.  3.  Recommend aggressive risk factor modification.  4.  Please review Radiology report for incidental noncardiac findings  that will follow separately.     Stress Echo 2013  1. Normal LV size and systolic function    2. RV not optimally visualized   Stress Findings:   1. No EKG evidence of ischemia    2.  Normal hyperdynamic response to stress with no inducible wall motion abnormalities and stress EF 70%. Compared to prior stress dated 2/6/2013, there is better visualization of inferior wall and it is moving normally with stress.     "

## 2025-03-26 ENCOUNTER — OFFICE VISIT (OUTPATIENT)
Dept: CARDIOLOGY | Facility: CLINIC | Age: 65
End: 2025-03-26
Payer: COMMERCIAL

## 2025-03-26 VITALS
HEART RATE: 94 BPM | BODY MASS INDEX: 21.71 KG/M2 | HEIGHT: 61 IN | SYSTOLIC BLOOD PRESSURE: 112 MMHG | WEIGHT: 115 LBS | DIASTOLIC BLOOD PRESSURE: 72 MMHG

## 2025-03-26 DIAGNOSIS — I70.0 AORTIC CALCIFICATION: ICD-10-CM

## 2025-03-26 DIAGNOSIS — E78.5 HYPERLIPIDEMIA LDL GOAL <100: Primary | ICD-10-CM

## 2025-03-26 DIAGNOSIS — R73.03 PREDIABETES: ICD-10-CM

## 2025-03-26 DIAGNOSIS — E78.5 HYPERLIPIDEMIA LDL GOAL <100: ICD-10-CM

## 2025-03-26 NOTE — LETTER
3/26/2025    Roxanna Jones MD  [address]  [place] [address]    RE: Promise Nunez       Dear Colleague,     I had the pleasure of seeing Promise Nunez in the [facility].  Cardiology Clinic Progress Note  Promise Nunez MRN# [id number]   YOB: 1960 Age: 64 year old         Primary Cardiologist: Dr. Ferguson       Assessment:     1.  Aortic calcification - incidentally noted on CXR during hospitalization in Feb 2025 for influenza A.   - 3/2025 CT coronary calcium score 0, OK to stop ASA.   - Echocardiogram 3/3035 with normal LVEF 60-65%, normal RV and mild mitral regurgitation    2. HLD with elevated lipoprotein A- started on atorvastatin 10 mg daily in Feb 2025  - 9/2024 , HLD 65,   - 2/2025 , HLD 45,  - started on statin  - 3/2025 , HLD 40, LDL 79  - LDL goal <60, on atorvastatin 10 mg daily. She is having some complaints of fatigue and throat soreness that I do no believe are medication related however we decided that she may take a 1 week trial off the statin to see if symptoms improve. If they do not, she should resume statin therapy.   - will arrange dietician consultation as well   - FLP will be rechecked in 6 months at which time we can consider increasing statin dose if needed    3. Pre DM - HbA1c of 5.8%    4. Mild MR, Trace AR, trace WV, trace TR - can repeat echo in 2-3 years         Plan:   - OK to stop Aspirin  - RN team will work on sending a consult to dietician to discuss low cholesterol diet   - you can take a 1 week trial off the statin to see if your symptoms improve, if they do not, please resume taking the statin  - follow up in 6 months with fasting blood test - if LDL not <60, will increase statin dose at that time      [facility]        HPI:   Promise Nunez is a very pleasant 64 year old female with PMH significant for aortic calcification noted on CXR, HLD with elevated  Lipoprotein A, preDM presenting to cardiology clinic for follow up.     She was seen in cardiac consultation by Dr. Ferguson on 2/28/25 for evaluation of aortic calcification and hyperlipidemia. She was continued on atorvastatin 10 mg daily and ASA 81 mg daily. CT coronary calcium scan demonstrated calcium score of 0. Echocardiogram demonstrated LVEF 60-65% with normal RV function, mild (1+) mitral regurgitation and mild aortic sclerosis (without stenosis). She was encouraged to follow and heart healthy diet and participate in regular exercise.     Today, patient is here with her . She is very concerned about her recent test results. She has noted some fatigue and lethargy since losing about 13 lbs over the past couple months. She is following a very strict diet. No muscle pain or weakness. She also notes an intermittent sore throat. She is concerned that the statin may be causing her fatigue and sore throat. She would like to meet with a dietician.         ROS:   Please see pertinent positives in HPI      Medications:   Current Outpatient Medications   Medication Sig Dispense Refill     acyclovir (ZOVIRAX) 5 % external ointment Apply topically 5 times daily. 30 g 2     aspirin 81 MG EC tablet Take 81 mg by mouth daily.       atorvastatin (LIPITOR) 10 MG tablet Take 1 tablet (10 mg) by mouth daily. 90 tablet 3     benzonatate (TESSALON) 100 MG capsule Take 1 capsule (100 mg) by mouth 3 times daily as needed for cough. 21 capsule 1     benzonatate (TESSALON) 200 MG capsule Take 1 capsule (200 mg) by mouth 3 times daily as needed for cough. 30 capsule 0     cyanocobalamin 1000 MCG sublingual tablet Place under the tongue daily.       ketoconazole (NIZORAL) 2 % external shampoo APPLY SHAMPOO TO SCALP AND RINSE TWO TIMES A WEEK       latanoprost (XALATAN) 0.005 % ophthalmic solution INSTILL 1 DROP IN BOTH EYES EVERY NIGHT AT BEDTIME. SEPARATE BY AT LEAST 10 MINUTES FROM OTHER INTRAOCULAR PRESSURE-REDUCING OPHTHALMIC  "DRUGS       omega 3 1000 MG CAPS        UNABLE TO FIND MEDICATION NAME: nutrafol       UNABLE TO FIND MEDICATION NAME: fish oil       UNABLE TO FIND MEDICATION NAME: b12       UNABLE TO FIND MEDICATION NAME: lutein and zeaxanthin       UNABLE TO FIND MEDICATION NAME: orgain organic protein       valACYclovir (VALTREX) 500 MG tablet Take 1 tablet (500 mg) by mouth 2 times daily. 20 tablet 3         Physical Exam:     Vitals: /72   Pulse 94   Ht 1.549 m (5' 1\")   Wt 52.2 kg (115 lb)   LMP  (LMP Unknown)   BMI 21.73 kg/m       Wt Readings from Last 4 Encounters:   03/21/25 53.2 kg (117 lb 3.2 oz)   02/28/25 55.7 kg (122 lb 14.4 oz)   02/17/25 59.6 kg (131 lb 6.4 oz)   02/15/25 60.8 kg (134 lb)       GEN: NAD  C/V:  Regular rate and rhythm, no murmur.   RESP: Respirations are unlabored. Clear to auscultation bilaterally without wheezing, rales, or rhonchi.  EXT: No LE edema.      Data:     Last lab work personally reviewed     Echo: 3/2025  Left ventricular systolic function is normal.The visual ejection fraction is  60-65%.  The right ventricular systolic function is normal.  There is mild (1+) mitral regurgitation.There is mild aortic sclerosis.  IVC diameter <2.1 cm collapsing >50% with sniff suggests a normal RA pressure  of 3 mmHg.    CT coronary calcium scan 3/2025  IMPRESSIONS:  1.  No coronary calcifications.  2.  The total Agatston calcium score is 0 placing the patient in the  lowest percentile when compared to age and gender matched control  group.  3.  Recommend aggressive risk factor modification.  4.  Please review Radiology report for incidental noncardiac findings  that will follow separately.     Stress Echo 2013  1. Normal LV size and systolic function    2. RV not optimally visualized   Stress Findings:   1. No EKG evidence of ischemia    2.  Normal hyperdynamic response to stress with no inducible wall motion abnormalities and stress EF 70%. Compared to prior stress dated 2/6/2013, there " is better visualization of inferior wall and it is moving normally with stress.       Thank you for allowing me to participate in the care of your patient.      Sincerely,     Amina Alexis PA-C     [facility]  cc:   Keith Ferguson MD  [address]  [address]  [place] [address]

## 2025-03-26 NOTE — PATIENT INSTRUCTIONS
Thank you for your visit with the [facility] today.    Today's plan:   - OK to stop Aspirin  - RN team will work on sending a consult to dietician to discuss low cholesterol diet   - you can take a 1 week trial off the statin to see if your symptoms improve, if they do not, please resume taking the statin  - follow up in 6 months with fasting blood test prior       If you have questions or concerns please call the nurse team at [phone] or send a [employer] message.     Scheduling phone number: [phone]    It was a pleasure seeing you today!     Amina Alexis PA-C   [facility]

## 2025-03-31 ENCOUNTER — MYC MEDICAL ADVICE (OUTPATIENT)
Dept: FAMILY MEDICINE | Facility: CLINIC | Age: 65
End: 2025-03-31

## 2025-03-31 ENCOUNTER — OFFICE VISIT (OUTPATIENT)
Dept: FAMILY MEDICINE | Facility: CLINIC | Age: 65
End: 2025-03-31
Payer: COMMERCIAL

## 2025-03-31 ENCOUNTER — OFFICE VISIT (OUTPATIENT)
Dept: PHARMACY | Facility: CLINIC | Age: 65
End: 2025-03-31
Payer: COMMERCIAL

## 2025-03-31 ENCOUNTER — PATIENT OUTREACH (OUTPATIENT)
Dept: ONCOLOGY | Facility: CLINIC | Age: 65
End: 2025-03-31

## 2025-03-31 VITALS — SYSTOLIC BLOOD PRESSURE: 104 MMHG | DIASTOLIC BLOOD PRESSURE: 70 MMHG

## 2025-03-31 VITALS
HEART RATE: 88 BPM | SYSTOLIC BLOOD PRESSURE: 115 MMHG | DIASTOLIC BLOOD PRESSURE: 72 MMHG | HEIGHT: 61 IN | RESPIRATION RATE: 16 BRPM | BODY MASS INDEX: 21.71 KG/M2 | OXYGEN SATURATION: 98 % | TEMPERATURE: 96.9 F | WEIGHT: 115 LBS

## 2025-03-31 DIAGNOSIS — E78.5 HYPERLIPIDEMIA LDL GOAL <100: ICD-10-CM

## 2025-03-31 DIAGNOSIS — E78.5 HYPERLIPIDEMIA LDL GOAL <100: Primary | ICD-10-CM

## 2025-03-31 DIAGNOSIS — R91.8 PULMONARY NODULES: Primary | ICD-10-CM

## 2025-03-31 DIAGNOSIS — Z23 NEED FOR VACCINATION: ICD-10-CM

## 2025-03-31 DIAGNOSIS — I70.0 AORTIC CALCIFICATION: ICD-10-CM

## 2025-03-31 DIAGNOSIS — Z78.9 TAKES DIETARY SUPPLEMENTS: ICD-10-CM

## 2025-03-31 DIAGNOSIS — E78.41 ELEVATED LIPOPROTEIN A LEVEL: ICD-10-CM

## 2025-03-31 DIAGNOSIS — E78.41 ELEVATED LIPOPROTEIN A LEVEL: Primary | ICD-10-CM

## 2025-03-31 DIAGNOSIS — B00.9 HSV (HERPES SIMPLEX VIRUS) INFECTION: ICD-10-CM

## 2025-03-31 DIAGNOSIS — H40.003 GLAUCOMA SUSPECT, BILATERAL: ICD-10-CM

## 2025-03-31 DIAGNOSIS — L21.0 DANDRUFF: ICD-10-CM

## 2025-03-31 DIAGNOSIS — R73.03 PREDIABETES: ICD-10-CM

## 2025-03-31 DIAGNOSIS — R91.8 PULMONARY NODULES: ICD-10-CM

## 2025-03-31 PROCEDURE — 3078F DIAST BP <80 MM HG: CPT | Performed by: INTERNAL MEDICINE

## 2025-03-31 PROCEDURE — 99214 OFFICE O/P EST MOD 30 MIN: CPT | Performed by: INTERNAL MEDICINE

## 2025-03-31 PROCEDURE — 99607 MTMS BY PHARM ADDL 15 MIN: CPT

## 2025-03-31 PROCEDURE — 3078F DIAST BP <80 MM HG: CPT

## 2025-03-31 PROCEDURE — 1126F AMNT PAIN NOTED NONE PRSNT: CPT | Performed by: INTERNAL MEDICINE

## 2025-03-31 PROCEDURE — 99605 MTMS BY PHARM NP 15 MIN: CPT

## 2025-03-31 PROCEDURE — G2211 COMPLEX E/M VISIT ADD ON: HCPCS | Performed by: INTERNAL MEDICINE

## 2025-03-31 PROCEDURE — 3074F SYST BP LT 130 MM HG: CPT

## 2025-03-31 PROCEDURE — 3074F SYST BP LT 130 MM HG: CPT | Performed by: INTERNAL MEDICINE

## 2025-03-31 RX ORDER — ATORVASTATIN CALCIUM 20 MG/1
20 TABLET, FILM COATED ORAL DAILY
Qty: 90 TABLET | Refills: 1 | Status: SHIPPED | OUTPATIENT
Start: 2025-03-31

## 2025-03-31 ASSESSMENT — PAIN SCALES - GENERAL: PAINLEVEL_OUTOF10: NO PAIN (0)

## 2025-03-31 NOTE — Clinical Note
Assessment & Plan     Encounter for Nexplanon removal    Encounter for initial prescription of contraceptive pills  - norethindrone-ethinyl estradiol (JUNEL FE 1/20) 1-20 MG-MCG tablet; Take 1 tablet by mouth daily.      I spent a total of 30 minutes on the day of the visit.   Time spent by me today doing chart review, history and exam, documentation and further activities per the note        See Patient Instructions  Patient Instructions   ASSESSMENT & PLAN    Nexplanon Removal:  - Nexplanon removal procedure performed.  - Apply steri strip and band-aid over the incision. Leave steri strip to fall off on its own. Keep band-aid and gauze on for 24 hours.  - Risks and side effects: Potential swelling and discomfort. Advised to take ibuprofen and Tylenol for the next couple of days.    Birth Control Transition:  - Transition from Nexplanon to birth control pills.  - Prescribe birth control pills with iron to help with heavy periods. Take once daily at the same time each day. Set an alarm if needed to remember. Prescription sent to the pharmacy for pickup.    José Lowe is a 18 year old, presenting for the following health issues:  IUD (Nexplanon removal)      2/3/2025     1:59 PM   Additional Questions   Roomed by Socorro   Accompanied by boyfrienmaxime aleman         2/3/2025     1:59 PM   Patient Reported Additional Medications   Patient reports taking the following new medications none     IUD    History of Present Illness       Reason for visit:  Birth control removal   She is taking medications regularly.     Nexplanon removal     SUBJECTIVE    The patient is a 18-year-old female who came in for the removal of her Nexplanon implant. She expressed interest in switching to birth control pills and mentioned that she has not used them before. She reported no history of migraines or liver problems and initially thought she had a history of blood clots but then corrected herself, stating she has not had any. She  FYI experiences occasional heavy periods and is considering a birth control pill that includes iron to help manage this symptom.    She works at [employer] and resides in [place]. During the procedure, she felt nervous but was reassured throughout. After the removal, she felt a little dizzy and was advised to rest for a few minutes. She was informed that she could  her birth control prescription from the pharmacy either today or early tomorrow.  OBJECTIVE    Physical exam:    - Nexplanon removal procedure performed    - Lidocaine used for local anesthesia    - Steri strip and band-aid applied post-procedure    - Compression wrap applied to arm        PROCEDURE NOTE/NEXPLANON REMOVAL:   Chrissy Pedro is a  -year-old female who presents for removal of her Nexplanon single progestin only subdermal contraceptive implant.  She has been moderately dissatisfied with this method of contraception due to bleeding.    The Nexplanon frida under the skin of the left upper arm is easily able to palpate.  Additional imaging for location was not needed.  She was positioned so the site of her Nexplanon was visible and fully accessible.  After verbal informed consent, the patient was prepped and draped in the usual fashion.  Operative site was cleansed with Betadine.  Working distally, a wheal of 1% Xylocaine with epinephrine was infiltrated underneath the end of the implant closest to the elbow.  Downward pressure was applied on the end of the implant nearest axilla and a 1-2 mm incision was made in the longitudinal direction of the arm at the tip of the implant closest the elbow.  The implant was then gently pushed towards the incision until the tip was visible.  The fibrous capsule was opened with a combination of blunt and sharp dissection.  The implant was grasped with a curved Jane and removed intact.  It measured a full 4 cm in length.  The wound was quite small and did not need closure.  A Steri-Strip and a Band-Aid was  "applied.  Estimated blood loss was minimal.  There were no complications or problems.  She demonstrated full active range of motion of her elbow, wrist and all 5 digits and tonight numbness and tingling.                     Objective    /79 (BP Location: Right arm, Patient Position: Sitting, Cuff Size: Adult Regular)   Pulse 111   Temp 100  F (37.8  C) (Temporal)   Resp 17   Ht 1.729 m (5' 8.07\")   Wt 51.8 kg (114 lb 3.2 oz)   LMP 01/28/2025 (Approximate)   SpO2 100%   BMI 17.33 kg/m    Body mass index is 17.33 kg/m .  Physical Exam   GENERAL: alert and no distress  EYES: Eyes grossly normal to inspection, PERRL and conjunctivae and sclerae normal  MS: no gross musculoskeletal defects noted, no edema  SKIN: no suspicious lesions or rashes  NEURO: Normal strength and tone, mentation intact and speech normal  PSYCH: mentation appears normal, affect normal/bright    Admission on 11/19/2023, Discharged on 11/19/2023   Component Date Value Ref Range Status    Group A Strep antigen 11/19/2023 Negative  Negative Final    Group A strep by PCR 11/19/2023 Not Detected  Not Detected Final    Influenza A PCR 11/19/2023 Negative  Negative Final    Influenza B PCR 11/19/2023 Negative  Negative Final    RSV PCR 11/19/2023 Negative  Negative Final    SARS CoV2 PCR 11/19/2023 Negative  Negative Final    NEGATIVE: SARS-CoV-2 (COVID-19) RNA not detected, presumed negative.     No results found for any visits on 02/03/25.  No results found.        Signed Electronically by: Jaimee Herrera PA-C    "

## 2025-03-31 NOTE — PROGRESS NOTES
New IP (Interventional Pulmonology) referral rec'd.  Chart reviewed.       New Patient: Interventional Pulmonary (Lung nodule) Nurse Navigator Note    Referring provider: [facility]    Referred to (specialty): Interventional Pulmonary (Lung nodule)    Requested provider (if applicable): n/a    Date Referral Received: 3/31/2025    Evaluation for :  Lung nodule    Clinical History (per Nurse review of records provided):    **BOOK MARKED**    OVERREAD: DETAILED [facility] EXTRACARDIAC OVERREAD OF CARDIAC CT   LOCATION: [facility]  DATE: 3/7/2025     INDICATION:  Aortic calcification, Hyperlipidemia LDL goal <100, Elevated lipoprotein A level  TECHNIQUE: Dose reduction techniques were used.  COMPARISON: None.     FINDINGS:    LIMITED CHEST: 5 mm pulmonary nodule in the left lower lobe adjacent to the left major fissure (5/7).  LIMITED MEDIASTINUM: Negative.  LIMITED UPPER ABDOMEN: Negative.                                                                      IMPRESSION:    1.  5 mm pulmonary nodule in the left lower lobe. See follow-up recommendations below  2.  Please refer to cardiologist's dictation for the cardiac CT report.     Records Location: [facility]     Records Needed: none    Additional testing needed prior to consult: CT Chest

## 2025-03-31 NOTE — LETTER
"Recommended To-Do List      Prepared on: Mar 31, 2025       You can get the best results from your medications by completing the items on this \"To-Do List.\"      Bring your To-Do List when you go to your doctor. And, share it with your family or caregivers.    My To-Do List:  What we talked about: What I should do:   A new medication that may be of benefit to you    Get the following vaccine(s): COVID-19 booster, RSV, influenza, tetanus booster, Shingles, Hepatitis A, Hepatitis B           What we talked about: What I should do:                     "

## 2025-03-31 NOTE — LETTER
March 31, 2025  Promise Nunez  [address]   [place] [address]    Dear SHAYNE Andrew [facility] YAYO     Thank you for talking with me on Mar 31, 2025 about your health and medications. As a follow-up to our conversation, I have included two documents:      Your Recommended To-Do List has steps you should take to get the best results from your medications.  Your Medication List will help you keep track of your medications and how to take them.    If you want to talk about these documents, please call Mariah Jewell RPH at phone: [phone], Monday-Friday 8-4:30pm.    I look forward to working with you and your doctors to make sure your medications work well for you.    Sincerely,  Mariah Jewell RPH  [facility] Pharmacist, [facility]

## 2025-03-31 NOTE — LETTER
_  Medication List        Prepared on: Mar 31, 2025     Bring your Medication List when you go to the doctor, hospital, or   emergency room. And, share it with your family or caregivers.     Note any changes to how you take your medications.  Cross out medications when you no longer use them.    Medication How I take it Why I use it Prescriber   acyclovir (ZOVIRAX) 5 % external ointment Apply topically 5 times daily. HSV Infection Roxanna Jones MD   atorvastatin (LIPITOR) 10 MG tablet Take 1 tablet (10 mg) by mouth daily. Hyperlipidemia LDL Goal <100 Roxanna Jones MD   ketoconazole (NIZORAL) 2 % external shampoo APPLY SHAMPOO TO SCALP AND RINSE TWO TIMES A WEEK  Dandruff Patient Reported   latanoprost (XALATAN) 0.005 % ophthalmic solution INSTILL 1 DROP IN BOTH EYES EVERY NIGHT AT BEDTIME. SEPARATE BY AT LEAST 10 MINUTES FROM OTHER INTRAOCULAR PRESSURE-REDUCING OPHTHALMIC DRUGS  Glaucoma Patient Reported   UNABLE TO FIND MEDICATION NAME: nutrafol  General Health Patient Reported   UNABLE TO FIND MEDICATION NAME: lutein and zeaxanthin  General Health Patient Reported   UNABLE TO FIND MEDICATION NAME: orgain organic protein  General Health Patient Reported   valACYclovir (VALTREX) 500 MG tablet Take 1 tablet (500 mg) by mouth 2 times daily. HSV Infection Roxanna Jones MD         Add new medications, over-the-counter drugs, herbals, vitamins, or  minerals in the blank rows below.    Medication How I take it Why I use it Prescriber                                      Allergies:      No Known Allergies        Side effects I have had:      No Known Side Effects        Other Information:              My notes and questions:

## 2025-03-31 NOTE — PROGRESS NOTES
Medication Therapy Management (MTM) Encounter    ASSESSMENT:                            Medication Adherence/Access: No issues identified.    Hyperlipidemia/Elevated Lipoprotein A  LDL not at cardiology's goal of <60 mg/dL. Could consider increasing atorvastatin dose, however, due to patient preference will follow cardiology's plan to recheck lipid panel in 6 months and increase dose if needed. Reported symptoms seem to be unrelated to atorvastatin, so would defer to PCP or cardiology for further evaluation.  Patient's dietary changes may also help reduce LDL further - would recommend making an appointment with a registered dietician with cardiology's referral to review diet recommendations further.     Supplements   Stable, supplements unlikely to interact with medications or worsen conditions.      HSV   Stable.     Dandruff  Stable.     Glaucoma  Stable.     Immunizations  Due for multiple vaccines.     PLAN:                            Continue your current medications  Call [phone] for your diabetes education referral  Vaccines that you are due for:  COVID-19 booster, RSV, influenza  Tetanus booster, Shingles  Hepatitis A, Hepatitis B    Follow-up: 9/30 at 10:30 AM    SUBJECTIVE/OBJECTIVE:                          Promise Nunez is a 64 year old female coming in for an initial visit. She was referred to me from Dr. Jones. Patient was accompanied by her  Ankur for our visit today.    Reason for visit: Comprehensive medication review.    Allergies/ADRs: Reviewed in chart  Past Medical History: Reviewed in chart  Tobacco: She reports that she has never smoked. She has never used smokeless tobacco.  Alcohol: none  Caffeine: decaf tea sometimes  Medication Adherence/Access: no issues reported.    Hyperlipidemia /Elevated Lipoprotein A  Atorvastatin 10 mg daily - started 2/20/25  Patient reports weakness and fatigue, tried stopping the atorvastatin for ~2 days to see if this would improve, but patient  "reports she did not see any difference when she was not taking the atorvastatin    Patient follows with cardiology who state her LDL goal should be <60 mg/dL due to elevated lipoprotein A    Patient and  have many questions regarding dietary changes that should be made due to her new diagnosis, they do have a diabetes educator referral placed by cardiology provider. They have been following a very strict diet and trying to cut out many foods that are considered \"bad\"     Supplements   Nutrafol  Lutein + zeaxanthin  Orgain organic protein  Has not been taking since starting atorvastatin - patient is wondering if these will interact with any of her medications or conditions.      HSV   Acyclovir 5% ointment as needed  Valacyclovir 500 mg twice daily as needed  Patient reports stress causes more outbreaks, but nothing recently. No side effects reported when using medications.     Dandruff  Ketoconazole 2% shampoo twice weekly  Patient reports no concerns    Glaucoma  Latanoprost 1 drop in each eye at bedtime   Patient reports no concerns    Immunizations  Most Recent Immunizations   Administered Date(s) Administered    COVID-19 MONOVALENT 12+ (Pfizer) 12/13/2021    Pneumococcal 20 valent Conjugate (Prevnar 20) 09/11/2024    TDAP (Adacel,Boostrix) 07/11/2011    TDAP Vaccine (Adacel) 10/31/2014       Today's Vitals: /70   LMP  (LMP Unknown)   ----------------      I spent 60 minutes with this patient today. I offer these suggestions for consideration by Ana Laura Jones MD. A copy of the visit note was provided to the patient's provider(s).    A summary of these recommendations was given to the patient.    Mariah Jewell, MioD  Medication Therapy Management Pharmacy Resident  [facility]       Medication Therapy Recommendations  Need for vaccination   1 Rationale: Preventive therapy - Needs additional medication therapy - Indication   Recommendation: Order Vaccine   Status: Patient Agreed - " Adherence/Education   Identified Date: 3/31/2025 Completed Date: 3/31/2025   Note: COVID-19, RSV, flu, tetanus, Shingrix, HepA, HepB

## 2025-03-31 NOTE — PATIENT INSTRUCTIONS
"Recommendations from today's MTM visit:                                                    MTM (medication therapy management) is a service provided by a clinical pharmacist designed to help you get the most of out of your medicines.   Today we reviewed what your medicines are for, how to know if they are working, that your medicines are safe and how to make your medicine regimen as easy as possible.      Continue your current medications  Call [phone] for your diabetes education referral  Vaccines that you are due for:  COVID-19 booster, RSV, influenza  Tetanus booster, Shingles  Hepatitis A, Hepatitis B    Follow-up: 6 months, sooner if needed    It was great speaking with you today.  I value your experience and would be very thankful for your time in providing feedback in our clinic survey. In the next few days, you may receive an email or text message from [employer] with a link to a survey related to your  clinical pharmacist.\"     To schedule another MTM appointment, please call the clinic directly or you may call the MTM scheduling line at [phone] or toll-free at [phone].     My Clinical Pharmacist's contact information:                                                      Please feel free to contact me with any questions or concerns you have.      Mariah Jewell, PharmD  Medication Therapy Management Pharmacy Resident  [facility]  [phone]    Funmilayo Young PharmD, [facility]  Medication Therapy Management Provider  [facility]  [phone]    "

## 2025-03-31 NOTE — PROGRESS NOTES
I have verified the content of the note, which accurately reflects my assessment of the patient and the plan of care.   Kellen Gama, [facility], PharmD

## 2025-03-31 NOTE — PROGRESS NOTES
Assessment & Plan   Aortic calcification  Elevated lipoprotein A level  - symptoms still not well controlled  - Lipoprotein (a)  - continue cardiology clinic follow up    Hyperlipidemia LDL goal <100  - symptoms still not well controlled  - Lipid panel reflex to direct LDL Fasting  - atorvastatin (LIPITOR) 20 MG tablet  Dispense: 90 tablet; Refill: 1      Prediabetes  - symptoms still not well controlled  - Glucose  - Hemoglobin A1c    Pulmonary nodules  - recent incidental finding on CT scan  - Adult Oncology/Hematology  Referral              FUTURE APPOINTMENTS:       - Follow-up visit in 1 month    Juanpablo Virgen is a 64 year old, presenting for the following health issues:  Lipids        3/31/2025    12:45 PM   Additional Questions   Roomed by Katelyn         Current Outpatient Medications   Medication Sig Dispense Refill    acyclovir (ZOVIRAX) 5 % external ointment Apply topically 5 times daily. (Patient taking differently: Apply topically 5 times daily. PRN) 30 g 2    atorvastatin (LIPITOR) 20 MG tablet Take 1 tablet (20 mg) by mouth daily. 90 tablet 1    ketoconazole (NIZORAL) 2 % external shampoo APPLY SHAMPOO TO SCALP AND RINSE TWO TIMES A WEEK      latanoprost (XALATAN) 0.005 % ophthalmic solution INSTILL 1 DROP IN BOTH EYES EVERY NIGHT AT BEDTIME. SEPARATE BY AT LEAST 10 MINUTES FROM OTHER INTRAOCULAR PRESSURE-REDUCING OPHTHALMIC DRUGS      UNABLE TO FIND MEDICATION NAME: nutrafol      UNABLE TO FIND MEDICATION NAME: lutein and zeaxanthin      UNABLE TO FIND MEDICATION NAME: orgain organic protein      valACYclovir (VALTREX) 500 MG tablet Take 1 tablet (500 mg) by mouth 2 times daily. (Patient taking differently: Take 500 mg by mouth 2 times daily. PRN) 20 tablet 3     No current facility-administered medications for this visit.     Past Medical History:   Diagnosis Date    Abnormal echocardiogram     She had CT coronary angiography which was normal    GERD (gastroesophageal reflux disease)      Screen for colon cancer 09/2021    nl     Social History     Socioeconomic History    Marital status:      Spouse name: Not on file    Number of children: Not on file    Years of education: Not on file    Highest education level: Not on file   Occupational History    Not on file   Tobacco Use    Smoking status: Never    Smokeless tobacco: Never   Vaping Use    Vaping status: Never Used   Substance and Sexual Activity    Alcohol use: No     Alcohol/week: 0.0 standard drinks of alcohol    Drug use: No    Sexual activity: Yes     Partners: Male   Other Topics Concern    Parent/sibling w/ CABG, MI or angioplasty before 65F 55M? Not Asked   Social History Narrative    ** Merged History Encounter **          Social Drivers of Health     Financial Resource Strain: Low Risk  (9/11/2024)    Financial Resource Strain     Within the past 12 months, have you or your family members you live with been unable to get utilities (heat, electricity) when it was really needed?: No   Food Insecurity: Low Risk  (9/11/2024)    Food Insecurity     Within the past 12 months, did you worry that your food would run out before you got money to buy more?: No     Within the past 12 months, did the food you bought just not last and you didn t have money to get more?: No   Transportation Needs: Low Risk  (9/11/2024)    Transportation Needs     Within the past 12 months, has lack of transportation kept you from medical appointments, getting your medicines, non-medical meetings or appointments, work, or from getting things that you need?: No   Physical Activity: Unknown (9/11/2024)    Exercise Vital Sign     Days of Exercise per Week: 3 days     Minutes of Exercise per Session: Not on file   Stress: Stress Concern Present (9/11/2024)    [employer] [place] [employer] - Occupational Stress Questionnaire     Feeling of Stress : To some extent   Social Connections: Unknown (9/11/2024)    Social Connection and Isolation Panel  "[NHANES]     Frequency of Communication with Friends and Family: Not on file     Frequency of Social Gatherings with Friends and Family: Once a week     Attends Ep[employer] Services: Not on file     Active Member of Clubs or Organizations: Not on file     Attends Club or Organization Meetings: Not on file     Marital Status: Not on file   Interpersonal Safety: Low Risk  (2/20/2025)    Interpersonal Safety     Do you feel physically and emotionally safe where you currently live?: Yes     Within the past 12 months, have you been hit, slapped, kicked or otherwise physically hurt by someone?: No     Within the past 12 months, have you been humiliated or emotionally abused in other ways by your partner or ex-partner?: No   Housing Stability: Low Risk  (9/11/2024)    Housing Stability     Do you have housing? : Yes     Are you worried about losing your housing?: No               Review of Systems  Constitutional, HEENT, cardiovascular, pulmonary, GI, , musculoskeletal, neuro, skin, endocrine and psych systems are negative, except as otherwise noted.      Objective    /72 (BP Location: Left arm, Patient Position: Sitting, Cuff Size: Adult Regular)   Pulse 88   Temp 96.9  F (36.1  C) (Temporal)   Resp 16   Ht 1.549 m (5' 1\")   Wt 52.2 kg (115 lb)   LMP  (LMP Unknown)   SpO2 98%   BMI 21.73 kg/m    Body mass index is 21.73 kg/m .  Physical Exam   GENERAL: alert and no distress  EYES: Eyes grossly normal to inspection, conjunctivae and sclerae normal  HENT: normocephalic atraumatic  NECK: no asymmetry or scars  RESP: lungs clear to auscultation - no rales, rhonchi or wheezes  CV: regular rate and rhythm, normal S1 S2  ABDOMEN: soft, nondistended  MS: no gross musculoskeletal defects noted, no edema  SKIN: no suspicious lesions or rashes  NEURO: Normal strength and tone, mentation intact and speech normal  PSYCH: mentation appears normal, affect normal/bright    Labs reviewed in Epic    The longitudinal plan of " care for the diagnosis(es)/condition(s) as documented were addressed during this visit. Due to the added complexity in care, I will continue to support Promise in the subsequent management and with ongoing continuity of care.          Signed Electronically by: Roxanna Jones MD

## 2025-04-01 NOTE — TELEPHONE ENCOUNTER
Patient notified of provider's response via "[employer]"hart.     Annamaria AGUILAR RN  [facility] Triage Team      Roxanna Jones MD  [facility] - Primary Care3 hours ago (10:54 AM)     XH  OK with pt waiting to see Pulmonology? Yes

## 2025-04-01 NOTE — TELEPHONE ENCOUNTER
Dr. Jones - please review pt's MC and advise. Pt and spouse called for assistance, and Triage was able to do the following:    Medication - pt confirmed that she will take the 10mg atorvastatin until 5/7 OV.     Lab appt - Triage updated as pt's requested    3.   Lung Specialist Referral -- Per chart review, pt was contacted by Pulmonology on 3/31 --        OK with pt waiting to see Pulmonology, or would you prefer pt be seen ASAP at available OV? Thoughts on updating referral status to 'urgent'/'emergent'.

## 2025-04-02 NOTE — PROGRESS NOTES
I was asked by the NPS (new patient scheduling) team to reach out to patient as she is not wanting to wait three months for her next CT scan.  I called and left a detailed vm.  I explained that these lung nodules are often found incidentally on CT Cardiac scans.  Hers is small at 5 mm.  We like to get a CT Chest so we can capture the entire lung fields.  Since her nodule was on the small side we like to wait and check this CT in three months to see if there is any growth of that nodules, but if she prefers to have it declan sooner, that is fine also.  I have changed the instructions on the referral to indicate the above.    I asked Promise to call our NPS (new patient scheduling) team @ [phone] if she'd like to schedule.  I also gave her my direct contact information for any further questions/concerns.    Diana Davila, RN, BSN  Nurse Navigator  [phone]  [email or web address]

## 2025-04-07 ENCOUNTER — HOSPITAL ENCOUNTER (OUTPATIENT)
Dept: MAMMOGRAPHY | Facility: CLINIC | Age: 65
Discharge: HOME | End: 2025-04-07
Attending: INTERNAL MEDICINE | Admitting: INTERNAL MEDICINE
Payer: COMMERCIAL

## 2025-04-07 DIAGNOSIS — Z12.31 VISIT FOR SCREENING MAMMOGRAM: ICD-10-CM

## 2025-04-07 PROCEDURE — 77063 BREAST TOMOSYNTHESIS BI: CPT

## 2025-04-07 PROCEDURE — 77067 SCR MAMMO BI INCL CAD: CPT

## 2025-05-05 ENCOUNTER — LAB (OUTPATIENT)
Dept: LAB | Facility: CLINIC | Age: 65
End: 2025-05-05
Payer: COMMERCIAL

## 2025-05-05 DIAGNOSIS — E78.41 ELEVATED LIPOPROTEIN A LEVEL: ICD-10-CM

## 2025-05-05 DIAGNOSIS — E78.5 HYPERLIPIDEMIA LDL GOAL <100: ICD-10-CM

## 2025-05-05 DIAGNOSIS — R73.03 PREDIABETES: ICD-10-CM

## 2025-05-05 LAB
APO A-I SERPL-MCNC: 72 MG/DL
CHOLEST SERPL-MCNC: 150 MG/DL
EST. AVERAGE GLUCOSE BLD GHB EST-MCNC: 108 MG/DL
FASTING STATUS PATIENT QL REPORTED: YES
FASTING STATUS PATIENT QL REPORTED: YES
GLUCOSE SERPL-MCNC: 95 MG/DL (ref 70–99)
HBA1C MFR BLD: 5.4 % (ref 0–5.6)
HDLC SERPL-MCNC: 66 MG/DL
LDLC SERPL CALC-MCNC: 72 MG/DL
NONHDLC SERPL-MCNC: 84 MG/DL
TRIGL SERPL-MCNC: 59 MG/DL

## 2025-05-05 PROCEDURE — 83695 ASSAY OF LIPOPROTEIN(A): CPT

## 2025-05-05 PROCEDURE — 80061 LIPID PANEL: CPT

## 2025-05-05 PROCEDURE — 83036 HEMOGLOBIN GLYCOSYLATED A1C: CPT

## 2025-05-05 PROCEDURE — 82947 ASSAY GLUCOSE BLOOD QUANT: CPT

## 2025-05-05 PROCEDURE — 36415 COLL VENOUS BLD VENIPUNCTURE: CPT

## 2025-05-07 ENCOUNTER — OFFICE VISIT (OUTPATIENT)
Dept: FAMILY MEDICINE | Facility: CLINIC | Age: 65
End: 2025-05-07
Payer: COMMERCIAL

## 2025-05-07 VITALS
TEMPERATURE: 98.2 F | DIASTOLIC BLOOD PRESSURE: 78 MMHG | OXYGEN SATURATION: 95 % | HEART RATE: 75 BPM | BODY MASS INDEX: 21.14 KG/M2 | HEIGHT: 61 IN | SYSTOLIC BLOOD PRESSURE: 117 MMHG | RESPIRATION RATE: 16 BRPM | WEIGHT: 112 LBS

## 2025-05-07 DIAGNOSIS — E78.5 HYPERLIPIDEMIA LDL GOAL <100: ICD-10-CM

## 2025-05-07 DIAGNOSIS — R73.03 PREDIABETES: ICD-10-CM

## 2025-05-07 DIAGNOSIS — E78.41 ELEVATED LIPOPROTEIN A LEVEL: Primary | ICD-10-CM

## 2025-05-07 PROCEDURE — 1126F AMNT PAIN NOTED NONE PRSNT: CPT | Performed by: INTERNAL MEDICINE

## 2025-05-07 PROCEDURE — G2211 COMPLEX E/M VISIT ADD ON: HCPCS | Performed by: INTERNAL MEDICINE

## 2025-05-07 PROCEDURE — 3074F SYST BP LT 130 MM HG: CPT | Performed by: INTERNAL MEDICINE

## 2025-05-07 PROCEDURE — 3078F DIAST BP <80 MM HG: CPT | Performed by: INTERNAL MEDICINE

## 2025-05-07 PROCEDURE — 99214 OFFICE O/P EST MOD 30 MIN: CPT | Performed by: INTERNAL MEDICINE

## 2025-05-07 RX ORDER — ATORVASTATIN CALCIUM 10 MG/1
10 TABLET, FILM COATED ORAL DAILY
Qty: 90 TABLET | Refills: 1 | Status: SHIPPED | OUTPATIENT
Start: 2025-05-07

## 2025-05-07 ASSESSMENT — PAIN SCALES - GENERAL: PAINLEVEL_OUTOF10: NO PAIN (0)

## 2025-05-07 NOTE — PROGRESS NOTES
Assessment & Plan     Hyperlipidemia LDL goal <100  - improved from prior baseline  - medication refilled for atorvastatin (LIPITOR) 10 MG tablet  Dispense: 90 tablet; Refill: 1  - recheck Lipid panel reflex to direct LDL, Fasting Lipoprotein (a),  LDL cholesterol direct labs in 3 months    Elevated lipoprotein A level  - improved from prior baseline  - continue Lipitor 10 mg    Prediabetes  - improved from prior baseline  - **Hemoglobin A1c FUTURE 3mo  - Glucose              Follow-up in 3 months      Juanpablo Virgen is a 65 year old, presenting for the following health issues:  RECHECK    History of Present Illness       Diabetes:   She presents for follow up of diabetes.    She is not checking blood glucose.        She is concerned about other.   She is having blurry vision.            Hyperlipidemia:  She presents for follow up of hyperlipidemia.   She is taking medication to lower cholesterol. She is not having myalgia or other side effects to statin medications.    Reason for visit:  Follow up    She eats 2-3 servings of fruits and vegetables daily.She consumes 0 sweetened beverage(s) daily.She exercises with enough effort to increase her heart rate 30 to 60 minutes per day.  She exercises with enough effort to increase her heart rate 4 days per week.   She is taking medications regularly.        Current Outpatient Medications   Medication Sig Dispense Refill    acyclovir (ZOVIRAX) 5 % external ointment Apply topically 5 times daily. (Patient taking differently: Apply topically 5 times daily. PRN) 30 g 2    atorvastatin (LIPITOR) 10 MG tablet Take 1 tablet (10 mg) by mouth daily. 90 tablet 1    ketoconazole (NIZORAL) 2 % external shampoo APPLY SHAMPOO TO SCALP AND RINSE TWO TIMES A WEEK      latanoprost (XALATAN) 0.005 % ophthalmic solution INSTILL 1 DROP IN BOTH EYES EVERY NIGHT AT BEDTIME. SEPARATE BY AT LEAST 10 MINUTES FROM OTHER INTRAOCULAR PRESSURE-REDUCING OPHTHALMIC DRUGS      UNABLE TO FIND  MEDICATION NAME: nutrafol      UNABLE TO FIND MEDICATION NAME: lutein and zeaxanthin      UNABLE TO FIND MEDICATION NAME: orgain organic protein      valACYclovir (VALTREX) 500 MG tablet Take 1 tablet (500 mg) by mouth 2 times daily. (Patient taking differently: Take 500 mg by mouth 2 times daily. PRN) 20 tablet 3     No current facility-administered medications for this visit.     Past Medical History:   Diagnosis Date    Abnormal echocardiogram     She had CT coronary angiography which was normal    GERD (gastroesophageal reflux disease)     Screen for colon cancer 09/2021    nl     Social History     Socioeconomic History    Marital status:      Spouse name: Not on file    Number of children: Not on file    Years of education: Not on file    Highest education level: Not on file   Occupational History    Not on file   Tobacco Use    Smoking status: Never    Smokeless tobacco: Never   Vaping Use    Vaping status: Never Used   Substance and Sexual Activity    Alcohol use: No     Alcohol/week: 0.0 standard drinks of alcohol    Drug use: No    Sexual activity: Yes     Partners: Male   Other Topics Concern    Parent/sibling w/ CABG, MI or angioplasty before 65F 55M? Not Asked   Social History Narrative    ** Merged History Encounter **          Social Drivers of Health     Financial Resource Strain: Low Risk  (9/11/2024)    Financial Resource Strain     Within the past 12 months, have you or your family members you live with been unable to get utilities (heat, electricity) when it was really needed?: No   Food Insecurity: Low Risk  (9/11/2024)    Food Insecurity     Within the past 12 months, did you worry that your food would run out before you got money to buy more?: No     Within the past 12 months, did the food you bought just not last and you didn t have money to get more?: No   Transportation Needs: Low Risk  (9/11/2024)    Transportation Needs     Within the past 12 months, has lack of transportation kept  "you from medical appointments, getting your medicines, non-medical meetings or appointments, work, or from getting things that you need?: No   Physical Activity: Unknown (9/11/2024)    Exercise Vital Sign     Days of Exercise per Week: 3 days     Minutes of Exercise per Session: Not on file   Stress: Stress Concern Present (9/11/2024)    [employer] [place] [employer] - Occupational Stress Questionnaire     Feeling of Stress : To some extent   Social Connections: Unknown (9/11/2024)    Social Connection and Isolation Panel [NHANES]     Frequency of Communication with Friends and Family: Not on file     Frequency of Social Gatherings with Friends and Family: Once a week     Attends Bahai Services: Not on file     Active Member of Clubs or Organizations: Not on file     Attends Club or Organization Meetings: Not on file     Marital Status: Not on file   Interpersonal Safety: Low Risk  (2/20/2025)    Interpersonal Safety     Do you feel physically and emotionally safe where you currently live?: Yes     Within the past 12 months, have you been hit, slapped, kicked or otherwise physically hurt by someone?: No     Within the past 12 months, have you been humiliated or emotionally abused in other ways by your partner or ex-partner?: No   Housing Stability: Low Risk  (9/11/2024)    Housing Stability     Do you have housing? : Yes     Are you worried about losing your housing?: No           Review of Systems  Constitutional, HEENT, cardiovascular, pulmonary, GI, , musculoskeletal, neuro, skin, endocrine and psych systems are negative, except as otherwise noted.      Objective    /78 (BP Location: Right arm, Patient Position: Sitting, Cuff Size: Adult Regular)   Pulse 75   Temp 98.2  F (36.8  C) (Temporal)   Resp 16   Ht 1.549 m (5' 1\")   Wt 50.8 kg (112 lb)   LMP  (LMP Unknown)   SpO2 95%   BMI 21.16 kg/m    Body mass index is 21.16 kg/m .  Physical Exam   GENERAL: alert and no distress  EYES: " Eyes grossly normal to inspection, conjunctivae and sclerae normal  HENT: normocephalic atraumatic, nose and mouth without ulcers or lesions  NECK: no asymmetry, masses, or scars  RESP: lungs clear to auscultation - no rales, rhonchi or wheezes  CV: regular rate and rhythm, normal S1 S2  ABDOMEN: soft, nondistended  MS: no gross musculoskeletal defects noted, no edema  SKIN: no suspicious lesions or rashes  NEURO: Normal strength and tone, mentation intact and speech normal  PSYCH: mentation appears normal, affect normal/bright    Labs reviewed in Epic    The longitudinal plan of care for the diagnosis(es)/condition(s) as documented were addressed during this visit. Due to the added complexity in care, I will continue to support Promise in the subsequent management and with ongoing continuity of care.          Signed Electronically by: Roxanna Jones MD

## 2025-05-21 ENCOUNTER — TELEPHONE (OUTPATIENT)
Dept: OTOLARYNGOLOGY | Facility: CLINIC | Age: 65
End: 2025-05-21
Payer: COMMERCIAL

## 2025-05-21 NOTE — TELEPHONE ENCOUNTER
LVM that patient was scheduled incorrectly with Leah SAWANT They were referred for a video swallow study. Gave [facility] re[facility] number to reschedule

## 2025-06-06 ENCOUNTER — ANCILLARY PROCEDURE (OUTPATIENT)
Dept: GENERAL RADIOLOGY | Facility: CLINIC | Age: 65
End: 2025-06-06
Attending: PHYSICIAN ASSISTANT
Payer: COMMERCIAL

## 2025-06-06 ENCOUNTER — OFFICE VISIT (OUTPATIENT)
Dept: URGENT CARE | Facility: URGENT CARE | Age: 65
End: 2025-06-06
Payer: COMMERCIAL

## 2025-06-06 VITALS
OXYGEN SATURATION: 100 % | DIASTOLIC BLOOD PRESSURE: 55 MMHG | HEART RATE: 67 BPM | TEMPERATURE: 98.1 F | SYSTOLIC BLOOD PRESSURE: 107 MMHG | WEIGHT: 110.1 LBS | HEIGHT: 61 IN | RESPIRATION RATE: 18 BRPM | BODY MASS INDEX: 20.79 KG/M2

## 2025-06-06 DIAGNOSIS — S99.922A FOOT INJURY, LEFT, INITIAL ENCOUNTER: ICD-10-CM

## 2025-06-06 DIAGNOSIS — S29.9XXA TRAUMATIC INJURY OF RIB: ICD-10-CM

## 2025-06-06 DIAGNOSIS — S89.91XA KNEE INJURY, RIGHT, INITIAL ENCOUNTER: ICD-10-CM
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
## 2025-06-06 DIAGNOSIS — M25.511 ACUTE PAIN OF RIGHT SHOULDER: ICD-10-CM

## 2025-06-06 PROCEDURE — 73630 X-RAY EXAM OF FOOT: CPT | Mod: TC | Performed by: RADIOLOGY

## 2025-06-06 PROCEDURE — 73030 X-RAY EXAM OF SHOULDER: CPT | Mod: TC | Performed by: RADIOLOGY

## 2025-06-06 PROCEDURE — 3078F DIAST BP <80 MM HG: CPT | Performed by: PHYSICIAN ASSISTANT

## 2025-06-06 PROCEDURE — 71101 X-RAY EXAM UNILAT RIBS/CHEST: CPT | Mod: TC | Performed by: RADIOLOGY

## 2025-06-06 PROCEDURE — 73562 X-RAY EXAM OF KNEE 3: CPT | Mod: TC | Performed by: RADIOLOGY

## 2025-06-06 PROCEDURE — 99214 OFFICE O/P EST MOD 30 MIN: CPT | Performed by: PHYSICIAN ASSISTANT

## 2025-06-06 PROCEDURE — 3074F SYST BP LT 130 MM HG: CPT | Performed by: PHYSICIAN ASSISTANT

## 2025-06-06 RX ORDER — NAPROXEN 500 MG/1
500 TABLET ORAL 2 TIMES DAILY WITH MEALS
Qty: 30 TABLET | Refills: 0 | Status: SHIPPED | OUTPATIENT
Start: 2025-06-06

## 2025-06-06 RX ORDER — ACETAMINOPHEN 500 MG
500-1000 TABLET ORAL EVERY 6 HOURS PRN
Qty: 30 TABLET | Refills: 0 | Status: SHIPPED | OUTPATIENT
Start: 2025-06-06

## 2025-06-07 NOTE — PROGRESS NOTES
"Assessment & Plan     Fall, initial encounter    Patient tripped and fell today  Injured her left foot, right shoulder, right side ribs and right knee    Foot injury, left, initial encounter    Foot xray Negative for acute findings, read by Bertram ARCHER at time of visit.  RICE treatment: Rest, Ice, compression, elevation   Symptomatic treatment  Medications   Activity as tolerated    - XR Foot Left G/E 3 Views  - naproxen (NAPROSYN) 500 MG tablet  Dispense: 30 tablet; Refill: 0  - acetaminophen (TYLENOL) 500 MG tablet  Dispense: 30 tablet; Refill: 0    Acute pain of right shoulder    Xray shoulder Negative for acute findings, read by Bertram ARHCER at time of visit.  Ice compresses  ROM exercises  Medication  Activity as tolerated    - XR Shoulder Right G/E 3 Views  - naproxen (NAPROSYN) 500 MG tablet  Dispense: 30 tablet; Refill: 0  - acetaminophen (TYLENOL) 500 MG tablet  Dispense: 30 tablet; Refill: 0    Traumatic injury of rib    Xray rib Negative for acute findings, read by Bertram ARCHER at time of visit.    You can get a bruised rib if you fall or get hit, such as in an accident or while playing sports. The medical term for a bruise is \"contusion.\" Small blood vessels get torn and leak blood under the skin.  Most people think of a bruise as a black-and-blue area. But bones and muscles can also get bruised. An injury may damage the rib but not cause a bruise that you can see.  Sometimes it can be hard to tell if a rib is bruised or broken. The symptoms may be the same. And a broken bone can't always be seen on an X-ray. But the treatment for a bruised rib is often the same as treatment for a broken one.  An injury to the ribs can cause pain. The pain may be worse when you breathe deeply, cough, or sneeze.  In most cases, a bruised rib will heal on its own. You can take pain medicine while the rib mends. Pain relief allows you to take deep breaths.    - XR Ribs & Chest Right G/E 3 " Views  - naproxen (NAPROSYN) 500 MG tablet  Dispense: 30 tablet; Refill: 0  - acetaminophen (TYLENOL) 500 MG tablet  Dispense: 30 tablet; Refill: 0    Knee injury, right, initial encounter    Knee xray Negative for acute findings, read by Bertram ARCHER at time of visit.    Injuries are a common cause of knee problems. Sudden (acute) injuries may be caused by a direct blow to the knee. They can also be caused by abnormal twisting, bending, or falling on the knee. Pain, bruising, or swelling may be severe, and may start within minutes of the injury.  Overuse is another cause of knee pain. Other causes are climbing stairs, kneeling, and other activities that use the knee. Everyday wear and tear, especially as you get older, also can cause knee pain.  Rest, along with home treatment, often relieves pain and allows your knee to heal. If you have a serious knee injury, you may need tests and treatment.    Rest  Ice compresses  Medications  Activity as tolerated    - XR Knee Right 3 Views  - naproxen (NAPROSYN) 500 MG tablet  Dispense: 30 tablet; Refill: 0  - acetaminophen (TYLENOL) 500 MG tablet  Dispense: 30 tablet; Refill: 0         At today's visit with Promise Nunez , we discussed results, diagnosis, medications and formulated a plan.  We also discussed red flags for immediate return to clinic/ER, as well as indications for follow up with PCP if not improved in 3 days. Patient understood and agreed to plan. Promise Nunez was discharged with stable vitals and has no further questions.       No follow-ups on file.    Bertram Corrales, SUZI, EMILY  [facility] YAYO Virgen is a 65 year old female who presents to clinic today for the following health issues:  Chief Complaint   Patient presents with    Urgent Care    Fall     Patient reports falling down this morning and now has right knee pain, right side and right shoulder pain. Patient reports when she moves she has right side  "pain. Patient reports falling on side walk.         6/6/2025     7:04 PM   Additional Questions   Roomed by Mellisa Lynn CMA   Accompanied by Rafa-     HPI    Review of Systems  Constitutional, HEENT, cardiovascular, pulmonary, gi and gu systems are negative, except as otherwise noted.      Objective    /55 (BP Location: Left arm, Patient Position: Sitting, Cuff Size: Adult Regular)   Pulse 67   Temp 98.1  F (36.7  C) (Tympanic)   Resp 18   Ht 1.549 m (5' 1\")   Wt 49.9 kg (110 lb 1.6 oz)   LMP  (LMP Unknown)   SpO2 100%   BMI 20.80 kg/m    Physical Exam   GENERAL: alert and no distress  EYES: Eyes grossly normal to inspection, PERRL and conjunctivae and sclerae normal  HENT: ear canals and TM's normal, nose and mouth without ulcers or lesions  NECK: no adenopathy, no asymmetry, masses, or scars  RESP: lungs clear to auscultation - no rales, rhonchi or wheezes  CV: regular rate and rhythm, normal S1 S2, no S3 or S4, no murmur, click or rub, no peripheral edema  ABDOMEN: soft, nontender, no hepatosplenomegaly, no masses and bowel sounds normal  MS: Pos for tenderness right side ribs with palpation  KNEE: Pos for right anterior knee tenderness, slight bruising  FOOT: pos for tenderness left lateral foot  SKIN: no suspicious lesions or rashes  NEURO: Normal strength and tone, mentation intact and speech normal  PSYCH: mentation appears normal, affect normal/bright  SHOULDER: FUll ROM  tenderness lateral shoulder          "

## 2025-06-07 NOTE — PROGRESS NOTES
Urgent Care Clinic Visit    Chief Complaint   Patient presents with    Urgent Care    Fall     Patient reports falling down this morning and now has right knee pain, right side and right shoulder pain. Patient reports when she moves she has right side pain. Patient reports falling on side walk.               6/6/2025     7:04 PM   Additional Questions   Roomed by Mellisa Lynn CMA   Accompanied by Rafa-monserrat Lynn MA on 6/6/2025 at 7:05 PM

## 2025-07-09 ENCOUNTER — ONCOLOGY VISIT (OUTPATIENT)
Dept: PULMONOLOGY | Facility: CLINIC | Age: 65
End: 2025-07-09
Attending: INTERNAL MEDICINE
Payer: COMMERCIAL

## 2025-07-09 ENCOUNTER — ANCILLARY PROCEDURE (OUTPATIENT)
Dept: CT IMAGING | Facility: CLINIC | Age: 65
End: 2025-07-09
Attending: INTERNAL MEDICINE
Payer: COMMERCIAL

## 2025-07-09 VITALS
HEIGHT: 61 IN | DIASTOLIC BLOOD PRESSURE: 70 MMHG | RESPIRATION RATE: 16 BRPM | WEIGHT: 110.3 LBS | SYSTOLIC BLOOD PRESSURE: 109 MMHG | BODY MASS INDEX: 20.82 KG/M2 | HEART RATE: 71 BPM | OXYGEN SATURATION: 99 %

## 2025-07-09 DIAGNOSIS — R91.8 PULMONARY NODULES: ICD-10-CM

## 2025-07-09 PROCEDURE — 71250 CT THORAX DX C-: CPT

## 2025-07-09 PROCEDURE — 99204 OFFICE O/P NEW MOD 45 MIN: CPT | Performed by: INTERNAL MEDICINE

## 2025-07-09 RX ORDER — BRIMONIDINE TARTRATE 2 MG/ML
1 SOLUTION/ DROPS OPHTHALMIC 2 TIMES DAILY
COMMUNITY
Start: 2025-06-24

## 2025-07-09 ASSESSMENT — PAIN SCALES - GENERAL: PAINLEVEL_OUTOF10: NO PAIN (0)

## 2025-07-09 NOTE — PROGRESS NOTES
[facility] [address] [address]  Phone: [phone]  Fax: [phone]    [facility] Initial Visit    Patient:  Promise Nunez, Date of birth 1960  Date of Visit:  07/09/2025  Referring Provider Roxanna Jones      Assessment & Plan      Promise is a 64 yo female with incidental lung nodule, seen today with her . The lung nodule was detected on a coronary calcium scan.  She had an influenza earlier this year when this all started  CT chest today stable from March 5 mm LLL nodule.     We will repeat a CT in about a year.     Medical Decision Making             Maureen Prabhakar MD             Reason for Visit  Promise Nunez is a 65 year old female who is referred by Dr Jones for lung nodule  Pulmonary HPI    - No new respiratory symptoms or complaints.    - she had influenza earlier this year which found some imaging abnormalities      Other active medical problems include:   - calcification on aorta and elevated lipoprotein a being managed with lifestyle   ROS Pulmonary  Dyspnea: No, Cough: No, Chest pain: No, Wheezing: No, Sputum Production: No, Hemoptysis: No  A complete ROS was otherwise negative except as noted in the HPI.    The patient was seen and examined by Maureen Prabhakar MD   Current Outpatient Medications   Medication Sig Dispense Refill    acetaminophen (TYLENOL) 500 MG tablet Take 1-2 tablets (500-1,000 mg) by mouth every 6 hours as needed for mild pain. 30 tablet 0    acyclovir (ZOVIRAX) 5 % external ointment Apply topically 5 times daily. (Patient taking differently: Apply topically as needed.) 30 g 2    atorvastatin (LIPITOR) 10 MG tablet Take 1 tablet (10 mg) by mouth daily. 90 tablet 1    brimonidine (ALPHAGAN) 0.2 % ophthalmic solution Place 1 drop into both eyes 2 times daily.      latanoprost (XALATAN) 0.005 % ophthalmic solution INSTILL 1 DROP IN BOTH EYES EVERY NIGHT  AT BEDTIME. SEPARATE BY AT LEAST 10 MINUTES FROM OTHER INTRAOCULAR PRESSURE-REDUCING OPHTHALMIC DRUGS      UNABLE TO FIND MEDICATION NAME: nutrafol      UNABLE TO FIND MEDICATION NAME: lutein and zeaxanthin      UNABLE TO FIND MEDICATION NAME: orgain organic protein      valACYclovir (VALTREX) 500 MG tablet Take 1 tablet (500 mg) by mouth 2 times daily. 20 tablet 3    ketoconazole (NIZORAL) 2 % external shampoo APPLY SHAMPOO TO SCALP AND RINSE TWO TIMES A WEEK (Patient not taking: Reported on 7/9/2025)      naproxen (NAPROSYN) 500 MG tablet Take 1 tablet (500 mg) by mouth 2 times daily (with meals). 30 tablet 0     No current facility-administered medications for this visit.     No Known Allergies  Social History     Socioeconomic History    Marital status:      Spouse name: Not on file    Number of children: Not on file    Years of education: Not on file    Highest education level: Not on file   Occupational History    Not on file   Tobacco Use    Smoking status: Never     Passive exposure: Never    Smokeless tobacco: Never   Vaping Use    Vaping status: Never Used   Substance and Sexual Activity    Alcohol use: Not Currently    Drug use: No    Sexual activity: Yes     Partners: Male   Other Topics Concern    Parent/sibling w/ CABG, MI or angioplasty before 65F 55M? Not Asked   Social History Narrative    ** Merged History Encounter **          Social Drivers of Health     Financial Resource Strain: Low Risk  (9/11/2024)    Financial Resource Strain     Within the past 12 months, have you or your family members you live with been unable to get utilities (heat, electricity) when it was really needed?: No   Food Insecurity: Low Risk  (9/11/2024)    Food Insecurity     Within the past 12 months, did you worry that your food would run out before you got money to buy more?: No     Within the past 12 months, did the food you bought just not last and you didn t have money to get more?: No   Transportation Needs:  Low Risk  (9/11/2024)    Transportation Needs     Within the past 12 months, has lack of transportation kept you from medical appointments, getting your medicines, non-medical meetings or appointments, work, or from getting things that you need?: No   Physical Activity: Unknown (9/11/2024)    Exercise Vital Sign     Days of Exercise per Week: 3 days     Minutes of Exercise per Session: Not on file   Stress: Stress Concern Present (9/11/2024)    [employer] - Occupational Stress Questionnaire     Feeling of Stress : To some extent   Social Connections: Unknown (9/11/2024)    Social Connection and Isolation Panel [NHANES]     Frequency of Communication with Friends and Family: Not on file     Frequency of Social Gatherings with Friends and Family: Once a week     Attends [employer] Services: Not on file     Active Member of Clubs or Organizations: Not on file     Attends Club or Organization Meetings: Not on file     Marital Status: Not on file   Interpersonal Safety: Low Risk  (2/20/2025)    Interpersonal Safety     Do you feel physically and emotionally safe where you currently live?: Yes     Within the past 12 months, have you been hit, slapped, kicked or otherwise physically hurt by someone?: No     Within the past 12 months, have you been humiliated or emotionally abused in other ways by your partner or ex-partner?: No   Housing Stability: Low Risk  (9/11/2024)    Housing Stability     Do you have housing? : Yes     Are you worried about losing your housing?: No     Past Medical History:   Diagnosis Date    Abnormal echocardiogram     She had CT coronary angiography which was normal    GERD (gastroesophageal reflux disease)     Screen for colon cancer 09/2021    nl     Past Surgical History:   Procedure Laterality Date    COLONOSCOPY N/A 9/22/2021    Procedure: COLONOSCOPY;  Surgeon: Edwina Smith MD;  Location:  GI    None       Family History   Problem Relation Age of Onset  "   Hypertension Mother     Diabetes Mother     Diabetes Father     Pulmonary Hypertension Sister     Colon Cancer Brother     Lung Cancer No family hx of        Exam:   /70   Pulse 71   Resp 16   Ht 1.549 m (5' 1\")   Wt 50 kg (110 lb 4.8 oz)   LMP  (LMP Unknown)   SpO2 99%   BMI 20.84 kg/m    GENERAL APPEARANCE: Well developed, well nourished, alert, and in no apparent distress.  PSYCH: mentation appears normal. and affect normal/bright  Results:  - My interpretation of the images relevant for this visit includes: CT chest today images reviewed and compared to prior in march. Stable 5 mm LLL nodule   - My interpretation of the PFT's relevant for this visit includes: None         "

## 2025-07-09 NOTE — PATIENT INSTRUCTIONS
There is a tiny lung nodule which is probably nothing to worry about but we can keep an eye on with another CT in about a year.

## 2025-07-09 NOTE — NURSING NOTE
"Chief Complaint   Patient presents with    Consult     Pulmonary nodules       Vitals:    07/09/25 1046   BP: 109/70   Pulse: 71   Resp: 16   SpO2: 99%   Weight: 50 kg (110 lb 4.8 oz)   Height: 1.549 m (5' 1\")       Body mass index is 20.84 kg/m .    Jagdish Del Rosario CMA    "

## 2025-08-12 ENCOUNTER — PATIENT OUTREACH (OUTPATIENT)
Dept: CARE COORDINATION | Facility: CLINIC | Age: 65
End: 2025-08-12
Payer: COMMERCIAL

## 2025-08-25 ENCOUNTER — LAB (OUTPATIENT)
Dept: LAB | Facility: CLINIC | Age: 65
End: 2025-08-25
Payer: COMMERCIAL

## 2025-08-25 DIAGNOSIS — R73.03 PREDIABETES: ICD-10-CM

## 2025-08-25 DIAGNOSIS — E78.5 HYPERLIPIDEMIA LDL GOAL <100: ICD-10-CM

## 2025-08-25 LAB
APO A-I SERPL-MCNC: 90 MG/DL
CHOLEST SERPL-MCNC: 156 MG/DL
EST. AVERAGE GLUCOSE BLD GHB EST-MCNC: 120 MG/DL
FASTING STATUS PATIENT QL REPORTED: YES
FASTING STATUS PATIENT QL REPORTED: YES
GLUCOSE SERPL-MCNC: 90 MG/DL (ref 70–99)
HBA1C MFR BLD: 5.8 % (ref 0–5.6)
HDLC SERPL-MCNC: 68 MG/DL
LDLC SERPL CALC-MCNC: 74 MG/DL
LDLC SERPL DIRECT ASSAY-MCNC: 80 MG/DL
NONHDLC SERPL-MCNC: 88 MG/DL
TRIGL SERPL-MCNC: 69 MG/DL

## 2025-08-25 PROCEDURE — 80061 LIPID PANEL: CPT

## 2025-08-25 PROCEDURE — 82947 ASSAY GLUCOSE BLOOD QUANT: CPT

## 2025-08-25 PROCEDURE — 36415 COLL VENOUS BLD VENIPUNCTURE: CPT

## 2025-08-25 PROCEDURE — 83036 HEMOGLOBIN GLYCOSYLATED A1C: CPT

## 2025-08-25 PROCEDURE — 83695 ASSAY OF LIPOPROTEIN(A): CPT

## 2025-08-25 PROCEDURE — 83721 ASSAY OF BLOOD LIPOPROTEIN: CPT | Mod: 59

## 2025-08-26 ENCOUNTER — PATIENT OUTREACH (OUTPATIENT)
Dept: CARE COORDINATION | Facility: CLINIC | Age: 65
End: 2025-08-26
Payer: COMMERCIAL

## 2025-08-27 ENCOUNTER — OFFICE VISIT (OUTPATIENT)
Dept: FAMILY MEDICINE | Facility: CLINIC | Age: 65
End: 2025-08-27
Payer: COMMERCIAL

## 2025-08-27 VITALS
TEMPERATURE: 98.6 F | HEART RATE: 69 BPM | BODY MASS INDEX: 20.82 KG/M2 | RESPIRATION RATE: 16 BRPM | WEIGHT: 110.3 LBS | OXYGEN SATURATION: 100 % | DIASTOLIC BLOOD PRESSURE: 67 MMHG | SYSTOLIC BLOOD PRESSURE: 106 MMHG | HEIGHT: 61 IN

## 2025-08-27 DIAGNOSIS — E78.5 HYPERLIPIDEMIA LDL GOAL <100: Primary | ICD-10-CM

## 2025-08-27 DIAGNOSIS — M81.0 AGE-RELATED OSTEOPOROSIS WITHOUT CURRENT PATHOLOGICAL FRACTURE: ICD-10-CM

## 2025-08-27 DIAGNOSIS — E78.41 ELEVATED LIPOPROTEIN A LEVEL: ICD-10-CM

## 2025-08-27 DIAGNOSIS — Z78.0 ASYMPTOMATIC POSTMENOPAUSAL STATE: ICD-10-CM

## 2025-08-27 DIAGNOSIS — F41.1 GAD (GENERALIZED ANXIETY DISORDER): ICD-10-CM

## 2025-08-27 DIAGNOSIS — R73.03 PREDIABETES: ICD-10-CM

## 2025-08-27 PROCEDURE — 99214 OFFICE O/P EST MOD 30 MIN: CPT | Performed by: INTERNAL MEDICINE

## 2025-08-27 PROCEDURE — 1126F AMNT PAIN NOTED NONE PRSNT: CPT | Performed by: INTERNAL MEDICINE

## 2025-08-27 PROCEDURE — 3074F SYST BP LT 130 MM HG: CPT | Performed by: INTERNAL MEDICINE

## 2025-08-27 PROCEDURE — G2211 COMPLEX E/M VISIT ADD ON: HCPCS | Performed by: INTERNAL MEDICINE

## 2025-08-27 PROCEDURE — 3078F DIAST BP <80 MM HG: CPT | Performed by: INTERNAL MEDICINE

## 2025-08-27 RX ORDER — ESCITALOPRAM OXALATE 5 MG/1
5 TABLET ORAL DAILY
Qty: 90 TABLET | Refills: 3 | Status: SHIPPED | OUTPATIENT
Start: 2025-08-27

## 2025-08-27 RX ORDER — LANOLIN ALCOHOL/MO/W.PET/CERES
250 CREAM (GRAM) TOPICAL AT BEDTIME
Qty: 90 CAPSULE | Refills: 3 | Status: SHIPPED | OUTPATIENT
Start: 2025-08-27

## 2025-08-27 ASSESSMENT — PAIN SCALES - GENERAL: PAINLEVEL_OUTOF10: NO PAIN (0)

## 2025-09-04 ENCOUNTER — ANCILLARY PROCEDURE (OUTPATIENT)
Dept: BONE DENSITY | Facility: CLINIC | Age: 65
End: 2025-09-04
Attending: INTERNAL MEDICINE
Payer: COMMERCIAL

## 2025-09-04 DIAGNOSIS — M81.0 AGE-RELATED OSTEOPOROSIS WITHOUT CURRENT PATHOLOGICAL FRACTURE: ICD-10-CM

## (undated) RX ORDER — FENTANYL CITRATE 50 UG/ML
INJECTION, SOLUTION INTRAMUSCULAR; INTRAVENOUS
Status: DISPENSED
Start: 2021-09-22